# Patient Record
Sex: FEMALE | Race: WHITE | Employment: OTHER | URBAN - METROPOLITAN AREA
[De-identification: names, ages, dates, MRNs, and addresses within clinical notes are randomized per-mention and may not be internally consistent; named-entity substitution may affect disease eponyms.]

---

## 2017-01-26 ENCOUNTER — OFFICE VISIT (OUTPATIENT)
Dept: ENDOCRINOLOGY | Age: 72
End: 2017-01-26

## 2017-01-26 ENCOUNTER — HOSPITAL ENCOUNTER (OUTPATIENT)
Dept: LAB | Age: 72
Discharge: HOME | End: 2017-01-26
Payer: MEDICARE

## 2017-01-26 VITALS
DIASTOLIC BLOOD PRESSURE: 65 MMHG | HEIGHT: 66 IN | BODY MASS INDEX: 39.05 KG/M2 | WEIGHT: 243 LBS | HEART RATE: 82 BPM | TEMPERATURE: 97.4 F | RESPIRATION RATE: 20 BRPM | OXYGEN SATURATION: 97 % | SYSTOLIC BLOOD PRESSURE: 110 MMHG

## 2017-01-26 DIAGNOSIS — L68.0 HIRSUTISM: ICD-10-CM

## 2017-01-26 DIAGNOSIS — L65.9 HAIR LOSS: Primary | ICD-10-CM

## 2017-01-26 DIAGNOSIS — E07.9 THYROID DISORDER: ICD-10-CM

## 2017-01-26 PROCEDURE — 83001 ASSAY OF GONADOTROPIN (FSH): CPT

## 2017-01-26 PROCEDURE — 83002 ASSAY OF GONADOTROPIN (LH): CPT

## 2017-01-26 PROCEDURE — 84403 ASSAY OF TOTAL TESTOSTERONE: CPT

## 2017-01-26 PROCEDURE — 36415 COLL VENOUS BLD VENIPUNCTURE: CPT

## 2017-01-26 PROCEDURE — 84443 ASSAY THYROID STIM HORMONE: CPT

## 2017-01-26 NOTE — PROGRESS NOTES
HISTORY OF PRESENT ILLNESS  Anthony Mix is a 80 y.o. female. HPI  Patient here for  F/u after last  visit of Type 2 diabetes mellitus  And osteoporosis  From Dec  2016       She is losing hair on scalp   She has hot flashes , sweating     She wonders if the metformin er is the cause   She has not changed her diet at all   She experienced constipation instead of diarrhea on metformin er         . Referred : by self    H/o diabetes for 1 year     Current A1C is 7.1 % and symptoms/problems include none     Current diabetic medications include metformin. She never liked this medicaiton    Current monitoring regimen: home blood tests - daily  Home blood sugar records: trend: fluctuating a bit   Any episodes of hypoglycemia? no    Weight trend: stable  Prior visit with dietician: no  Current diet: \"unhealthy\" diet in general  Current exercise: no regular exercise, knee injury     Known diabetic complications: none  Cardiovascular risk factors: dyslipidemia, diabetes mellitus, obesity, hypertension, stress, post-menopausal    Eye exam current (within one year): yes  STANISLAW: no     Past Medical History   Diagnosis Date    HTN (hypertension)     Muscle ache     Muscle pain     Stiff joint      Past Surgical History   Procedure Laterality Date    Hx knee replacement Right 2012    Hx cholecystectomy  1976     Current Outpatient Prescriptions   Medication Sig    GLUCOSAMINE HCL/CHONDR RODRIGUES A NA (OSTEO BI-FLEX PO) Take  by mouth.  loratadine (CLARITIN) 10 mg tablet Take 10 mg by mouth.  b complex vitamins tablet Take 1 Tab by mouth daily.  PSYLLIUM SEED, WITH DEXTROSE, (FIBER PO) Take  by mouth.  glucose blood VI test strips (FREESTYLE LITE STRIPS) strip Test once daily. Dx code E11.65    Lancets (FREESTYLE LANCETS) misc Test once daily. Dx code E11.65    metFORMIN (GLUCOPHAGE) 500 mg tablet Take 1 Tab by mouth two (2) times daily (with meals).  STOP METFORMIN ER    spironolactone (ALDACTONE) 50 mg tablet Take  by mouth daily.  rosuvastatin (CRESTOR) 5 mg tablet Take  by mouth nightly.  candesartan-hydrochlorothiazide (ATACAND HCT) 32-12.5 mg per tablet Take 1 Tab by mouth daily.  aspirin delayed-release 81 mg tablet Take  by mouth daily.  calcium citrate-vitamin D3 (CITRACAL WITH VITAMIN D MAXIMUM) tablet Take  by mouth two (2) times a day.  FOLIC ACID/MULTIVIT-MIN/LUTEIN (CENTRUM SILVER PO) Take  by mouth.  ASCORBATE CALCIUM (COLLINS-C PO) Take  by mouth.  omega-3 fatty acids-vitamin e 1,000 mg cap Take 1 Cap by mouth.  TURMERIC ROOT EXTRACT PO Take  by mouth.  MULTIVITAMIN WITH MINERALS (HAIR,SKIN AND NAILS PO) Take  by mouth.  LACTOBACILLUS ACIDOPHILUS (PROBIOTIC PO) Take  by mouth. No current facility-administered medications for this visit. Review of Systems   Constitutional: Negative. HENT: Negative. Eyes: Negative for pain and redness. Respiratory: Negative. Cardiovascular: Negative for chest pain, palpitations and leg swelling. Gastrointestinal: Negative. Negative for constipation. Genitourinary: Negative. Musculoskeletal: Negative for myalgias. Skin: Negative. Neurological: Negative. Endo/Heme/Allergies: Negative. Psychiatric/Behavioral: Negative for depression and memory loss. The patient does not have insomnia. Physical Exam   Constitutional: She is oriented to person, place, and time. She appears well-developed and well-nourished. HENT:   Head: Normocephalic. Eyes: Conjunctivae and EOM are normal. Pupils are equal, round, and reactive to light. Neck: Normal range of motion. Neck supple. No JVD present. No tracheal deviation present. No thyromegaly present. Cardiovascular: Normal rate, regular rhythm and normal heart sounds. No murmur heard. Pulmonary/Chest: Breath sounds normal.   Abdominal: Soft. Bowel sounds are normal.   Musculoskeletal: Normal range of motion.    Lymphadenopathy:     She has no cervical adenopathy. Neurological: She is alert and oriented to person, place, and time. She has normal reflexes. Skin: Skin is warm. Psychiatric: She has a normal mood and affect. Lab Results   Component Value Date/Time    Hemoglobin A1c 7.4 12/01/2016 08:45 AM    Hemoglobin A1c 7.0 08/31/2016 11:03 AM    Glucose 159 12/01/2016 08:45 AM    Microalb/Creat ratio (ug/mg creat.) 8.3 12/01/2016 08:45 AM    LDL, calculated 66 12/01/2016 08:45 AM    Creatinine 0.57 12/01/2016 08:45 AM      Lab Results   Component Value Date/Time    Cholesterol, total 166 12/01/2016 08:45 AM    HDL Cholesterol 41 12/01/2016 08:45 AM    LDL, calculated 66 12/01/2016 08:45 AM    Triglyceride 297 12/01/2016 08:45 AM       Lab Results   Component Value Date/Time    ALT 22 12/01/2016 08:45 AM    AST 26 12/01/2016 08:45 AM    Alk. phosphatase 53 12/01/2016 08:45 AM    Bilirubin, total 1.1 12/01/2016 08:45 AM       Lab Results   Component Value Date/Time    GFR est  12/01/2016 08:45 AM    GFR est non-AA 94 12/01/2016 08:45 AM    Creatinine 0.57 12/01/2016 08:45 AM    BUN 16 12/01/2016 08:45 AM    Sodium 141 12/01/2016 08:45 AM    Potassium 4.4 12/01/2016 08:45 AM    Chloride 99 12/01/2016 08:45 AM    CO2 28 12/01/2016 08:45 AM            ASSESSMENT and PLAN    1. Hair loss and hirsutism   Explained to her the imbalance in menopause   Ordered labs incl thyrodi checks       2. Menopause : start on effexor xr  At later visits for hotflashes       3. Dizziness - liekly from max effects of BP med   Decrease it to half pill bid dosing         4.  Type 2 DM uncontrolled : a1c is Compared to 7 .4%  From dec 2016  Compared to  7%     From Aug  2016  Compared   To 7.1 %   From march 2016     Discussed patho-physiology of DM 2 again , emphasized on TLC  She generally dislikes being on any kind of  prescibed meds, but ironically she takes several vitamin supplements    ASKED her to be more natural , and reminded her of eating home prepared meals Tolerating the metformin interestingly   Discussed inj incretins but she rejected them     Patient is advised to check blood sugars 1-2 times daily by rotation method. reviewed medications and side effects in detail  lab results and schedule of future lab studies reviewed with patient        2. Hypoglycemia :  Educated on treating the hypoglycemia. 3. HTN : continue aldactone and atacand -hctz. Patient is educated about importance of compliance with anti-hypertensives especially ARB/ACEI    4. Dyslipidemia : continue crestor . Patient is educated about benefits and adverse effects of statins and explained how benefits outweigh risk. 5. use of aspirin to prevent MI and TIA's discussed        6. Low BMD  :  Date : March 2016   Bone DEXA  ap spine T-score 0.3; left femoral Neck T- score  -1.5, right femoral neck T-score-1.3  Date : march 2014   Bone DEXA  ap spine T-score 0.0; left femoral Neck T- score -2.0, right femoral neck T-score -0.5    No prior h/o fractures   Discussed findings, reassured her that there is no suggestion of osteoporosis  She could benefit from Prolia use   Will do weekly fosamax first   She needs to be on calcium and vit d supplementation      7. Obesity : Body mass index is 39.22 kg/(m^2).   She has to improve her diet significantly      F/u in 3 months   > 50 % of time is spent on counseling

## 2017-01-26 NOTE — PROGRESS NOTES
Wt Readings from Last 3 Encounters:   01/26/17 243 lb (110.2 kg)   12/07/16 246 lb (111.6 kg)   09/07/16 244 lb (110.7 kg)     Temp Readings from Last 3 Encounters:   01/26/17 97.4 °F (36.3 °C) (Oral)   12/07/16 97.5 °F (36.4 °C) (Oral)   09/07/16 97.9 °F (36.6 °C) (Oral)     BP Readings from Last 3 Encounters:   01/26/17 110/65   12/07/16 131/60   09/07/16 116/60     Pulse Readings from Last 3 Encounters:   01/26/17 82   12/07/16 70   09/07/16 76     Lab Results   Component Value Date/Time    Hemoglobin A1c 7.4 12/01/2016 08:45 AM     No Podiatry  Last Eye exam June 2016

## 2017-01-26 NOTE — MR AVS SNAPSHOT
Visit Information Date & Time Provider Department Dept. Phone Encounter #  
 1/26/2017 11:45 AM Kip Jain MD [facility] [phone] [id number] Your Appointments 3/7/2017  8:45 AM  
LAB with CDE NURSE [facility] Appt Note: fasting lab  
 [address] [place] [address] [facility] [address]  
[phone]  
  
   
 [address] [facility] [address]  
  
    
 3/14/2017 11:00 AM  
ROUTINE CARE with Kip Jain MD  
[facility] Appt Note: 3 mon fu DM  
 [address] [facility] [address]  
[phone]  
  
   
 [address] [place] [id number] Upcoming Health Maintenance Date Due Hepatitis C Screening 1945 FOOT EXAM Q1 5/20/1956 EYE EXAM RETINAL OR DILATED Q1 5/20/1956 DTaP/Tdap/Td series (1 - Tdap) 5/20/1967 BREAST CANCER SCRN MAMMOGRAM 5/20/1996 FOBT Q 1 YEAR AGE 50-75 5/20/1996 ZOSTER VACCINE AGE 60> 5/20/2006 GLAUCOMA SCREENING Q2Y 5/20/2011 Pneumococcal 65+ Low/Medium Risk (1 of 2 - PCV13) 5/20/2011 MEDICARE YEARLY EXAM 5/20/2011 INFLUENZA AGE 9 TO ADULT 8/1/2016 HEMOGLOBIN A1C Q6M 6/1/2017 MICROALBUMIN Q1 12/1/2017 LIPID PANEL Q1 12/1/2017 Allergies as of 1/26/2017  Review Complete On: 1/26/2017 By: Kip Jain MD  
 No Known Allergies Current Immunizations  Never Reviewed No immunizations on file. Not reviewed this visit Vitals BP Pulse Temp Resp Height(growth percentile) Weight(growth percentile) 110/65 82 97.4 °F (36.3 °C) (Oral) 20 5' 6\" (1.676 m) 243 lb (110.2 kg) SpO2 BMI OB Status Smoking Status 97% 39.22 kg/m2 Postmenopausal Never Smoker Vitals History BMI and BSA Data Body Mass Index Body Surface Area  
 39.22 kg/m 2 2.27 m 2 Preferred Pharmacy Pharmacy Name Phone [facility] [address] [phone] Your Updated Medication List  
  
   
This list is accurate as of: 1/26/17 12:34 PM.  Always use your most recent med list.  
  
  
  
  
 aspirin delayed-release 81 mg tablet Take  by mouth daily. b complex vitamins tablet Take 1 Tab by mouth daily. calcium citrate-vitamin D3 tablet Commonly known as:  CITRACAL WITH VITAMIN D MAXIMUM Take  by mouth two (2) times a day. candesartan-hydroCHLOROthiazide 32-12.5 mg per tablet Commonly known as:  ATACAND HCT Take 1 Tab by mouth daily. CENTRUM SILVER PO Take  by mouth. COLLINS-C PO Take  by mouth. FIBER PO Take  by mouth. glucose blood VI test strips strip Commonly known as:  FREESTYLE LITE STRIPS Test once daily. Dx code E11.65 HAIR,SKIN AND NAILS PO Take  by mouth. Lancets Misc Commonly known as:  FREESTYLE LANCETS Test once daily. Dx code E11.65  
  
 loratadine 10 mg tablet Commonly known as:  Rogelio Ana Take 10 mg by mouth.  
  
 metFORMIN 500 mg tablet Commonly known as:  GLUCOPHAGE Take 1 Tab by mouth two (2) times daily (with meals). STOP METFORMIN ER  
  
 omega-3 fatty acids-vitamin e 1,000 mg Cap Take 1 Cap by mouth. OSTEO BI-FLEX PO Take  by mouth. PROBIOTIC PO Take  by mouth. rosuvastatin 5 mg tablet Commonly known as:  CRESTOR Take  by mouth nightly. spironolactone 50 mg tablet Commonly known as:  ALDACTONE Take  by mouth daily. TURMERIC ROOT EXTRACT PO Take  by mouth. Patient Instructions Cut the spironolactone 50 mg to half pill and take it twice a day Introducing [facility] SERVICES! [employer] introduces [employer] patient portal. Now you can access parts of your medical record, email your doctor's office, and request medication refills online. 1. In your internet browser, go to [email or web address] 2. Click on the First Time User? Click Here link in the Sign In box. You will see the New Member Sign Up page. 3. Enter your [employer] Access Code exactly as it appears below. You will not need to use this code after youve completed the sign-up process. If you do not sign up before the expiration date, you must request a new code. · [employer] Access Code: [email or web address] Expires: 3/7/2017 11:15 AM 
 
4. Enter the last four digits of your Social Security Number (xxxx) and Date of Birth (mm/dd/yyyy) as indicated and click Submit. You will be taken to the next sign-up page. 5. Create a [employer] ID. This will be your [employer] login ID and cannot be changed, so think of one that is secure and easy to remember. 6. Create a [employer] password. You can change your password at any time. 7. Enter your Password Reset Question and Answer. This can be used at a later time if you forget your password. 8. Enter your e-mail address. You will receive e-mail notification when new information is available in [address] 9. Click Sign Up. You can now view and download portions of your medical record. 10. Click the Download Summary menu link to download a portable copy of your medical information. If you have questions, please visit the Frequently Asked Questions section of the [employer] website. Remember, [employer] is NOT to be used for urgent needs. For medical emergencies, dial 911. Now available from your iPhone and Android! Please provide this summary of care documentation to your next provider. Your primary care clinician is listed as Margaret Caruso. If you have any questions after today's visit, please call [phone].

## 2017-02-01 LAB
FSH SERPL-ACNC: 25.4 MIU/ML
LH SERPL-ACNC: 12.3 MIU/ML
TESTOST FREE SERPL-MCNC: 1.7 PG/ML (ref 0–4.2)
TESTOST SERPL-MCNC: 8 NG/DL (ref 7–40)
TSH SERPL DL<=0.005 MIU/L-ACNC: 2.09 UIU/ML (ref 0.45–4.5)

## 2017-03-07 ENCOUNTER — HOSPITAL ENCOUNTER (OUTPATIENT)
Dept: LAB | Age: 72
Discharge: HOME | End: 2017-03-07
Payer: MEDICARE

## 2017-03-07 ENCOUNTER — LAB ONLY (OUTPATIENT)
Dept: ENDOCRINOLOGY | Age: 72
End: 2017-03-07

## 2017-03-07 DIAGNOSIS — N39.0 URINARY TRACT INFECTION, SITE UNSPECIFIED: ICD-10-CM

## 2017-03-07 DIAGNOSIS — E11.65 TYPE 2 DIABETES MELLITUS WITH HYPERGLYCEMIA, WITHOUT LONG-TERM CURRENT USE OF INSULIN (HCC): Primary | ICD-10-CM

## 2017-03-07 PROCEDURE — 36415 COLL VENOUS BLD VENIPUNCTURE: CPT

## 2017-03-07 PROCEDURE — 80053 COMPREHEN METABOLIC PANEL: CPT

## 2017-03-07 PROCEDURE — 83036 HEMOGLOBIN GLYCOSYLATED A1C: CPT

## 2017-03-07 PROCEDURE — 82043 UR ALBUMIN QUANTITATIVE: CPT

## 2017-03-07 PROCEDURE — 80061 LIPID PANEL: CPT

## 2017-03-07 PROCEDURE — 81001 URINALYSIS AUTO W/SCOPE: CPT

## 2017-03-08 LAB
ALBUMIN SERPL-MCNC: 4.6 G/DL (ref 3.5–4.8)
ALBUMIN/CREAT UR: 18.3 MG/G CREAT (ref 0–30)
ALBUMIN/GLOB SERPL: 2.2 {RATIO} (ref 1.1–2.5)
ALP SERPL-CCNC: 58 IU/L (ref 39–117)
ALT SERPL-CCNC: 17 IU/L (ref 0–32)
APPEARANCE UR: ABNORMAL
AST SERPL-CCNC: 17 IU/L (ref 0–40)
BACTERIA #/AREA URNS HPF: ABNORMAL /[HPF]
BILIRUB SERPL-MCNC: 1.1 MG/DL (ref 0–1.2)
BILIRUB UR QL STRIP: NEGATIVE
BUN SERPL-MCNC: 16 MG/DL (ref 8–27)
BUN/CREAT SERPL: 25 (ref 11–26)
CALCIUM SERPL-MCNC: 9.9 MG/DL (ref 8.7–10.3)
CASTS URNS MICRO: ABNORMAL
CASTS URNS QL MICRO: PRESENT /LPF
CHLORIDE SERPL-SCNC: 97 MMOL/L (ref 96–106)
CHOLEST SERPL-MCNC: 162 MG/DL (ref 100–199)
CO2 SERPL-SCNC: 25 MMOL/L (ref 18–29)
COLOR UR: YELLOW
CREAT SERPL-MCNC: 0.65 MG/DL (ref 0.57–1)
CREAT UR-MCNC: 288.6 MG/DL
CRYSTALS URNS MICRO: ABNORMAL
EPI CELLS #/AREA URNS HPF: ABNORMAL /HPF
EST. AVERAGE GLUCOSE BLD GHB EST-MCNC: 171 MG/DL
GLOBULIN SER CALC-MCNC: 2.1 G/DL (ref 1.5–4.5)
GLUCOSE SERPL-MCNC: 165 MG/DL (ref 65–99)
GLUCOSE UR QL: NEGATIVE
HBA1C MFR BLD: 7.6 % (ref 4.8–5.6)
HDLC SERPL-MCNC: 40 MG/DL
HGB UR QL STRIP: ABNORMAL
INTERPRETATION, 910389: NORMAL
KETONES UR QL STRIP: ABNORMAL
LDLC SERPL CALC-MCNC: 68 MG/DL (ref 0–99)
LEUKOCYTE ESTERASE UR QL STRIP: ABNORMAL
Lab: NORMAL
MICRO URNS: ABNORMAL
MICROALBUMIN UR-MCNC: 52.8 UG/ML
MUCOUS THREADS URNS QL MICRO: PRESENT
NITRITE UR QL STRIP: POSITIVE
PH UR STRIP: 5.5 [PH] (ref 5–7.5)
POTASSIUM SERPL-SCNC: 4.7 MMOL/L (ref 3.5–5.2)
PROT SERPL-MCNC: 6.7 G/DL (ref 6–8.5)
PROT UR QL STRIP: ABNORMAL
RBC #/AREA URNS HPF: ABNORMAL /HPF
SODIUM SERPL-SCNC: 138 MMOL/L (ref 134–144)
SP GR UR: 1.03 (ref 1–1.03)
TRIGL SERPL-MCNC: 268 MG/DL (ref 0–149)
UNIDENT CRYS URNS QL MICRO: PRESENT
UROBILINOGEN UR STRIP-MCNC: 0.2 MG/DL (ref 0.2–1)
VLDLC SERPL CALC-MCNC: 54 MG/DL (ref 5–40)
WBC #/AREA URNS HPF: >30 /HPF

## 2017-03-14 ENCOUNTER — HOSPITAL ENCOUNTER (OUTPATIENT)
Dept: LAB | Age: 72
Discharge: HOME | End: 2017-03-14
Payer: MEDICARE

## 2017-03-14 ENCOUNTER — OFFICE VISIT (OUTPATIENT)
Dept: ENDOCRINOLOGY | Age: 72
End: 2017-03-14

## 2017-03-14 VITALS
HEIGHT: 66 IN | TEMPERATURE: 97.9 F | HEART RATE: 78 BPM | BODY MASS INDEX: 39.05 KG/M2 | WEIGHT: 243 LBS | SYSTOLIC BLOOD PRESSURE: 147 MMHG | DIASTOLIC BLOOD PRESSURE: 70 MMHG | RESPIRATION RATE: 16 BRPM

## 2017-03-14 DIAGNOSIS — N30.00 ACUTE CYSTITIS WITHOUT HEMATURIA: ICD-10-CM

## 2017-03-14 DIAGNOSIS — E78.2 MIXED HYPERLIPIDEMIA: ICD-10-CM

## 2017-03-14 DIAGNOSIS — E11.65 TYPE 2 DIABETES MELLITUS WITH HYPERGLYCEMIA, WITHOUT LONG-TERM CURRENT USE OF INSULIN (HCC): Primary | ICD-10-CM

## 2017-03-14 DIAGNOSIS — I10 ESSENTIAL HYPERTENSION: ICD-10-CM

## 2017-03-14 PROCEDURE — 81001 URINALYSIS AUTO W/SCOPE: CPT

## 2017-03-14 PROCEDURE — 87088 URINE BACTERIA CULTURE: CPT

## 2017-03-14 PROCEDURE — 87077 CULTURE AEROBIC IDENTIFY: CPT

## 2017-03-14 PROCEDURE — 87086 URINE CULTURE/COLONY COUNT: CPT

## 2017-03-14 PROCEDURE — 87186 SC STD MICRODIL/AGAR DIL: CPT

## 2017-03-14 RX ORDER — GLIPIZIDE 5 MG/1
TABLET ORAL
Qty: 15 TAB | Refills: 6 | Status: SHIPPED | OUTPATIENT
Start: 2017-03-14 | End: 2018-03-07 | Stop reason: ALTCHOICE

## 2017-03-14 RX ORDER — METFORMIN HYDROCHLORIDE 500 MG/1
1000 TABLET, EXTENDED RELEASE ORAL 2 TIMES DAILY WITH MEALS
Qty: 120 TAB | Refills: 6 | Status: SHIPPED | OUTPATIENT
Start: 2017-03-14 | End: 2018-03-07 | Stop reason: SDUPTHER

## 2017-03-14 NOTE — PROGRESS NOTES
HISTORY OF PRESENT ILLNESS  Alberto Hunt is a 80 y.o. female. HPI  Patient here for  F/u after last  visit of Type 2 diabetes mellitus  And osteoporosis  From jan 2017       She is losing hair on scalp   She has hot flashes , sweating     She wonders if the metformin er is the cause   She has not changed her diet at all   She experienced constipation instead of diarrhea on metformin er         . Referred : by self    H/o diabetes for 1 year     Current A1C is 7.1 % and symptoms/problems include none     Current diabetic medications include metformin. She never liked this medicaiton    Current monitoring regimen: home blood tests - daily  Home blood sugar records: trend: fluctuating a bit   Any episodes of hypoglycemia? no    Weight trend: stable  Prior visit with dietician: no  Current diet: \"unhealthy\" diet in general  Current exercise: no regular exercise, knee injury     Known diabetic complications: none  Cardiovascular risk factors: dyslipidemia, diabetes mellitus, obesity, hypertension, stress, post-menopausal    Eye exam current (within one year): yes  STANISLAW: no     Past Medical History:   Diagnosis Date    HTN (hypertension)     Muscle ache     Muscle pain     Stiff joint      Past Surgical History:   Procedure Laterality Date    HX CHOLECYSTECTOMY  1976    HX KNEE REPLACEMENT Right 2012     Current Outpatient Prescriptions   Medication Sig    GLUCOSAMINE HCL/CHONDR RODRIGUES A NA (OSTEO BI-FLEX PO) Take  by mouth.  loratadine (CLARITIN) 10 mg tablet Take 10 mg by mouth.  b complex vitamins tablet Take 1 Tab by mouth daily.  metFORMIN (GLUCOPHAGE) 500 mg tablet Take 1 Tab by mouth two (2) times daily (with meals). STOP METFORMIN ER    spironolactone (ALDACTONE) 50 mg tablet Take  by mouth daily.  rosuvastatin (CRESTOR) 5 mg tablet Take  by mouth nightly.  candesartan-hydrochlorothiazide (ATACAND HCT) 32-12.5 mg per tablet Take 1 Tab by mouth daily.     aspirin delayed-release 81 mg tablet Take  by mouth daily.  calcium citrate-vitamin D3 (CITRACAL WITH VITAMIN D MAXIMUM) tablet Take  by mouth two (2) times a day.  ASCORBATE CALCIUM (COLLINS-C PO) Take  by mouth.  omega-3 fatty acids-vitamin e 1,000 mg cap Take 1 Cap by mouth.  TURMERIC ROOT EXTRACT PO Take  by mouth.  MULTIVITAMIN WITH MINERALS (HAIR,SKIN AND NAILS PO) Take  by mouth.  PSYLLIUM SEED, WITH DEXTROSE, (FIBER PO) Take  by mouth.  LACTOBACILLUS ACIDOPHILUS (PROBIOTIC PO) Take  by mouth.  glucose blood VI test strips (FREESTYLE LITE STRIPS) strip Test once daily. Dx code E11.65    Lancets (FREESTYLE LANCETS) misc Test once daily. Dx code E11.65     No current facility-administered medications for this visit. Review of Systems   Constitutional: Negative. HENT: Negative. Eyes: Negative for pain and redness. Respiratory: Negative. Cardiovascular: Negative for chest pain, palpitations and leg swelling. Gastrointestinal: Negative. Negative for constipation. Genitourinary: Negative. Musculoskeletal: Negative for myalgias. Skin: Negative. Neurological: Negative. Endo/Heme/Allergies: Negative. Psychiatric/Behavioral: Negative for depression and memory loss. The patient does not have insomnia. Physical Exam   Constitutional: She is oriented to person, place, and time. She appears well-developed and well-nourished. HENT:   Head: Normocephalic. Eyes: Conjunctivae and EOM are normal. Pupils are equal, round, and reactive to light. Neck: Normal range of motion. Neck supple. No JVD present. No tracheal deviation present. No thyromegaly present. Cardiovascular: Normal rate, regular rhythm and normal heart sounds. No murmur heard. Pulmonary/Chest: Breath sounds normal.   Abdominal: Soft. Bowel sounds are normal.   Musculoskeletal: Normal range of motion. Lymphadenopathy:     She has no cervical adenopathy.    Neurological: She is alert and oriented to person, place, and time. She has normal reflexes. Skin: Skin is warm. Psychiatric: She has a normal mood and affect. Lab Results   Component Value Date/Time    Hemoglobin A1c 7.6 03/07/2017 09:43 AM    Hemoglobin A1c 7.4 12/01/2016 08:45 AM    Hemoglobin A1c 7.0 08/31/2016 11:03 AM    Glucose 165 03/07/2017 09:43 AM    Microalb/Creat ratio (ug/mg creat.) 18.3 03/07/2017 09:43 AM    LDL, calculated 68 03/07/2017 09:43 AM    Creatinine 0.65 03/07/2017 09:43 AM      Lab Results   Component Value Date/Time    Cholesterol, total 162 03/07/2017 09:43 AM    HDL Cholesterol 40 03/07/2017 09:43 AM    LDL, calculated 68 03/07/2017 09:43 AM    Triglyceride 268 03/07/2017 09:43 AM       Lab Results   Component Value Date/Time    ALT (SGPT) 17 03/07/2017 09:43 AM    AST (SGOT) 17 03/07/2017 09:43 AM    Alk. phosphatase 58 03/07/2017 09:43 AM    Bilirubin, total 1.1 03/07/2017 09:43 AM       Lab Results   Component Value Date/Time    GFR est  03/07/2017 09:43 AM    GFR est non-AA 90 03/07/2017 09:43 AM    Creatinine 0.65 03/07/2017 09:43 AM    BUN 16 03/07/2017 09:43 AM    Sodium 138 03/07/2017 09:43 AM    Potassium 4.7 03/07/2017 09:43 AM    Chloride 97 03/07/2017 09:43 AM    CO2 25 03/07/2017 09:43 AM            ASSESSMENT and PLAN    1. UTI :  To do urine analysis  and cult         2.  Type 2 DM uncontrolled : a1c is 7.6 %    From  March 2017    Compared to 7 .4%  From dec 2016  Compared to  7%     From Aug  2016  Compared   To 7.1 %   From march 2016     Discussed patho-physiology of DM 2 again , emphasized on TLC  She generally dislikes being on any kind of  prescibed meds, but ironically she takes several vitamin supplements    ASKED her to be more natural , and reminded her of eating home prepared meals     Tolerating the metformin interestingly   Starting  On glipizide at small doses   TLC emphasized   Discussed inj incretins but she rejected them     Patient is advised to check blood sugars 1-2 times daily by rotation method. reviewed medications and side effects in detail  lab results and schedule of future lab studies reviewed with patient        2. Hypoglycemia :  Educated on treating the hypoglycemia. 3. HTN : continue aldactone and atacand -hctz. Patient is educated about importance of compliance with anti-hypertensives especially ARB/ACEI    4. Dyslipidemia : continue crestor . Patient is educated about benefits and adverse effects of statins and explained how benefits outweigh risk. 5. use of aspirin to prevent MI and TIA's discussed        6. Low BMD  :  Date : March 2016   Bone DEXA  ap spine T-score 0.3; left femoral Neck T- score  -1.5, right femoral neck T-score-1.3  Date : march 2014   Bone DEXA  ap spine T-score 0.0; left femoral Neck T- score -2.0, right femoral neck T-score -0.5    No prior h/o fractures   Discussed findings, reassured her that there is no suggestion of osteoporosis  She could benefit from Prolia use   Will do weekly fosamax first   She needs to be on calcium and vit d supplementation      7. Obesity : Body mass index is 39.22 kg/(m^2).   She has to improve her diet significantly      F/u in 3 months     > 50 % of time is spent on counseling

## 2017-03-14 NOTE — MR AVS SNAPSHOT
Visit Information Date & Time Provider Department Dept. Phone Encounter #  
 3/14/2017 11:00 AM Janice Rich MD [facility] [phone] [id number] Follow-up Instructions Return in about 3 months (around 6/14/2017). Upcoming Health Maintenance Date Due Hepatitis C Screening 1945 FOOT EXAM Q1 5/20/1956 EYE EXAM RETINAL OR DILATED Q1 5/20/1956 DTaP/Tdap/Td series (1 - Tdap) 5/20/1967 BREAST CANCER SCRN MAMMOGRAM 5/20/1996 FOBT Q 1 YEAR AGE 50-75 5/20/1996 ZOSTER VACCINE AGE 60> 5/20/2006 GLAUCOMA SCREENING Q2Y 5/20/2011 Pneumococcal 65+ Low/Medium Risk (1 of 2 - PCV13) 5/20/2011 MEDICARE YEARLY EXAM 5/20/2011 INFLUENZA AGE 9 TO ADULT 8/1/2016 HEMOGLOBIN A1C Q6M 9/7/2017 MICROALBUMIN Q1 3/7/2018 LIPID PANEL Q1 3/7/2018 Allergies as of 3/14/2017  Review Complete On: 3/14/2017 By: Janice Rich MD  
 Not on File Current Immunizations  Never Reviewed No immunizations on file. Not reviewed this visit You Were Diagnosed With   
  
 Codes Comments Type 2 diabetes mellitus with hyperglycemia, without long-term current use of insulin (HCC)    -  Primary ICD-10-CM: E11.65 ICD-9-CM: 250.00, 790.29 Essential hypertension     ICD-10-CM: I10 
ICD-9-CM: 401.9 Mixed hyperlipidemia     ICD-10-CM: E78.2 ICD-9-CM: 272.2 Acute cystitis without hematuria     ICD-10-CM: N30.00 ICD-9-CM: 595.0 Vitals BP Pulse Temp Resp Height(growth percentile) Weight(growth percentile) 147/70 (BP 1 Location: Left arm, BP Patient Position: Sitting) 78 97.9 °F (36.6 °C) (Oral) 16 5' 6\" (1.676 m) 243 lb (110.2 kg) BMI OB Status Smoking Status 39.22 kg/m2 Postmenopausal Never Smoker BMI and BSA Data Body Mass Index Body Surface Area  
 39.22 kg/m 2 2.27 m 2 Preferred Pharmacy Pharmacy Name Phone [facility] [address] [phone] Your Updated Medication List  
  
   
This list is accurate as of: 3/14/17 11:50 AM.  Always use your most recent med list.  
  
  
  
  
 aspirin delayed-release 81 mg tablet Take  by mouth daily. b complex vitamins tablet Take 1 Tab by mouth daily. calcium citrate-vitamin D3 tablet Commonly known as:  CITRACAL WITH VITAMIN D MAXIMUM Take  by mouth two (2) times a day. candesartan-hydroCHLOROthiazide 32-12.5 mg per tablet Commonly known as:  ATACAND HCT Take 1 Tab by mouth daily. COLLINS-C PO Take  by mouth. FIBER PO Take  by mouth. glucose blood VI test strips strip Commonly known as:  FREESTYLE LITE STRIPS Test once daily. Dx code E11.65 HAIR,SKIN AND NAILS PO Take  by mouth. Lancets Misc Commonly known as:  FREESTYLE LANCETS Test once daily. Dx code E11.65  
  
 loratadine 10 mg tablet Commonly known as:  Otilia Russ Take 10 mg by mouth.  
  
 metFORMIN 500 mg tablet Commonly known as:  GLUCOPHAGE Take 1 Tab by mouth two (2) times daily (with meals). STOP METFORMIN ER  
  
 omega-3 fatty acids-vitamin e 1,000 mg Cap Take 1 Cap by mouth. OSTEO BI-FLEX PO Take  by mouth. PROBIOTIC PO Take  by mouth. rosuvastatin 5 mg tablet Commonly known as:  CRESTOR Take  by mouth nightly. spironolactone 50 mg tablet Commonly known as:  ALDACTONE Take  by mouth daily. TURMERIC ROOT EXTRACT PO Take  by mouth. Follow-up Instructions Return in about 3 months (around 6/14/2017). Patient Instructions Increase metformin ER to 1000 mg  Twice a day with meals Start on glipizide 2.5 mg ( half pill of 5 mg )  Twenty minutes before b-fast and dinner  
 
 
 
-------------------------------------------------------------------------------------------------------------------------- Cut the spironolactone 50 mg to half pill and take it twice a day ------------------------------------------------------------------------------------------------------------------ Do not skip meals Do not eat in between meals Reduce carbs- pasta, rice, potatoes, bread Do not drink juices or sodas Donot eat peanut butter Do not eat sugar free cookies and cakes Do not eat peaches, grapes, oranges, pineapples  
 
------------------------------------------------------------------------------------------------------------------ Introducing [facility]! Derek Miller introduces [employer] patient portal. Now you can access parts of your medical record, email your doctor's office, and request medication refills online. 1. In your internet browser, go to [email or web address] 2. Click on the First Time User? Click Here link in the Sign In box. You will see the New Member Sign Up page. 3. Enter your [employer] Access Code exactly as it appears below. You will not need to use this code after youve completed the sign-up process. If you do not sign up before the expiration date, you must request a new code. · [employer] Access Code: [email or web address] Expires: 6/12/2017 11:48 AM 
 
4. Enter the last four digits of your Social Security Number (xxxx) and Date of Birth (mm/dd/yyyy) as indicated and click Submit. You will be taken to the next sign-up page. 5. Create a [employer] ID. This will be your [employer] login ID and cannot be changed, so think of one that is secure and easy to remember. 6. Create a [employer] password. You can change your password at any time. 7. Enter your Password Reset Question and Answer. This can be used at a later time if you forget your password. 8. Enter your e-mail address. You will receive e-mail notification when new information is available in [address] 9. Click Sign Up. You can now view and download portions of your medical record.  
10. Click the Download Summary menu link to download a portable copy of your medical information. If you have questions, please visit the Frequently Asked Questions section of the [employer] website. Remember, [employer] is NOT to be used for urgent needs. For medical emergencies, dial 911. Now available from your iPhone and Android! Please provide this summary of care documentation to your next provider. Your primary care clinician is listed as Alesia Pallas. If you have any questions after today's visit, please call [phone].

## 2017-03-14 NOTE — PATIENT INSTRUCTIONS
Increase metformin ER to 1000 mg  Twice a day with meals     Start on glipizide 2.5 mg ( half pill of 5 mg )  Twenty minutes before b-fast and dinner         --------------------------------------------------------------------------------------------------------------------------    Cut the spironolactone 50 mg to half pill and take it twice a day     ------------------------------------------------------------------------------------------------------------------    Do not skip meals  Do not eat in between meals    Reduce carbs- pasta, rice, potatoes, bread   Do not drink juices or sodas  Donot eat peanut butter     Do not eat sugar free cookies and cakes   Do not eat peaches, grapes, oranges, pineapples     ------------------------------------------------------------------------------------------------------------------

## 2017-03-14 NOTE — PROGRESS NOTES
Wt Readings from Last 3 Encounters:   03/14/17 243 lb (110.2 kg)   01/26/17 243 lb (110.2 kg)   12/07/16 246 lb (111.6 kg)     Temp Readings from Last 3 Encounters:   03/14/17 97.9 °F (36.6 °C) (Oral)   01/26/17 97.4 °F (36.3 °C) (Oral)   12/07/16 97.5 °F (36.4 °C) (Oral)     BP Readings from Last 3 Encounters:   03/14/17 147/70   01/26/17 110/65   12/07/16 131/60     Pulse Readings from Last 3 Encounters:   03/14/17 78   01/26/17 82   12/07/16 70     Lab Results   Component Value Date/Time    Hemoglobin A1c 7.6 03/07/2017 09:43 AM

## 2017-03-17 LAB
APPEARANCE UR: CLEAR
BACTERIA #/AREA URNS HPF: NORMAL /[HPF]
BACTERIA UR CULT: ABNORMAL
BILIRUB UR QL STRIP: NEGATIVE
CASTS URNS QL MICRO: NORMAL /LPF
COLOR UR: YELLOW
EPI CELLS #/AREA URNS HPF: NORMAL /HPF
GLUCOSE UR QL: NEGATIVE
HGB UR QL STRIP: NEGATIVE
KETONES UR QL STRIP: NEGATIVE
LEUKOCYTE ESTERASE UR QL STRIP: ABNORMAL
MICRO URNS: ABNORMAL
NITRITE UR QL STRIP: NEGATIVE
PH UR STRIP: 6 [PH] (ref 5–7.5)
PROT UR QL STRIP: NEGATIVE
RBC #/AREA URNS HPF: NORMAL /HPF
SP GR UR: 1.02 (ref 1–1.03)
URINALYSIS REFLEX, 377202: ABNORMAL
UROBILINOGEN UR STRIP-MCNC: 0.2 MG/DL (ref 0.2–1)
WBC #/AREA URNS HPF: NORMAL /HPF

## 2017-03-21 NOTE — PROGRESS NOTES
Her urine sample is so clean this time   The bacteria which grew - are susceptible to any antibiotic gladly   If she has no symptoms , she should nto take any meds now

## 2017-06-07 ENCOUNTER — HOSPITAL ENCOUNTER (OUTPATIENT)
Dept: LAB | Age: 72
Discharge: HOME | End: 2017-06-07
Payer: MEDICARE

## 2017-06-07 PROCEDURE — 83036 HEMOGLOBIN GLYCOSYLATED A1C: CPT

## 2017-06-07 PROCEDURE — 80053 COMPREHEN METABOLIC PANEL: CPT

## 2017-06-07 PROCEDURE — 36415 COLL VENOUS BLD VENIPUNCTURE: CPT

## 2017-06-07 PROCEDURE — 82043 UR ALBUMIN QUANTITATIVE: CPT

## 2017-06-07 PROCEDURE — 80061 LIPID PANEL: CPT

## 2017-06-14 ENCOUNTER — OFFICE VISIT (OUTPATIENT)
Dept: ENDOCRINOLOGY | Age: 72
End: 2017-06-14

## 2017-06-14 VITALS
OXYGEN SATURATION: 98 % | DIASTOLIC BLOOD PRESSURE: 51 MMHG | HEIGHT: 66 IN | TEMPERATURE: 98.1 F | SYSTOLIC BLOOD PRESSURE: 124 MMHG | WEIGHT: 234 LBS | BODY MASS INDEX: 37.61 KG/M2 | HEART RATE: 76 BPM | RESPIRATION RATE: 20 BRPM

## 2017-06-14 DIAGNOSIS — E78.2 MIXED HYPERLIPIDEMIA: ICD-10-CM

## 2017-06-14 DIAGNOSIS — E11.65 TYPE 2 DIABETES MELLITUS WITH HYPERGLYCEMIA, WITHOUT LONG-TERM CURRENT USE OF INSULIN (HCC): Primary | ICD-10-CM

## 2017-06-14 DIAGNOSIS — I10 ESSENTIAL HYPERTENSION: ICD-10-CM

## 2017-06-14 DIAGNOSIS — E66.09 NON MORBID OBESITY DUE TO EXCESS CALORIES: ICD-10-CM

## 2017-06-14 RX ORDER — SPIRONOLACTONE 25 MG/1
25 TABLET ORAL 2 TIMES DAILY
Qty: 60 TAB | Refills: 6 | Status: SHIPPED | OUTPATIENT
Start: 2017-06-14 | End: 2018-03-07 | Stop reason: SDUPTHER

## 2017-06-14 NOTE — PATIENT INSTRUCTIONS
metformin ER to 1000 mg  Twice a day with meals      glipizide 2.5 mg ( half pill of 5 mg )  Twenty minutes before b-fast and dinner         --------------------------------------------------------------------------------------------------------------------------    Cut the spironolactone 50 mg to half pill and take it twice a day     ------------------------------------------------------------------------------------------------------------------    Do not skip meals  Do not eat in between meals    Reduce carbs- pasta, rice, potatoes, bread   Do not drink juices or sodas  Donot eat peanut butter     Do not eat sugar free cookies and cakes   Do not eat peaches, grapes, oranges, pineapples     ------------------------------------------------------------------------------------------------------------------

## 2017-06-14 NOTE — PROGRESS NOTES
Wt Readings from Last 3 Encounters:   06/14/17 234 lb (106.1 kg)   03/14/17 243 lb (110.2 kg)   01/26/17 243 lb (110.2 kg)     Temp Readings from Last 3 Encounters:   06/14/17 98.1 °F (36.7 °C) (Oral)   03/14/17 97.9 °F (36.6 °C) (Oral)   01/26/17 97.4 °F (36.3 °C) (Oral)     BP Readings from Last 3 Encounters:   06/14/17 124/51   03/14/17 147/70   01/26/17 110/65     Pulse Readings from Last 3 Encounters:   06/14/17 76   03/14/17 78   01/26/17 82     Lab Results   Component Value Date/Time    Hemoglobin A1c 6.7 06/07/2017 09:18 AM     No Podiatry  Last Eye exam June 2016

## 2017-06-14 NOTE — MR AVS SNAPSHOT
Visit Information Date & Time Provider Department Dept. Phone Encounter #  
 6/14/2017 10:45 AM Ramon Jo MD [facility] [phone] [id number] Follow-up Instructions Return in about 6 months (around 12/14/2017). Upcoming Health Maintenance Date Due Hepatitis C Screening 1945 FOOT EXAM Q1 5/20/1956 EYE EXAM RETINAL OR DILATED Q1 5/20/1956 DTaP/Tdap/Td series (1 - Tdap) 5/20/1967 BREAST CANCER SCRN MAMMOGRAM 5/20/1996 FOBT Q 1 YEAR AGE 50-75 5/20/1996 ZOSTER VACCINE AGE 60> 5/20/2006 GLAUCOMA SCREENING Q2Y 5/20/2011 Pneumococcal 65+ Low/Medium Risk (1 of 2 - PCV13) 5/20/2011 MEDICARE YEARLY EXAM 5/20/2011 INFLUENZA AGE 9 TO ADULT 8/1/2017 HEMOGLOBIN A1C Q6M 12/7/2017 MICROALBUMIN Q1 6/7/2018 LIPID PANEL Q1 6/7/2018 Allergies as of 6/14/2017  Review Complete On: 6/14/2017 By: Ramon Jo MD  
 No Known Allergies Current Immunizations  Never Reviewed No immunizations on file. Not reviewed this visit You Were Diagnosed With   
  
 Codes Comments Type 2 diabetes mellitus with hyperglycemia, without long-term current use of insulin (HCC)    -  Primary ICD-10-CM: E11.65 ICD-9-CM: 250.00, 790.29 Essential hypertension     ICD-10-CM: I10 
ICD-9-CM: 401.9 Mixed hyperlipidemia     ICD-10-CM: E78.2 ICD-9-CM: 272.2 Non morbid obesity due to excess calories     ICD-10-CM: E66.09 
ICD-9-CM: 278.00 BMI 37.0-37.9, adult     ICD-10-CM: Z68.37 ICD-9-CM: V85.37 Vitals BP Pulse Temp Resp Height(growth percentile) Weight(growth percentile) 124/51 76 98.1 °F (36.7 °C) (Oral) 20 5' 6\" (1.676 m) 234 lb (106.1 kg) SpO2 BMI OB Status Smoking Status 98% 37.77 kg/m2 Postmenopausal Never Smoker BMI and BSA Data Body Mass Index Body Surface Area  
 37.77 kg/m 2 2.22 m 2 Preferred Pharmacy Pharmacy Name Phone [facility] [address] [phone] Your Updated Medication List  
  
   
This list is accurate as of: 6/14/17 11:41 AM.  Always use your most recent med list.  
  
  
  
  
 aspirin delayed-release 81 mg tablet Take  by mouth daily. b complex vitamins tablet Take 1 Tab by mouth daily. calcium citrate-vitamin D3 tablet Commonly known as:  CITRACAL WITH VITAMIN D MAXIMUM Take  by mouth two (2) times a day. candesartan-hydroCHLOROthiazide 32-12.5 mg per tablet Commonly known as:  ATACAND HCT Take 1 Tab by mouth daily. COLLINS-C PO Take  by mouth. FIBER PO Take  by mouth. glipiZIDE 5 mg tablet Commonly known as:  Allmarie Lover Take 1/2 tab 20 minutes before Breakfast and 1/2 tab 20 minutes before Dinner  
  
 glucose blood VI test strips strip Commonly known as:  FREESTYLE LITE STRIPS Test once daily. Dx code E11.65 HAIR,SKIN AND NAILS PO Take  by mouth. Lancets Misc Commonly known as:  FREESTYLE LANCETS Test once daily. Dx code E11.65  
  
 loratadine 10 mg tablet Commonly known as:  Nuno Hummer Take 10 mg by mouth.  
  
 metFORMIN  mg tablet Commonly known as:  GLUCOPHAGE XR Take 2 Tabs by mouth two (2) times daily (with meals). omega-3 fatty acids-vitamin e 1,000 mg Cap Take 1 Cap by mouth. OSTEO BI-FLEX PO Take  by mouth. PROBIOTIC PO Take  by mouth. rosuvastatin 5 mg tablet Commonly known as:  CRESTOR Take  by mouth nightly. spironolactone 50 mg tablet Commonly known as:  ALDACTONE Take  by mouth daily. TURMERIC ROOT EXTRACT PO Take  by mouth. Follow-up Instructions Return in about 6 months (around 12/14/2017). Patient Instructions   
 
 
 
 metformin ER to 1000 mg  Twice a day with meals  
 
 glipizide 2.5 mg ( half pill of 5 mg )  Twenty minutes before b-fast and dinner -------------------------------------------------------------------------------------------------------------------------- Cut the spironolactone 50 mg to half pill and take it twice a day  
 
------------------------------------------------------------------------------------------------------------------ Do not skip meals Do not eat in between meals Reduce carbs- pasta, rice, potatoes, bread Do not drink juices or sodas Donot eat peanut butter Do not eat sugar free cookies and cakes Do not eat peaches, grapes, oranges, pineapples  
 
------------------------------------------------------------------------------------------------------------------ Introducing [facility]! Brisa Rincon introduces [employer] patient portal. Now you can access parts of your medical record, email your doctor's office, and request medication refills online. 1. In your internet browser, go to [email or web address] 2. Click on the First Time User? Click Here link in the Sign In box. You will see the New Member Sign Up page. 3. Enter your [employer] Access Code exactly as it appears below. You will not need to use this code after youve completed the sign-up process. If you do not sign up before the expiration date, you must request a new code. · [employer] Access Code: [email or web address] Expires: 9/12/2017 11:41 AM 
 
4. Enter the last four digits of your Social Security Number (xxxx) and Date of Birth (mm/dd/yyyy) as indicated and click Submit. You will be taken to the next sign-up page. 5. Create a Cryptopayt ID. This will be your [employer] login ID and cannot be changed, so think of one that is secure and easy to remember. 6. Create a Cryptopayt password. You can change your password at any time. 7. Enter your Password Reset Question and Answer. This can be used at a later time if you forget your password. 8. Enter your e-mail address.  You will receive e-mail notification when new information is available in [employer]. 9. Click Sign Up. You can now view and download portions of your medical record. 10. Click the Download Summary menu link to download a portable copy of your medical information. If you have questions, please visit the Frequently Asked Questions section of the [employer] website. Remember, [employer] is NOT to be used for urgent needs. For medical emergencies, dial 911. Now available from your iPhone and Android! Please provide this summary of care documentation to your next provider. Your primary care clinician is listed as Chanda Barrett. If you have any questions after today's visit, please call [phone].

## 2017-06-14 NOTE — PROGRESS NOTES
HISTORY OF PRESENT ILLNESS  Pramod Burgos is a 71 y.o. female. HPI  Patient here for  F/u after last  visit of Type 2 diabetes mellitus  And osteoporosis  From March 14 2017       She lost 11 lbs             . Referred : by self    H/o diabetes for 1 year     Current A1C is 7.1 % and symptoms/problems include none     Current diabetic medications include metformin. She never liked this medicaiton    Current monitoring regimen: home blood tests - daily  Home blood sugar records: trend: fluctuating a bit   Any episodes of hypoglycemia? no    Weight trend: stable  Prior visit with dietician: no  Current diet: \"unhealthy\" diet in general  Current exercise: no regular exercise, knee injury     Known diabetic complications: none  Cardiovascular risk factors: dyslipidemia, diabetes mellitus, obesity, hypertension, stress, post-menopausal    Eye exam current (within one year): yes  STANISLAW: no     Past Medical History:   Diagnosis Date    HTN (hypertension)     Muscle ache     Muscle pain     Stiff joint      Past Surgical History:   Procedure Laterality Date    HX CHOLECYSTECTOMY  1976    HX KNEE REPLACEMENT Right 2012     Current Outpatient Prescriptions   Medication Sig    metFORMIN ER (GLUCOPHAGE XR) 500 mg tablet Take 2 Tabs by mouth two (2) times daily (with meals).  GLUCOSAMINE HCL/CHONDR RODRIGUES A NA (OSTEO BI-FLEX PO) Take  by mouth.  loratadine (CLARITIN) 10 mg tablet Take 10 mg by mouth.  b complex vitamins tablet Take 1 Tab by mouth daily.  PSYLLIUM SEED, WITH DEXTROSE, (FIBER PO) Take  by mouth.  LACTOBACILLUS ACIDOPHILUS (PROBIOTIC PO) Take  by mouth.  glucose blood VI test strips (FREESTYLE LITE STRIPS) strip Test once daily. Dx code E11.65    Lancets (FREESTYLE LANCETS) misc Test once daily. Dx code E11.65    spironolactone (ALDACTONE) 50 mg tablet Take  by mouth daily.  rosuvastatin (CRESTOR) 5 mg tablet Take  by mouth nightly.     candesartan-hydrochlorothiazide (ATACAND HCT) 32-12.5 mg per tablet Take 1 Tab by mouth daily.  aspirin delayed-release 81 mg tablet Take  by mouth daily.  calcium citrate-vitamin D3 (CITRACAL WITH VITAMIN D MAXIMUM) tablet Take  by mouth two (2) times a day.  ASCORBATE CALCIUM (COLLINS-C PO) Take  by mouth.  omega-3 fatty acids-vitamin e 1,000 mg cap Take 1 Cap by mouth.  TURMERIC ROOT EXTRACT PO Take  by mouth.  MULTIVITAMIN WITH MINERALS (HAIR,SKIN AND NAILS PO) Take  by mouth.  glipiZIDE (GLUCOTROL) 5 mg tablet Take 1/2 tab 20 minutes before Breakfast and 1/2 tab 20 minutes before Dinner     No current facility-administered medications for this visit. Review of Systems   Constitutional: Negative. HENT: Negative. Eyes: Negative for pain and redness. Respiratory: Negative. Cardiovascular: Negative for chest pain, palpitations and leg swelling. Gastrointestinal: Negative. Negative for constipation. Genitourinary: Negative. Musculoskeletal: Negative for myalgias. Skin: Negative. Neurological: Negative. Endo/Heme/Allergies: Negative. Psychiatric/Behavioral: Negative for depression and memory loss. The patient does not have insomnia. Physical Exam   Constitutional: She is oriented to person, place, and time. She appears well-developed and well-nourished. HENT:   Head: Normocephalic. Eyes: Conjunctivae and EOM are normal. Pupils are equal, round, and reactive to light. Neck: Normal range of motion. Neck supple. No JVD present. No tracheal deviation present. No thyromegaly present. Cardiovascular: Normal rate, regular rhythm and normal heart sounds. No murmur heard. Pulmonary/Chest: Breath sounds normal.   Abdominal: Soft. Bowel sounds are normal.   Musculoskeletal: Normal range of motion. Lymphadenopathy:     She has no cervical adenopathy. Neurological: She is alert and oriented to person, place, and time. She has normal reflexes. Skin: Skin is warm.    Psychiatric: She has a normal mood and affect. Lab Results   Component Value Date/Time    Hemoglobin A1c 6.7 06/07/2017 09:18 AM    Hemoglobin A1c 7.6 03/07/2017 09:43 AM    Hemoglobin A1c 7.4 12/01/2016 08:45 AM    Glucose 150 06/07/2017 09:18 AM    Microalb/Creat ratio (ug/mg creat.) 8.9 06/07/2017 09:18 AM    LDL, calculated 73 06/07/2017 09:18 AM    Creatinine 0.67 06/07/2017 09:18 AM      Lab Results   Component Value Date/Time    Cholesterol, total 177 06/07/2017 09:18 AM    HDL Cholesterol 40 06/07/2017 09:18 AM    LDL, calculated 73 06/07/2017 09:18 AM    Triglyceride 319 06/07/2017 09:18 AM       Lab Results   Component Value Date/Time    ALT (SGPT) 15 06/07/2017 09:18 AM    AST (SGOT) 16 06/07/2017 09:18 AM    Alk. phosphatase 55 06/07/2017 09:18 AM    Bilirubin, total 1.3 06/07/2017 09:18 AM       Lab Results   Component Value Date/Time    GFR est  06/07/2017 09:18 AM    GFR est non-AA 89 06/07/2017 09:18 AM    Creatinine 0.67 06/07/2017 09:18 AM    BUN 20 06/07/2017 09:18 AM    Sodium 142 06/07/2017 09:18 AM    Potassium 4.2 06/07/2017 09:18 AM    Chloride 98 06/07/2017 09:18 AM    CO2 24 06/07/2017 09:18 AM            ASSESSMENT and PLAN    1. Type 2 DM uncontrolled : a1c is  6.7 %      From   June 2017     Compared to   7.6 %    From  March 2017    Compared to 7 .4%  From dec 2016  Compared to  7%     From Aug  2016  Compared   To 7.1 %   From march 2016     Discussed patho-physiology of DM 2 again , emphasized on TLC  She generally dislikes being on any kind of  prescibed meds, but ironically she takes several vitamin supplements    ASKED her to be more natural , and reminded her of eating home prepared meals     Tolerating the metformin interestingly and  glipizide at small doses   TLC emphasized   Discussed inj incretins but she rejected them     Patient is advised to check blood sugars 1-2 times daily by rotation method.   reviewed medications and side effects in detail  lab results and schedule of future lab studies reviewed with patient        2. Hypoglycemia :  Educated on treating the hypoglycemia. 3. HTN : continue aldactone and atacand -hctz. Patient is educated about importance of compliance with anti-hypertensives especially ARB/ACEI    4. Dyslipidemia : continue crestor . Patient is educated about benefits and adverse effects of statins and explained how benefits outweigh risk. 5. use of aspirin to prevent MI and TIA's discussed        6. Low BMD  :  Date : March  10  th   2016   Bone DEXA  ap spine T-score 0.3; left femoral Neck T- score  -1.5, right femoral neck T-score-1.3  Date : march 2014   Bone DEXA  ap spine T-score 0.0; left femoral Neck T- score -2.0, right femoral neck T-score -0.5    No prior h/o fractures   Discussed findings, reassured her that there is no suggestion of osteoporosis  She could benefit from Prolia use   Will do weekly fosamax first   She needs to be on calcium and vit d supplementation      7. Obesity : Body mass index is 37.77 kg/(m^2).   She has  improved her diet significantly and she had good weight loss   Commended her       F/u in 6 months     > 50 % of time is spent on counseling

## 2017-12-05 DIAGNOSIS — E11.65 TYPE 2 DIABETES MELLITUS WITH HYPERGLYCEMIA, WITHOUT LONG-TERM CURRENT USE OF INSULIN (HCC): Primary | ICD-10-CM

## 2017-12-05 RX ORDER — LANCETS
EACH MISCELLANEOUS
Qty: 50 EACH | Refills: 6 | Status: SHIPPED | OUTPATIENT
Start: 2017-12-05 | End: 2020-04-20 | Stop reason: SDUPTHER

## 2017-12-05 NOTE — TELEPHONE ENCOUNTER
Patient came into office please send Teststrips and Lancets to [address] [place] [address]. Need them ASAP.  Thank you

## 2018-02-23 ENCOUNTER — TELEPHONE (OUTPATIENT)
Dept: ENDOCRINOLOGY | Age: 73
End: 2018-02-23

## 2018-02-23 DIAGNOSIS — E11.65 TYPE 2 DIABETES MELLITUS WITH HYPERGLYCEMIA, WITHOUT LONG-TERM CURRENT USE OF INSULIN (HCC): Primary | ICD-10-CM

## 2018-02-28 ENCOUNTER — HOSPITAL ENCOUNTER (OUTPATIENT)
Dept: LAB | Age: 73
Discharge: HOME | End: 2018-02-28
Payer: MEDICARE

## 2018-02-28 PROCEDURE — 80053 COMPREHEN METABOLIC PANEL: CPT

## 2018-02-28 PROCEDURE — 82043 UR ALBUMIN QUANTITATIVE: CPT

## 2018-02-28 PROCEDURE — 83036 HEMOGLOBIN GLYCOSYLATED A1C: CPT

## 2018-02-28 PROCEDURE — 36415 COLL VENOUS BLD VENIPUNCTURE: CPT

## 2018-02-28 PROCEDURE — 80061 LIPID PANEL: CPT

## 2018-03-07 ENCOUNTER — OFFICE VISIT (OUTPATIENT)
Dept: ENDOCRINOLOGY | Age: 73
End: 2018-03-07

## 2018-03-07 VITALS
DIASTOLIC BLOOD PRESSURE: 57 MMHG | HEIGHT: 66 IN | HEART RATE: 77 BPM | WEIGHT: 233.3 LBS | RESPIRATION RATE: 18 BRPM | SYSTOLIC BLOOD PRESSURE: 124 MMHG | TEMPERATURE: 96.9 F | BODY MASS INDEX: 37.49 KG/M2 | OXYGEN SATURATION: 98 %

## 2018-03-07 DIAGNOSIS — N17.0 ACUTE RENAL FAILURE WITH TUBULAR NECROSIS (HCC): ICD-10-CM

## 2018-03-07 DIAGNOSIS — E11.65 TYPE 2 DIABETES MELLITUS WITH HYPERGLYCEMIA, WITHOUT LONG-TERM CURRENT USE OF INSULIN (HCC): Primary | ICD-10-CM

## 2018-03-07 DIAGNOSIS — I10 ESSENTIAL HYPERTENSION: ICD-10-CM

## 2018-03-07 DIAGNOSIS — E78.2 MIXED HYPERLIPIDEMIA: ICD-10-CM

## 2018-03-07 RX ORDER — DIAPER,BRIEF,INFANT-TODD,DISP
EACH MISCELLANEOUS
COMMUNITY
End: 2019-10-16 | Stop reason: ALTCHOICE

## 2018-03-07 RX ORDER — CANDESARTAN 16 MG/1
16 TABLET ORAL DAILY
Qty: 30 TAB | Refills: 6 | Status: SHIPPED | OUTPATIENT
Start: 2018-03-07 | End: 2020-10-14

## 2018-03-07 RX ORDER — SPIRONOLACTONE 25 MG/1
25 TABLET ORAL 2 TIMES DAILY
Qty: 60 TAB | Refills: 6 | Status: SHIPPED | OUTPATIENT
Start: 2018-03-07

## 2018-03-07 RX ORDER — METFORMIN HYDROCHLORIDE 500 MG/1
1000 TABLET, EXTENDED RELEASE ORAL 2 TIMES DAILY WITH MEALS
Qty: 120 TAB | Refills: 6 | Status: SHIPPED | OUTPATIENT
Start: 2018-03-07 | End: 2018-10-16 | Stop reason: SDUPTHER

## 2018-03-07 NOTE — PATIENT INSTRUCTIONS
metformin ER to 1000 mg  Twice a day with meals       Stop atacand-hctz  Start on atacand 16 mg a day           --------------------------------------------------------------------------------------------------------------------------    Cut the spironolactone 50 mg to half pill and take it twice a day     ------------------------------------------------------------------------------------------------------------------    Do not skip meals  Do not eat in between meals    Reduce carbs- pasta, rice, potatoes, bread   Do not drink juices or sodas  Donot eat peanut butter     Do not eat sugar free cookies and cakes   Do not eat peaches, grapes, oranges, pineapples     ------------------------------------------------------------------------------------------------------------------

## 2018-03-07 NOTE — MR AVS SNAPSHOT
49 [facility] [address] 
[phone] Patient: Julianna Trimble MRN: [id number] XXV:2/46/[id number] Visit Information Date & Time Provider Department Dept. Phone Encounter #  
 3/7/2018 10:00 AM Maria G Scales MD [facility] [phone] [id number] Follow-up Instructions Return in about 5 weeks (around 4/11/2018). Upcoming Health Maintenance Date Due Hepatitis C Screening 1945 FOOT EXAM Q1 5/20/1956 EYE EXAM RETINAL OR DILATED Q1 5/20/1956 DTaP/Tdap/Td series (1 - Tdap) 5/20/1967 BREAST CANCER SCRN MAMMOGRAM 5/20/1996 FOBT Q 1 YEAR AGE 50-75 5/20/1996 ZOSTER VACCINE AGE 60> 3/20/2006 GLAUCOMA SCREENING Q2Y 5/20/2011 Pneumococcal 65+ Low/Medium Risk (1 of 2 - PCV13) 5/20/2011 MEDICARE YEARLY EXAM 5/20/2011 Influenza Age 5 to Adult 8/1/2017 HEMOGLOBIN A1C Q6M 8/28/2018 MICROALBUMIN Q1 2/28/2019 LIPID PANEL Q1 2/28/2019 Allergies as of 3/7/2018  Review Complete On: 3/7/2018 By: Maria G Scales MD  
 No Known Allergies Current Immunizations  Never Reviewed No immunizations on file. Not reviewed this visit Vitals BP Pulse Temp Resp Height(growth percentile) Weight(growth percentile) 124/57 (BP 1 Location: Right arm, BP Patient Position: Sitting) 77 96.9 °F (36.1 °C) (Oral) 18 5' 6\" (1.676 m) 233 lb 4.8 oz (105.8 kg) SpO2 BMI OB Status Smoking Status 98% 37.66 kg/m2 Postmenopausal Never Smoker BMI and BSA Data Body Mass Index Body Surface Area  
 37.66 kg/m 2 2.22 m 2 Preferred Pharmacy Pharmacy Name Phone [facility] [address] [phone] Your Updated Medication List  
  
   
This list is accurate as of 3/7/18 10:43 AM.  Always use your most recent med list. ALLEGRA PO Take  by mouth as needed. aspirin delayed-release 81 mg tablet Take  by mouth daily. b complex vitamins tablet Take 1 Tab by mouth daily. biotin 10,000 mcg Cap Take  by mouth.  
  
 calcium citrate-vitamin D3 tablet Commonly known as:  CITRACAL WITH VITAMIN D MAXIMUM Take  by mouth two (2) times a day. candesartan-hydroCHLOROthiazide 32-12.5 mg per tablet Commonly known as:  ATACAND HCT Take 1 Tab by mouth daily. COLLINS-C PO Take  by mouth. FIBER PO Take  by mouth. glucose blood VI test strips strip Commonly known as:  FREESTYLE LITE STRIPS Test once daily. Dx code E11.65 HAIR,SKIN AND NAILS PO Take  by mouth. Lancets Misc Test once daily. Dx code E11.65  
  
 loratadine 10 mg tablet Commonly known as:  Kenia Hum Take 10 mg by mouth.  
  
 metFORMIN  mg tablet Commonly known as:  GLUCOPHAGE XR Take 2 Tabs by mouth two (2) times daily (with meals). omega-3 fatty acids-vitamin e 1,000 mg Cap Take 1 Cap by mouth. OSTEO BI-FLEX PO Take  by mouth. PROBIOTIC PO Take  by mouth. rosuvastatin 5 mg tablet Commonly known as:  CRESTOR Take  by mouth nightly. spironolactone 25 mg tablet Commonly known as:  ALDACTONE Take 1 Tab by mouth two (2) times a day. Stop 50 mg dose TURMERIC ROOT EXTRACT PO Take  by mouth. Follow-up Instructions Return in about 5 weeks (around 4/11/2018). Patient Instructions   
 
 
 
 metformin ER to 1000 mg  Twice a day with meals Stop atacand-hctz Start on atacand 16 mg a day  
 
 
 
 
-------------------------------------------------------------------------------------------------------------------------- Cut the spironolactone 50 mg to half pill and take it twice a day  
 
------------------------------------------------------------------------------------------------------------------ Do not skip meals Do not eat in between meals Reduce carbs- pasta, rice, potatoes, bread Do not drink juices or sodas Donot eat peanut butter Do not eat sugar free cookies and cakes Do not eat peaches, grapes, oranges, pineapples  
 
------------------------------------------------------------------------------------------------------------------ Introducing hospitals & HEALTH SERVICES! Susu Pendleton introduces Poppin patient portal. Now you can access parts of your medical record, email your doctor's office, and request medication refills online. 1. In your internet browser, go to [email or web address] 2. Click on the First Time User? Click Here link in the Sign In box. You will see the New Member Sign Up page. 3. Enter your Poppin Access Code exactly as it appears below. You will not need to use this code after youve completed the sign-up process. If you do not sign up before the expiration date, you must request a new code. · Poppin Access Code: [email or web address] Expires: 6/5/2018 10:41 AM 
 
4. Enter the last four digits of your Social Security Number (xxxx) and Date of Birth (mm/dd/yyyy) as indicated and click Submit. You will be taken to the next sign-up page. 5. Create a Telirist ID. This will be your Poppin login ID and cannot be changed, so think of one that is secure and easy to remember. 6. Create a Poppin password. You can change your password at any time. 7. Enter your Password Reset Question and Answer. This can be used at a later time if you forget your password. 8. Enter your e-mail address. You will receive e-mail notification when new information is available in [address] 9. Click Sign Up. You can now view and download portions of your medical record. 10. Click the Download Summary menu link to download a portable copy of your medical information. If you have questions, please visit the Frequently Asked Questions section of the Poppin website. Remember, Poppin is NOT to be used for urgent needs. For medical emergencies, dial 911. Now available from your iPhone and Android! Please provide this summary of care documentation to your next provider. Your primary care clinician is listed as Concepción Meade. If you have any questions after today's visit, please call [phone].

## 2018-03-07 NOTE — PROGRESS NOTES
Wt Readings from Last 3 Encounters:   03/07/18 233 lb 4.8 oz (105.8 kg)   06/14/17 234 lb (106.1 kg)   03/14/17 243 lb (110.2 kg)     Temp Readings from Last 3 Encounters:   03/07/18 96.9 °F (36.1 °C) (Oral)   06/14/17 98.1 °F (36.7 °C) (Oral)   03/14/17 97.9 °F (36.6 °C) (Oral)     BP Readings from Last 3 Encounters:   03/07/18 124/57   06/14/17 124/51   03/14/17 147/70     Pulse Readings from Last 3 Encounters:   03/07/18 77   06/14/17 76   03/14/17 78     Lab Results   Component Value Date/Time    Hemoglobin A1c 6.7 (H) 02/28/2018 08:48 AM     No meter or logbook today

## 2018-03-07 NOTE — PROGRESS NOTES
HISTORY OF PRESENT ILLNESS  Candice Patel is a 71 y.o. female. HPI  Patient here for  F/u after last  visit of Type 2 diabetes mellitus  And osteoporosis  From june 2017     She lost 1 lbs     She is tolerant of metformin ER  Dr. Ignacio Gibson  Increased crestor to 10 mg         . Referred : by self    H/o diabetes for 1 year     Current A1C is 7.1 % and symptoms/problems include none     Current diabetic medications include metformin. She never liked this medicaiton    Current monitoring regimen: home blood tests - daily  Home blood sugar records: trend: fluctuating a bit   Any episodes of hypoglycemia? no    Weight trend: stable  Prior visit with dietician: no  Current diet: \"unhealthy\" diet in general  Current exercise: no regular exercise, knee injury     Known diabetic complications: none  Cardiovascular risk factors: dyslipidemia, diabetes mellitus, obesity, hypertension, stress, post-menopausal    Eye exam current (within one year): yes  STANISLAW: no     Past Medical History:   Diagnosis Date    HTN (hypertension)     Muscle ache     Muscle pain     Stiff joint      Past Surgical History:   Procedure Laterality Date    HX CHOLECYSTECTOMY  1976    HX KNEE REPLACEMENT Right 2012     Current Outpatient Prescriptions   Medication Sig    FEXOFENADINE HCL (ALLEGRA PO) Take  by mouth as needed.  biotin 10,000 mcg cap Take  by mouth.  Lancets misc Test once daily. Dx code E11.65    glucose blood VI test strips (FREESTYLE LITE STRIPS) strip Test once daily. Dx code E11.65    spironolactone (ALDACTONE) 25 mg tablet Take 1 Tab by mouth two (2) times a day. Stop 50 mg dose    metFORMIN ER (GLUCOPHAGE XR) 500 mg tablet Take 2 Tabs by mouth two (2) times daily (with meals).  GLUCOSAMINE HCL/CHONDR RODRIGUES A NA (OSTEO BI-FLEX PO) Take  by mouth.  loratadine (CLARITIN) 10 mg tablet Take 10 mg by mouth.  b complex vitamins tablet Take 1 Tab by mouth daily.     PSYLLIUM SEED, WITH DEXTROSE, (FIBER PO) Take  by mouth.    LACTOBACILLUS ACIDOPHILUS (PROBIOTIC PO) Take  by mouth.  rosuvastatin (CRESTOR) 5 mg tablet Take  by mouth nightly.  candesartan-hydrochlorothiazide (ATACAND HCT) 32-12.5 mg per tablet Take 1 Tab by mouth daily.  aspirin delayed-release 81 mg tablet Take  by mouth daily.  calcium citrate-vitamin D3 (CITRACAL WITH VITAMIN D MAXIMUM) tablet Take  by mouth two (2) times a day.  ASCORBATE CALCIUM (COLLINS-C PO) Take  by mouth.  omega-3 fatty acids-vitamin e 1,000 mg cap Take 1 Cap by mouth.  TURMERIC ROOT EXTRACT PO Take  by mouth.  MULTIVITAMIN WITH MINERALS (HAIR,SKIN AND NAILS PO) Take  by mouth.  glipiZIDE (GLUCOTROL) 5 mg tablet Take 1/2 tab 20 minutes before Breakfast and 1/2 tab 20 minutes before Dinner     No current facility-administered medications for this visit. Review of Systems   Constitutional: Negative. HENT: Negative. Eyes: Negative for pain and redness. Respiratory: Negative. Cardiovascular: Negative for chest pain, palpitations and leg swelling. Gastrointestinal: Negative. Negative for constipation. Genitourinary: Negative. Musculoskeletal: Negative for myalgias. Skin: Negative. Neurological: Negative. Endo/Heme/Allergies: Negative. Psychiatric/Behavioral: Negative for depression and memory loss. The patient does not have insomnia. Physical Exam   Constitutional: She is oriented to person, place, and time. She appears well-developed and well-nourished. HENT:   Head: Normocephalic. Eyes: Conjunctivae and EOM are normal. Pupils are equal, round, and reactive to light. Neck: Normal range of motion. Neck supple. No JVD present. No tracheal deviation present. No thyromegaly present. Cardiovascular: Normal rate, regular rhythm and normal heart sounds. No murmur heard. Pulmonary/Chest: Breath sounds normal.   Abdominal: Soft. Bowel sounds are normal.   Musculoskeletal: Normal range of motion. Lymphadenopathy:     She has no cervical adenopathy. Neurological: She is alert and oriented to person, place, and time. She has normal reflexes. Skin: Skin is warm. Psychiatric: She has a normal mood and affect. Diabetic foot exam: March 2018    Left: Reflexes nd     Vibratory sensation normal    Proprioception nd   Sharp/dull discrimination nd    Filament test normal sensation with micro filament   Pulse DP: 2+ (normal)   Pulse PT: nd   Deformities: None  Right: Reflexes nd   Vibratory sensation normal   Proprioception nd   Sharp/dull discrimination nd   Filament test normal sensation with micro filament   Pulse DP: 2+ (normal)   Pulse PT: nd   Deformities: None      Lab Results   Component Value Date/Time    Hemoglobin A1c 6.7 (H) 02/28/2018 08:48 AM    Hemoglobin A1c 6.7 (H) 06/07/2017 09:18 AM    Hemoglobin A1c 7.6 (H) 03/07/2017 09:43 AM    Glucose 149 (H) 02/28/2018 08:48 AM    Microalb/Creat ratio (ug/mg creat.) 79.6 (H) 02/28/2018 08:48 AM    LDL, calculated Comment 02/28/2018 08:48 AM    Creatinine 1.38 (H) 02/28/2018 08:48 AM      Lab Results   Component Value Date/Time    Cholesterol, total 210 (H) 02/28/2018 08:48 AM    HDL Cholesterol 39 (L) 02/28/2018 08:48 AM    LDL, calculated Comment 02/28/2018 08:48 AM    Triglyceride 431 (H) 02/28/2018 08:48 AM       Lab Results   Component Value Date/Time    ALT (SGPT) 17 02/28/2018 08:48 AM    AST (SGOT) 24 02/28/2018 08:48 AM    Alk. phosphatase 56 02/28/2018 08:48 AM    Bilirubin, total 1.2 02/28/2018 08:48 AM       Lab Results   Component Value Date/Time    GFR est AA 44 (L) 02/28/2018 08:48 AM    GFR est non-AA 38 (L) 02/28/2018 08:48 AM    Creatinine 1.38 (H) 02/28/2018 08:48 AM    BUN 52 (H) 02/28/2018 08:48 AM    Sodium 142 02/28/2018 08:48 AM    Potassium 4.6 02/28/2018 08:48 AM    Chloride 102 02/28/2018 08:48 AM    CO2 21 02/28/2018 08:48 AM      ASSESSMENT and PLAN    1.   Type 2 DM uncontrolled : a1c is  6.7 %    From  Feb 2018 Compared to   6.7 %      From   June 2017     Compared to   7.6 %    From  March 2017    Compared to 7 .4%  From dec 2016  Compared to  7%     From Aug  2016  Compared   To 7.1 %   From march 2016     Discussed patho-physiology of DM 2 again , emphasized on TLC  She generally dislikes being on any kind of  prescibed meds, but ironically she takes several vitamin supplements    ASKED her to be more natural , and reminded her of eating home prepared meals     Tolerating the metformin interestingly and  glipizide at small doses   TLC emphasized   Discussed inj incretins but she rejected them     Patient is advised to check blood sugars 1-2 times daily by rotation method. reviewed medications and side effects in detail  lab results and schedule of future lab studies reviewed with patient        2. Hypoglycemia :  Educated on treating the hypoglycemia. 3. HTN : continue aldactone and atacand -hctz. Patient is educated about importance of compliance with anti-hypertensives especially ARB/ACEI    4. Dyslipidemia :  High TG , unable to  her LDL   Dr. Cristian Perdue increased crestor to 10    continue crestor . Patient is educated about benefits and adverse effects of statins and explained how benefits outweigh risk. 5. use of aspirin to prevent MI and TIA's discussed        6. Low BMD  :  Date : March  10  th   2016   Bone DEXA  ap spine T-score 0.3; left femoral Neck T- score  -1.5, right femoral neck T-score-1.3  Date : march 2014   Bone DEXA  ap spine T-score 0.0; left femoral Neck T- score -2.0, right femoral neck T-score -0.5    No prior h/o fractures   Discussed findings, reassured her that there is no suggestion of osteoporosis  She could benefit from Prolia use   Will do weekly fosamax first   She needs to be on calcium and vit d supplementation      7. Obesity : Body mass index is 37.66 kg/(m^2). She has  improved her diet significantly and she had good weight loss   Commended her       8.  Mild CKD - significant decline noted  Will stop hctz          F/u in 6 months     > 50 % of time is spent on counseling   Patient voiced understanding her plan of care

## 2018-04-11 ENCOUNTER — HOSPITAL ENCOUNTER (OUTPATIENT)
Dept: LAB | Age: 73
Discharge: HOME | End: 2018-04-11
Payer: MEDICARE

## 2018-04-11 PROCEDURE — 80053 COMPREHEN METABOLIC PANEL: CPT

## 2018-04-11 PROCEDURE — 36415 COLL VENOUS BLD VENIPUNCTURE: CPT

## 2018-04-18 ENCOUNTER — OFFICE VISIT (OUTPATIENT)
Dept: ENDOCRINOLOGY | Age: 73
End: 2018-04-18

## 2018-04-18 VITALS
SYSTOLIC BLOOD PRESSURE: 137 MMHG | BODY MASS INDEX: 37.54 KG/M2 | RESPIRATION RATE: 18 BRPM | DIASTOLIC BLOOD PRESSURE: 66 MMHG | HEIGHT: 66 IN | WEIGHT: 233.6 LBS | HEART RATE: 68 BPM | TEMPERATURE: 97.1 F | OXYGEN SATURATION: 98 %

## 2018-04-18 DIAGNOSIS — E66.01 CLASS 2 SEVERE OBESITY DUE TO EXCESS CALORIES WITH SERIOUS COMORBIDITY AND BODY MASS INDEX (BMI) OF 37.0 TO 37.9 IN ADULT (HCC): ICD-10-CM

## 2018-04-18 DIAGNOSIS — I10 ESSENTIAL HYPERTENSION: ICD-10-CM

## 2018-04-18 DIAGNOSIS — E78.2 MIXED HYPERLIPIDEMIA: ICD-10-CM

## 2018-04-18 DIAGNOSIS — E11.65 TYPE 2 DIABETES MELLITUS WITH HYPERGLYCEMIA, WITHOUT LONG-TERM CURRENT USE OF INSULIN (HCC): Primary | ICD-10-CM

## 2018-04-18 NOTE — MR AVS SNAPSHOT
49 [facility] [address] 
[phone] Patient: Dion Warner MRN: [id number]:5/85/6191 Visit Information Date & Time Provider Department Dept. Phone Encounter #  
 4/18/2018 10:15 AM Leah Briones MD [facility] [phone] [id number] Follow-up Instructions Return in about 4 months (around 8/18/2018). Upcoming Health Maintenance Date Due Hepatitis C Screening 1945 FOOT EXAM Q1 5/20/1956 DTaP/Tdap/Td series (1 - Tdap) 5/20/1967 BREAST CANCER SCRN MAMMOGRAM 5/20/1996 FOBT Q 1 YEAR AGE 50-75 5/20/1996 ZOSTER VACCINE AGE 60> 3/20/2006 Pneumococcal 65+ High/Highest Risk (1 of 2 - PCV13) 5/20/2011 Influenza Age 5 to Adult 8/1/2017 MEDICARE YEARLY EXAM 3/14/2018 HEMOGLOBIN A1C Q6M 8/28/2018 EYE EXAM RETINAL OR DILATED Q1 2/16/2019 MICROALBUMIN Q1 2/28/2019 LIPID PANEL Q1 2/28/2019 GLAUCOMA SCREENING Q2Y 2/16/2020 Allergies as of 4/18/2018  Review Complete On: 4/18/2018 By: Leah Briones MD  
 No Known Allergies Current Immunizations  Never Reviewed No immunizations on file. Not reviewed this visit You Were Diagnosed With   
  
 Codes Comments Type 2 diabetes mellitus with hyperglycemia, without long-term current use of insulin (HCC)    -  Primary ICD-10-CM: E11.65 ICD-9-CM: 250.00, 790.29 Essential hypertension     ICD-10-CM: I10 
ICD-9-CM: 401.9 Mixed hyperlipidemia     ICD-10-CM: E78.2 ICD-9-CM: 272.2 Class 2 severe obesity due to excess calories with serious comorbidity and body mass index (BMI) of 37.0 to 37.9 in adult [facility])     ICD-10-CM: E66.01, O12.86 ICD-9-CM: 278.01, V85.37 Vitals BP Pulse Temp Resp Height(growth percentile) Weight(growth percentile)  
 137/66 (BP 1 Location: Right arm, BP Patient Position: Sitting) 68 97.1 °F (36.2 °C) (Oral) 18 5' 6\" (1.676 m) 233 lb 9.6 oz (106 kg) SpO2 BMI OB Status Smoking Status 98% 37.7 kg/m2 Postmenopausal Never Smoker Vitals History BMI and BSA Data Body Mass Index Body Surface Area 37.7 kg/m 2 2.22 m 2 Preferred Pharmacy Pharmacy Name Phone [facility] [address] [phone] Your Updated Medication List  
  
   
This list is accurate as of 4/18/18 10:55 AM.  Always use your most recent med list. ALLEGRA PO Take  by mouth as needed. aspirin delayed-release 81 mg tablet Take  by mouth daily. b complex vitamins tablet Take 1 Tab by mouth daily. biotin 10,000 mcg Cap Take  by mouth.  
  
 calcium citrate-vitamin D3 tablet Commonly known as:  CITRACAL WITH VITAMIN D MAXIMUM Take  by mouth two (2) times a day. candesartan 16 mg tablet Commonly known as:  ATACAND Take 1 Tab by mouth daily. Stop atacand hctz COLLINS-C PO Take  by mouth. FIBER PO Take  by mouth. glucose blood VI test strips strip Commonly known as:  FREESTYLE LITE STRIPS Test once daily. Dx code E11.65 HAIR,SKIN AND NAILS PO Take  by mouth. Lancets Misc Test once daily. Dx code E11.65  
  
 loratadine 10 mg tablet Commonly known as:  [place] Pain Take 10 mg by mouth.  
  
 metFORMIN  mg tablet Commonly known as:  GLUCOPHAGE XR Take 2 Tabs by mouth two (2) times daily (with meals). omega-3 fatty acids-vitamin e 1,000 mg Cap Take 1 Cap by mouth. OSTEO BI-FLEX PO Take  by mouth. PROBIOTIC PO Take  by mouth. rosuvastatin 5 mg tablet Commonly known as:  CRESTOR Take 10 mg by mouth nightly. spironolactone 25 mg tablet Commonly known as:  ALDACTONE Take 1 Tab by mouth two (2) times a day. Stop 50 mg dose TURMERIC ROOT EXTRACT PO Take  by mouth. Follow-up Instructions Return in about 4 months (around 8/18/2018). Patient Instructions metformin ER to 1000 mg  Twice a day with meals Stay on  atacand 16 mg a day  
 
 
 
 
-------------------------------------------------------------------------------------------------------------------------- Cut the spironolactone 50 mg to half pill and take it twice a day  
 
------------------------------------------------------------------------------------------------------------------ Do not skip meals Do not eat in between meals Reduce carbs- pasta, rice, potatoes, bread Do not drink juices or sodas Donot eat peanut butter Do not eat sugar free cookies and cakes Do not eat peaches, grapes, oranges, pineapples  
 
------------------------------------------------------------------------------------------------------------------ Introducing [facility] & HEALTH SERVICES! Jazmin Gorman introduces Guardly patient portal. Now you can access parts of your medical record, email your doctor's office, and request medication refills online. 1. In your internet browser, go to [email or web address] 2. Click on the First Time User? Click Here link in the Sign In box. You will see the New Member Sign Up page. 3. Enter your Guardly Access Code exactly as it appears below. You will not need to use this code after youve completed the sign-up process. If you do not sign up before the expiration date, you must request a new code. · Guardly Access Code: [email or web address] Expires: 6/5/2018 11:41 AM 
 
4. Enter the last four digits of your Social Security Number (xxxx) and Date of Birth (mm/dd/yyyy) as indicated and click Submit. You will be taken to the next sign-up page. 5. Create a MyChart ID. This will be your Curiosidyt login ID and cannot be changed, so think of one that is secure and easy to remember. 6. Create a Curiosidyt password. You can change your password at any time. 7. Enter your Password Reset Question and Answer.  This can be used at a later time if you forget your password. 8. Enter your e-mail address. You will receive e-mail notification when new information is available in [address] 9. Click Sign Up. You can now view and download portions of your medical record. 10. Click the Download Summary menu link to download a portable copy of your medical information. If you have questions, please visit the Frequently Asked Questions section of the [employer] website. Remember, [employer] is NOT to be used for urgent needs. For medical emergencies, dial 911. Now available from your iPhone and Android! Please provide this summary of care documentation to your next provider. Your primary care clinician is listed as Anh Suarez. If you have any questions after today's visit, please call [phone].

## 2018-04-18 NOTE — PATIENT INSTRUCTIONS
metformin ER to 1000 mg  Twice a day with meals       Stay on  atacand 16 mg a day           --------------------------------------------------------------------------------------------------------------------------    Cut the spironolactone 50 mg to half pill and take it twice a day     ------------------------------------------------------------------------------------------------------------------    Do not skip meals  Do not eat in between meals    Reduce carbs- pasta, rice, potatoes, bread   Do not drink juices or sodas  Donot eat peanut butter     Do not eat sugar free cookies and cakes   Do not eat peaches, grapes, oranges, pineapples     ------------------------------------------------------------------------------------------------------------------

## 2018-04-18 NOTE — PROGRESS NOTES
HISTORY OF PRESENT ILLNESS  Brent Evangelista is a 71 y.o. female. HPI  Patient here for  F/u after last  visit of Type 2 diabetes mellitus  And osteoporosis  From March 2018     She lost 1 lbs     She is f/u for ARF  She had high creat level in feb 2018   She had changed to new pcp     She was taking motrin high dose 600 mg, instead of metformin out of confusion - ( they look alike per her )        . Referred : by self    H/o diabetes for 1 year     Current A1C is 7.1 % and symptoms/problems include none     Current diabetic medications include metformin. She never liked this medicaiton    Current monitoring regimen: home blood tests - daily  Home blood sugar records: trend: fluctuating a bit   Any episodes of hypoglycemia? no    Weight trend: stable  Prior visit with dietician: no  Current diet: \"unhealthy\" diet in general  Current exercise: no regular exercise, knee injury     Known diabetic complications: none  Cardiovascular risk factors: dyslipidemia, diabetes mellitus, obesity, hypertension, stress, post-menopausal    Eye exam current (within one year): yes  STANISLAW: no     Past Medical History:   Diagnosis Date    HTN (hypertension)     Muscle ache     Muscle pain     Stiff joint      Past Surgical History:   Procedure Laterality Date    HX CHOLECYSTECTOMY  1976    HX KNEE REPLACEMENT Right 2012     Current Outpatient Prescriptions   Medication Sig    FEXOFENADINE HCL (ALLEGRA PO) Take  by mouth as needed.  metFORMIN ER (GLUCOPHAGE XR) 500 mg tablet Take 2 Tabs by mouth two (2) times daily (with meals).  spironolactone (ALDACTONE) 25 mg tablet Take 1 Tab by mouth two (2) times a day. Stop 50 mg dose    candesartan (ATACAND) 16 mg tablet Take 1 Tab by mouth daily. Stop atacand hctz    Lancets misc Test once daily. Dx code E11.65    glucose blood VI test strips (FREESTYLE LITE STRIPS) strip Test once daily. Dx code E11.65    GLUCOSAMINE HCL/CHONDR RODRIGUES A NA (OSTEO BI-FLEX PO) Take  by mouth.     PSYLLIUM SEED, WITH DEXTROSE, (FIBER PO) Take  by mouth.  rosuvastatin (CRESTOR) 5 mg tablet Take 10 mg by mouth nightly.  aspirin delayed-release 81 mg tablet Take  by mouth daily.  calcium citrate-vitamin D3 (CITRACAL WITH VITAMIN D MAXIMUM) tablet Take  by mouth two (2) times a day.  ASCORBATE CALCIUM (COLLINS-C PO) Take  by mouth.  omega-3 fatty acids-vitamin e 1,000 mg cap Take 1 Cap by mouth.  TURMERIC ROOT EXTRACT PO Take  by mouth.  biotin 10,000 mcg cap Take  by mouth.  loratadine (CLARITIN) 10 mg tablet Take 10 mg by mouth.  b complex vitamins tablet Take 1 Tab by mouth daily.  LACTOBACILLUS ACIDOPHILUS (PROBIOTIC PO) Take  by mouth.  MULTIVITAMIN WITH MINERALS (HAIR,SKIN AND NAILS PO) Take  by mouth. No current facility-administered medications for this visit. Review of Systems   Constitutional: Negative. HENT: Negative. Eyes: Negative for pain and redness. Respiratory: Negative. Cardiovascular: Negative for chest pain, palpitations and leg swelling. Gastrointestinal: Negative. Negative for constipation. Genitourinary: Negative. Musculoskeletal: Negative for myalgias. Skin: Negative. Neurological: Negative. Endo/Heme/Allergies: Negative. Psychiatric/Behavioral: Negative for depression and memory loss. The patient does not have insomnia. Physical Exam   Constitutional: She is oriented to person, place, and time. She appears well-developed and well-nourished. HENT:   Head: Normocephalic. Eyes: Conjunctivae and EOM are normal. Pupils are equal, round, and reactive to light. Neck: Normal range of motion. Neck supple. No JVD present. No tracheal deviation present. No thyromegaly present. Cardiovascular: Normal rate, regular rhythm and normal heart sounds. No murmur heard. Pulmonary/Chest: Breath sounds normal.   Abdominal: Soft. Bowel sounds are normal.   Musculoskeletal: Normal range of motion.    Lymphadenopathy: She has no cervical adenopathy. Neurological: She is alert and oriented to person, place, and time. She has normal reflexes. Skin: Skin is warm. Psychiatric: She has a normal mood and affect. Diabetic foot exam: March 2018    Left: Reflexes nd     Vibratory sensation normal    Proprioception nd   Sharp/dull discrimination nd    Filament test normal sensation with micro filament   Pulse DP: 2+ (normal)   Pulse PT: nd   Deformities: None  Right: Reflexes nd   Vibratory sensation normal   Proprioception nd   Sharp/dull discrimination nd   Filament test normal sensation with micro filament   Pulse DP: 2+ (normal)   Pulse PT: nd   Deformities: None      Lab Results   Component Value Date/Time    Hemoglobin A1c 6.7 (H) 02/28/2018 08:48 AM    Hemoglobin A1c 6.7 (H) 06/07/2017 09:18 AM    Hemoglobin A1c 7.6 (H) 03/07/2017 09:43 AM    Glucose 131 (H) 04/11/2018 09:00 AM    Microalb/Creat ratio (ug/mg creat.) 79.6 (H) 02/28/2018 08:48 AM    LDL, calculated Comment 02/28/2018 08:48 AM    Creatinine 0.71 04/11/2018 09:00 AM      Lab Results   Component Value Date/Time    Cholesterol, total 210 (H) 02/28/2018 08:48 AM    HDL Cholesterol 39 (L) 02/28/2018 08:48 AM    LDL, calculated Comment 02/28/2018 08:48 AM    Triglyceride 431 (H) 02/28/2018 08:48 AM       Lab Results   Component Value Date/Time    ALT (SGPT) 11 04/11/2018 09:00 AM    AST (SGOT) 12 04/11/2018 09:00 AM    Alk. phosphatase 47 04/11/2018 09:00 AM    Bilirubin, total 1.1 04/11/2018 09:00 AM       Lab Results   Component Value Date/Time    GFR est AA 99 04/11/2018 09:00 AM    GFR est non-AA 86 04/11/2018 09:00 AM    Creatinine 0.71 04/11/2018 09:00 AM    BUN 17 04/11/2018 09:00 AM    Sodium 142 04/11/2018 09:00 AM    Potassium 4.3 04/11/2018 09:00 AM    Chloride 99 04/11/2018 09:00 AM    CO2 25 04/11/2018 09:00 AM      ASSESSMENT and PLAN    1.   Type 2 DM uncontrolled : a1c is  6.7 %    From  Feb 2018     Compared to   6.7 %      From   June 2017 Compared to   7.6 %    From  March 2017    Compared to 7 .4%  From dec 2016  Compared to  7%     From Aug  2016  Compared   To 7.1 %   From march 2016     Discussed patho-physiology of DM 2 again , emphasized on TLC  She generally dislikes being on any kind of  prescibed meds, but ironically she takes several vitamin supplements    ASKED her to be more natural , and reminded her of eating home prepared meals     Tolerating the metformin interestingly and  Is no longer on glipizide    TLC emphasized   Discussed inj incretins but she rejected them     Patient is advised to check blood sugars 1-2 times daily by rotation method. reviewed medications and side effects in detail  lab results and schedule of future lab studies reviewed with patient        2. Hypoglycemia :  Educated on treating the hypoglycemia. 3. HTN : continue aldactone  25 mg bid  and atacand . She was stopped from HCTZ for mild renal failure, despite which she is doing good   Patient is educated about importance of compliance with anti-hypertensives especially ARB/ACEI    4. Dyslipidemia :  High TG , unable to  her LDL   Dr. Reji Tejeda increased crestor to 10 ,   Labs in march 2018 did nto have chl    continue crestor . Patient is educated about benefits and adverse effects of statins and explained how benefits outweigh risk. 5. use of aspirin to prevent MI and TIA's discussed        6. Low BMD  :  Date : March  10  th   2016   Bone DEXA  ap spine T-score 0.3; left femoral Neck T- score  -1.5, right femoral neck T-score-1.3  Date : march 2014   Bone DEXA  ap spine T-score 0.0; left femoral Neck T- score -2.0, right femoral neck T-score -0.5    No prior h/o fractures   Discussed findings, reassured her that there is no suggestion of osteoporosis  She could benefit from Prolia use   Will do weekly fosamax first   She needs to be on calcium and vit d supplementation      7. Obesity : Body mass index is 37.7 kg/(m^2).   She has  improved her diet significantly and she had good weight loss   Commended her           8. Mild CKD - resolved   significant decline noted in short period of time   I stopped hctz, but pt figured out that she mistakenly took motrin high doses       Noticed anemia on pcp labs. She is c/o fibromyalgia pains and is asking me if she could take aleve.  Asked her to f/u with pcp for neurontin for fibromyalgia        F/u in 6 months     > 50 % of time is spent on counseling   Patient voiced understanding her plan of care

## 2018-04-18 NOTE — PROGRESS NOTES
Chief Complaint   Patient presents with    Diabetes     1. Have you been to the ER, urgent care clinic since your last visit? Hospitalized since your last visit? No    2. Have you seen or consulted any other health care providers outside of the [facility] since your last visit? Include any pap smears or colon screening.  No

## 2018-05-10 ENCOUNTER — DOCUMENTATION ONLY (OUTPATIENT)
Dept: ENDOCRINOLOGY | Age: 73
End: 2018-05-10

## 2018-08-28 ENCOUNTER — TELEPHONE (OUTPATIENT)
Dept: ENDOCRINOLOGY | Age: 73
End: 2018-08-28

## 2018-08-28 NOTE — TELEPHONE ENCOUNTER
Informed pt that a  thorough foot exam must be done in order to fill out paperwork for the foot center. Next follow up is scheduled for October. Will be done then.  She stated an understanding

## 2018-10-09 ENCOUNTER — HOSPITAL ENCOUNTER (OUTPATIENT)
Dept: LAB | Age: 73
Discharge: HOME | End: 2018-10-09
Payer: MEDICARE

## 2018-10-09 PROCEDURE — 80061 LIPID PANEL: CPT

## 2018-10-09 PROCEDURE — 82043 UR ALBUMIN QUANTITATIVE: CPT

## 2018-10-09 PROCEDURE — 80053 COMPREHEN METABOLIC PANEL: CPT

## 2018-10-09 PROCEDURE — 83036 HEMOGLOBIN GLYCOSYLATED A1C: CPT

## 2018-10-09 PROCEDURE — 36415 COLL VENOUS BLD VENIPUNCTURE: CPT

## 2018-10-16 ENCOUNTER — OFFICE VISIT (OUTPATIENT)
Dept: ENDOCRINOLOGY | Age: 73
End: 2018-10-16

## 2018-10-16 VITALS
TEMPERATURE: 97 F | OXYGEN SATURATION: 97 % | HEART RATE: 62 BPM | HEIGHT: 66 IN | WEIGHT: 227.7 LBS | DIASTOLIC BLOOD PRESSURE: 61 MMHG | SYSTOLIC BLOOD PRESSURE: 130 MMHG | RESPIRATION RATE: 18 BRPM | BODY MASS INDEX: 36.59 KG/M2

## 2018-10-16 DIAGNOSIS — E78.2 MIXED HYPERLIPIDEMIA: ICD-10-CM

## 2018-10-16 DIAGNOSIS — M81.0 SENILE OSTEOPOROSIS: ICD-10-CM

## 2018-10-16 DIAGNOSIS — E11.65 TYPE 2 DIABETES MELLITUS WITH HYPERGLYCEMIA, WITHOUT LONG-TERM CURRENT USE OF INSULIN (HCC): Primary | ICD-10-CM

## 2018-10-16 DIAGNOSIS — E11.65 TYPE 2 DIABETES MELLITUS WITH HYPERGLYCEMIA, WITHOUT LONG-TERM CURRENT USE OF INSULIN (HCC): ICD-10-CM

## 2018-10-16 DIAGNOSIS — I10 ESSENTIAL HYPERTENSION: ICD-10-CM

## 2018-10-16 RX ORDER — METFORMIN HYDROCHLORIDE 500 MG/1
1000 TABLET, EXTENDED RELEASE ORAL 2 TIMES DAILY WITH MEALS
Qty: 120 TAB | Refills: 6 | Status: SHIPPED | OUTPATIENT
Start: 2018-10-16 | End: 2019-07-10 | Stop reason: SDUPTHER

## 2018-10-16 RX ORDER — CEPHALEXIN 500 MG/1
CAPSULE ORAL
COMMUNITY
Start: 2018-10-09 | End: 2019-10-16 | Stop reason: ALTCHOICE

## 2018-10-16 NOTE — PROGRESS NOTES
HISTORY OF PRESENT ILLNESS  Kaylah Pacheco is a 68 y.o. female. 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 Referred : by self    H/o diabetes for 1 year     Current A1C is 7.1 % and symptoms/problems include none     Current diabetic medications include metformin. She never liked this medicaiton    Current monitoring regimen: home blood tests - daily  Home blood sugar records: trend: fluctuating a bit   Any episodes of hypoglycemia? no    Weight trend: stable  Prior visit with dietician: no  Current diet: \"unhealthy\" diet in general  Current exercise: no regular exercise, knee injury     Known diabetic complications: none  Cardiovascular risk factors: dyslipidemia, diabetes mellitus, obesity, hypertension, stress, post-menopausal    Eye exam current (within one year): yes  STANISLAW: no     Past Medical History:   Diagnosis Date    HTN (hypertension)     Muscle ache     Muscle pain     Stiff joint      Past Surgical History:   Procedure Laterality Date    HX CHOLECYSTECTOMY  1976    HX KNEE REPLACEMENT Right 2012     Current Outpatient Prescriptions   Medication Sig    cephALEXin (KEFLEX) 500 mg capsule     FEXOFENADINE HCL (ALLEGRA PO) Take  by mouth as needed.  metFORMIN ER (GLUCOPHAGE XR) 500 mg tablet Take 2 Tabs by mouth two (2) times daily (with meals).  spironolactone (ALDACTONE) 25 mg tablet Take 1 Tab by mouth two (2) times a day. Stop 50 mg dose    candesartan (ATACAND) 16 mg tablet Take 1 Tab by mouth daily. Stop atacand hctz    Lancets misc Test once daily.  Dx code E11.65  glucose blood VI test strips (FREESTYLE LITE STRIPS) strip Test once daily. Dx code E11.65    GLUCOSAMINE HCL/CHONDR RODRIGUES A NA (OSTEO BI-FLEX PO) Take  by mouth.  b complex vitamins tablet Take 1 Tab by mouth daily.  rosuvastatin (CRESTOR) 5 mg tablet Take 10 mg by mouth nightly.  aspirin delayed-release 81 mg tablet Take  by mouth daily.  calcium citrate-vitamin D3 (CITRACAL WITH VITAMIN D MAXIMUM) tablet Take  by mouth two (2) times a day.  ASCORBATE CALCIUM (COLLINS-C PO) Take  by mouth.  omega-3 fatty acids-vitamin e 1,000 mg cap Take 1 Cap by mouth.  TURMERIC ROOT EXTRACT PO Take  by mouth.  biotin 10,000 mcg cap Take  by mouth.  loratadine (CLARITIN) 10 mg tablet Take 10 mg by mouth.  PSYLLIUM SEED, WITH DEXTROSE, (FIBER PO) Take  by mouth.  LACTOBACILLUS ACIDOPHILUS (PROBIOTIC PO) Take  by mouth.  MULTIVITAMIN WITH MINERALS (HAIR,SKIN AND NAILS PO) Take  by mouth. No current facility-administered medications for this visit. Review of Systems   Constitutional: Negative. HENT: Negative. Eyes: Negative for pain and redness. Respiratory: Negative. Cardiovascular: Negative for chest pain, palpitations and leg swelling. Gastrointestinal: Negative. Negative for constipation. Genitourinary: Negative. Musculoskeletal: Negative for myalgias. Skin: Negative. Neurological: Negative. Endo/Heme/Allergies: Negative. Psychiatric/Behavioral: Negative for depression and memory loss. The patient does not have insomnia. Physical Exam   Constitutional: She is oriented to person, place, and time. She appears well-developed and well-nourished. HENT:   Head: Normocephalic. Eyes: Conjunctivae and EOM are normal. Pupils are equal, round, and reactive to light. Neck: Normal range of motion. Neck supple. No JVD present. No tracheal deviation present. No thyromegaly present.    Cardiovascular: Normal rate, regular rhythm and normal heart sounds. No murmur heard. Pulmonary/Chest: Breath sounds normal.   Abdominal: Soft. Bowel sounds are normal.   Musculoskeletal: Normal range of motion. Lymphadenopathy:     She has no cervical adenopathy. Neurological: She is alert and oriented to person, place, and time. She has normal reflexes. Skin: Skin is warm. Psychiatric: She has a normal mood and affect. Diabetic foot exam: March 2018    Left: Reflexes nd     Vibratory sensation normal    Proprioception nd   Sharp/dull discrimination nd    Filament test normal sensation with micro filament   Pulse DP: 2+ (normal)   Pulse PT: nd   Deformities: None  Right: Reflexes nd   Vibratory sensation normal   Proprioception nd   Sharp/dull discrimination nd   Filament test normal sensation with micro filament   Pulse DP: 2+ (normal)   Pulse PT: nd   Deformities: None        Diabetic feet exam  Oct 2018            H/o partial or complete amputation of foot : n  H/o previous foot ulceration : n  H/o pre - ulcerative callus : yes  H/o peripheral neuropathy and callus : yes  H/o poor circulation     STANISLAW   : n  Foot deformity : left foot has second toe as hammer toe             Lab Results   Component Value Date/Time    Hemoglobin A1c 6.2 (H) 10/09/2018 09:32 AM    Hemoglobin A1c 6.7 (H) 02/28/2018 08:48 AM    Hemoglobin A1c 6.7 (H) 06/07/2017 09:18 AM    Glucose 116 (H) 10/09/2018 09:32 AM    Microalb/Creat ratio (ug/mg creat.) 10.9 10/09/2018 09:32 AM    LDL, calculated 63 10/09/2018 09:32 AM    Creatinine 0.74 10/09/2018 09:32 AM      Lab Results   Component Value Date/Time    Cholesterol, total 142 10/09/2018 09:32 AM    HDL Cholesterol 43 10/09/2018 09:32 AM    LDL, calculated 63 10/09/2018 09:32 AM    Triglyceride 179 (H) 10/09/2018 09:32 AM       Lab Results   Component Value Date/Time    ALT (SGPT) 11 10/09/2018 09:32 AM    AST (SGOT) 19 10/09/2018 09:32 AM    Alk.  phosphatase 46 10/09/2018 09:32 AM    Bilirubin, total 1.0 10/09/2018 09:32 AM       Lab Results   Component Value Date/Time    GFR est AA 94 10/09/2018 09:32 AM    GFR est non-AA 81 10/09/2018 09:32 AM    Creatinine 0.74 10/09/2018 09:32 AM    BUN 14 10/09/2018 09:32 AM    Sodium 142 10/09/2018 09:32 AM    Potassium 4.5 10/09/2018 09:32 AM    Chloride 101 10/09/2018 09:32 AM    CO2 23 10/09/2018 09:32 AM      ASSESSMENT and PLAN    1. Type 2 DM uncontrolled : a1c is  6.7 %    From  Feb 2018     Compared to   6.7 %      From   June 2017     Compared to   7.6 %    From  March 2017    Compared to 7 .4%  From dec 2016  Compared to  7%     From Aug  2016  Compared   To 7.1 %   From march 2016     Discussed patho-physiology of DM 2 again , emphasized on TLC  She generally dislikes being on any kind of  prescibed meds, but ironically she takes several vitamin supplements    ASKED her to be more natural , and reminded her of eating home prepared meals     Tolerating the metformin interestingly and  Is no longer on glipizide    TLC emphasized   Discussed inj incretins but she rejected them     Patient is advised to check blood sugars 1-2 times daily by rotation method. reviewed medications and side effects in detail  lab results and schedule of future lab studies reviewed with patient        2. Hypoglycemia :  Educated on treating the hypoglycemia. 3. HTN : continue aldactone  25 mg bid  and atacand . She was stopped from HCTZ for mild renal failure, despite which she is doing good   Patient is educated about importance of compliance with anti-hypertensives especially ARB/ACEI    4. Dyslipidemia :  High TG , unable to  her LDL   Dr. rGegor Mazariegos increased crestor to 10 ,   Labs in march 2018 did nto have chl    continue crestor . Patient is educated about benefits and adverse effects of statins and explained how benefits outweigh risk. 5. use of aspirin to prevent MI and TIA's discussed        6.   Low BMD  :  Date : March  10  th   2016   Bone DEXA  ap spine T-score 0.3; left femoral Neck T- score -1.5, right femoral neck T-score-1.3  Date : march 2014   Bone DEXA  ap spine T-score 0.0; left femoral Neck T- score -2.0, right femoral neck T-score -0.5      March 2018 due , ORDERED     No prior h/o fractures   Discussed findings, reassured her that there is no suggestion of osteoporosis  She could benefit from Prolia use   Will do weekly fosamax first   She needs to be on calcium and vit d supplementation      7. Obesity : Body mass index is 36.75 kg/(m^2). She has  improved her diet significantly and she had good weight loss   Commended her           8. Mild CKD - resolved   significant decline noted in short period of time   I stopped hctz, but pt figured out that she mistakenly took motrin high doses           She is c/o fibromyalgia pains and is asking me if she could take aleve.  Asked her to f/u with pcp for neurontin for fibromyalgia  She  got  duloxetine 20 mg a day       Dr. Sahu Kidney has gotten her the shoes already - BY MY oct visit 2018         Did the comprehensive foot exam today   I am treating the patient [facility] comprehensive plan of care for diabetes   The patient would benefit from diabetic foot wear to protect their feet       F/u in 12 months     > 50 % of time is spent on counseling   Patient voiced understanding her plan of care

## 2018-10-16 NOTE — PROGRESS NOTES
1. Have you been to the ER, urgent care clinic since your last visit? Hospitalized since your last visit? No    2. Have you seen or consulted any other health care providers outside of the [facility] since your last visit? Include any pap smears or colon screening. No     Wt Readings from Last 3 Encounters:   10/16/18 227 lb 11.2 oz (103.3 kg)   04/18/18 233 lb 9.6 oz (106 kg)   03/07/18 233 lb 4.8 oz (105.8 kg)     Temp Readings from Last 3 Encounters:   10/16/18 97 °F (36.1 °C) (Oral)   04/18/18 97.1 °F (36.2 °C) (Oral)   03/07/18 96.9 °F (36.1 °C) (Oral)     BP Readings from Last 3 Encounters:   10/16/18 130/61   04/18/18 137/66   03/07/18 124/57     Pulse Readings from Last 3 Encounters:   10/16/18 62   04/18/18 68   03/07/18 77     Lab Results   Component Value Date/Time    Hemoglobin A1c 6.2 (H) 10/09/2018 09:32 AM     No meter or logbook today.

## 2018-10-16 NOTE — MR AVS SNAPSHOT
[address] [address] [address] [facility] [address] 
[phone] Patient: Sonny Weston MRN: [id number] OYQ:[id number] Visit Information Date & Time Provider Department Dept. Phone Encounter #  
 10/16/2018  9:45 AM Aggie Barrett MD [facility] [phone] [id number] Follow-up Instructions Return in about 1 year (around 10/16/2019). Upcoming Health Maintenance Date Due Hepatitis C Screening 1945 DTaP/Tdap/Td series (1 - Tdap) 5/20/1967 Shingrix Vaccine Age 50> (1 of 2) 5/20/1996 BREAST CANCER SCRN MAMMOGRAM 5/20/1996 FOBT Q 1 YEAR AGE 50-75 5/20/1996 Pneumococcal 65+ High/Highest Risk (1 of 2 - PCV13) 5/20/2011 MEDICARE YEARLY EXAM 3/14/2018 Influenza Age 5 to Adult 8/1/2018 EYE EXAM RETINAL OR DILATED Q1 2/16/2019 HEMOGLOBIN A1C Q6M 4/9/2019 FOOT EXAM Q1 4/23/2019 MICROALBUMIN Q1 10/9/2019 LIPID PANEL Q1 10/9/2019 GLAUCOMA SCREENING Q2Y 2/16/2020 Allergies as of 10/16/2018  Review Complete On: 10/16/2018 By: Aggie Barrett MD  
 No Known Allergies Current Immunizations  Never Reviewed No immunizations on file. Not reviewed this visit You Were Diagnosed With   
  
 Codes Comments Type 2 diabetes mellitus with hyperglycemia, without long-term current use of insulin (HCC)    -  Primary ICD-10-CM: E11.65 ICD-9-CM: 250.00, 790.29 Senile osteoporosis     ICD-10-CM: M81.0 ICD-9-CM: 733.01 Essential hypertension     ICD-10-CM: I10 
ICD-9-CM: 401.9 Mixed hyperlipidemia     ICD-10-CM: E78.2 ICD-9-CM: 272.2 BMI 36.0-36.9,adult     ICD-10-CM: E40.55 
ICD-9-CM: V85.36 Vitals BP Pulse Temp Resp Height(growth percentile) Weight(growth percentile) 130/61 (BP 1 Location: Right arm, BP Patient Position: Sitting) 62 97 °F (36.1 °C) (Oral) 18 5' 6\" (1.676 m) 227 lb 11.2 oz (103.3 kg) SpO2 BMI OB Status Smoking Status 97% 36.75 kg/m2 Postmenopausal Never Smoker BMI and BSA Data Body Mass Index Body Surface Area 36.75 kg/m 2 2.19 m 2 Preferred Pharmacy Pharmacy Name Phone [facility] [address] [phone] Your Updated Medication List  
  
   
This list is accurate as of 10/16/18 10:32 AM.  Always use your most recent med list. ALLEGRA PO Take  by mouth as needed. aspirin delayed-release 81 mg tablet Take  by mouth daily. b complex vitamins tablet Take 1 Tab by mouth daily. biotin 10,000 mcg Cap Take  by mouth.  
  
 calcium citrate-vitamin D3 tablet Commonly known as:  CITRACAL WITH VITAMIN D MAXIMUM Take  by mouth two (2) times a day. candesartan 16 mg tablet Commonly known as:  ATACAND Take 1 Tab by mouth daily. Stop atacand hctz  
  
 cephALEXin 500 mg capsule Commonly known as:  KEFLEX  
  
 COLLINS-C PO Take  by mouth. FIBER PO Take  by mouth. glucose blood VI test strips strip Commonly known as:  FREESTYLE LITE STRIPS Test once daily. Dx code E11.65 HAIR,SKIN AND NAILS PO Take  by mouth. Lancets Misc Test once daily. Dx code E11.65  
  
 loratadine 10 mg tablet Commonly known as:  [place] Take 10 mg by mouth.  
  
 metFORMIN  mg tablet Commonly known as:  GLUCOPHAGE XR Take 2 Tabs by mouth two (2) times daily (with meals). omega-3 fatty acids-vitamin e 1,000 mg Cap Take 1 Cap by mouth. OSTEO BI-FLEX PO Take  by mouth. PROBIOTIC PO Take  by mouth. rosuvastatin 5 mg tablet Commonly known as:  CRESTOR Take 10 mg by mouth nightly. spironolactone 25 mg tablet Commonly known as:  ALDACTONE Take 1 Tab by mouth two (2) times a day. Stop 50 mg dose TURMERIC ROOT EXTRACT PO Take  by mouth. Follow-up Instructions Return in about 1 year (around 10/16/2019). To-Do List   
 10/16/2018 Imaging:  DEXA BONE DENSITY STUDY AXIAL Patient Instructions   
-------------------------------------------------------------------------------------------- Refills    -    please call your pharmacy and have them send us a refill request 
 
Results  -  allow up to a week for lab results to be processed and reviewed. Phone calls  -  Allow upto 24 hrs. for non-urgent calls to be retained Prior authorization - It may take up to 4 weeks to process, depending on your insurance Forms  -  FMLA, DMV, patient assistance, etc. will take up to 2 weeks to process Cancellations - please notify the office in advance if you cannot keep your appointment Samples  - will only be dispensed at visits as supply is limited If you are having a medical emergency call 911 
 
-------------------------------------------------------------------------------------------- Introducing [facility]! Sunni Cosme introduces [employer] patient portal. Now you can access parts of your medical record, email your doctor's office, and request medication refills online. 1. In your internet browser, go to [email or web address] 2. Click on the First Time User? Click Here link in the Sign In box. You will see the New Member Sign Up page. 3. Enter your On[employer] Access Code exactly as it appears below. You will not need to use this code after youve completed the sign-up process. If you do not sign up before the expiration date, you must request a new code. · On[employer] Access Code: [email or web address] Expires: 1/14/2019 10:30 AM 
 
4. Enter the last four digits of your Social Security Number (xxxx) and Date of Birth (mm/dd/yyyy) as indicated and click Submit. You will be taken to the next sign-up page. 5. Create a MyChart ID. This will be your [employer] login ID and cannot be changed, so think of one that is secure and easy to remember. 6. Create a [employer] password. You can change your password at any time. 7. Enter your Password Reset Question and Answer. This can be used at a later time if you forget your password. 8. Enter your e-mail address. You will receive e-mail notification when new information is available in [address] 9. Click Sign Up. You can now view and download portions of your medical record. 10. Click the Download Summary menu link to download a portable copy of your medical information. If you have questions, please visit the Frequently Asked Questions section of the [employer] website. Remember, [employer] is NOT to be used for urgent needs. For medical emergencies, dial 911. Now available from your iPhone and Android! Please provide this summary of care documentation to your next provider. Your primary care clinician is listed as Desirae Moss. If you have any questions after today's visit, please call [phone].

## 2018-10-16 NOTE — PATIENT INSTRUCTIONS
--------------------------------------------------------------------------------------------    Refills    -    please call your pharmacy and have them send us a refill request    Results  -  allow up to a week for lab results to be processed and reviewed. Phone calls  -  Allow upto 24 hrs.  for non-urgent calls to be retained    Prior authorization - It may take up to 4 weeks to process, depending on your insurance    Forms  -  FMLA, DMV, patient assistance, etc. will take up to 2 weeks to process    Cancellations - please notify the office in advance if you cannot keep your appointment    Samples  - will only be dispensed at visits as supply is limited      If you are having a medical emergency call 911    --------------------------------------------------------------------------------------------         metformin ER to 1000 mg  Twice a day with meals       Stay on  atacand 16 mg a day           --------------------------------------------------------------------------------------------------------------------------     spironolactone 25 mg   twice a day     ------------------------------------------------------------------------------------------------------------------    Do not skip meals  Do not eat in between meals    Reduce carbs- pasta, rice, potatoes, bread   Do not drink juices or sodas  Donot eat peanut butter     Do not eat sugar free cookies and cakes   Do not eat peaches, grapes, oranges, pineapples     ------------------------------------------------------------------------------------------------------------------

## 2018-10-17 ENCOUNTER — TELEPHONE (OUTPATIENT)
Dept: ENDOCRINOLOGY | Age: 73
End: 2018-10-17

## 2018-10-17 NOTE — TELEPHONE ENCOUNTER
Patient is requesting a call back with the name of the GYN/ Neurologist that she discussed with Dr. Erendira Madison at her previous office visit.  Please advise [phone]

## 2018-10-19 ENCOUNTER — HOSPITAL ENCOUNTER (OUTPATIENT)
Dept: MAMMOGRAPHY | Age: 73
Discharge: HOME | End: 2018-10-19
Attending: INTERNAL MEDICINE
Payer: MEDICARE

## 2018-10-19 DIAGNOSIS — I10 ESSENTIAL HYPERTENSION: ICD-10-CM

## 2018-10-19 DIAGNOSIS — E11.65 TYPE 2 DIABETES MELLITUS WITH HYPERGLYCEMIA, WITHOUT LONG-TERM CURRENT USE OF INSULIN (HCC): ICD-10-CM

## 2018-10-19 DIAGNOSIS — M81.0 SENILE OSTEOPOROSIS: ICD-10-CM

## 2018-10-19 DIAGNOSIS — E78.2 MIXED HYPERLIPIDEMIA: ICD-10-CM

## 2018-10-19 PROCEDURE — 77080 DXA BONE DENSITY AXIAL: CPT

## 2019-07-10 RX ORDER — METFORMIN HYDROCHLORIDE 500 MG/1
1000 TABLET, EXTENDED RELEASE ORAL 2 TIMES DAILY WITH MEALS
Qty: 120 TAB | Refills: 6 | Status: SHIPPED | OUTPATIENT
Start: 2019-07-10 | End: 2019-10-16 | Stop reason: SDUPTHER

## 2019-09-24 DIAGNOSIS — E11.65 TYPE 2 DIABETES MELLITUS WITH HYPERGLYCEMIA, WITHOUT LONG-TERM CURRENT USE OF INSULIN (HCC): ICD-10-CM

## 2019-10-09 ENCOUNTER — HOSPITAL ENCOUNTER (OUTPATIENT)
Dept: LAB | Age: 74
Discharge: HOME | End: 2019-10-09
Payer: MEDICARE

## 2019-10-09 DIAGNOSIS — M81.0 SENILE OSTEOPOROSIS: ICD-10-CM

## 2019-10-09 DIAGNOSIS — E78.2 MIXED HYPERLIPIDEMIA: ICD-10-CM

## 2019-10-09 DIAGNOSIS — I10 ESSENTIAL HYPERTENSION: ICD-10-CM

## 2019-10-09 DIAGNOSIS — E11.65 TYPE 2 DIABETES MELLITUS WITH HYPERGLYCEMIA, WITHOUT LONG-TERM CURRENT USE OF INSULIN (HCC): ICD-10-CM

## 2019-10-09 PROCEDURE — 83036 HEMOGLOBIN GLYCOSYLATED A1C: CPT

## 2019-10-09 PROCEDURE — 36415 COLL VENOUS BLD VENIPUNCTURE: CPT

## 2019-10-09 PROCEDURE — 80061 LIPID PANEL: CPT

## 2019-10-09 PROCEDURE — 80053 COMPREHEN METABOLIC PANEL: CPT

## 2019-10-09 PROCEDURE — 82043 UR ALBUMIN QUANTITATIVE: CPT

## 2019-10-10 LAB
ALBUMIN SERPL-MCNC: 4.4 G/DL (ref 3.5–4.8)
ALBUMIN/CREAT UR: 4.1 MG/G CREAT (ref 0–30)
ALBUMIN/GLOB SERPL: 1.9 {RATIO} (ref 1.2–2.2)
ALP SERPL-CCNC: 52 IU/L (ref 39–117)
ALT SERPL-CCNC: 21 IU/L (ref 0–32)
AST SERPL-CCNC: 29 IU/L (ref 0–40)
BILIRUB SERPL-MCNC: 1.4 MG/DL (ref 0–1.2)
BUN SERPL-MCNC: 14 MG/DL (ref 8–27)
BUN/CREAT SERPL: 18 (ref 12–28)
CALCIUM SERPL-MCNC: 10 MG/DL (ref 8.7–10.3)
CHLORIDE SERPL-SCNC: 99 MMOL/L (ref 96–106)
CHOLEST SERPL-MCNC: 158 MG/DL (ref 100–199)
CO2 SERPL-SCNC: 23 MMOL/L (ref 20–29)
CREAT SERPL-MCNC: 0.79 MG/DL (ref 0.57–1)
CREAT UR-MCNC: 159.4 MG/DL
EST. AVERAGE GLUCOSE BLD GHB EST-MCNC: 148 MG/DL
GLOBULIN SER CALC-MCNC: 2.3 G/DL (ref 1.5–4.5)
GLUCOSE SERPL-MCNC: 130 MG/DL (ref 65–99)
HBA1C MFR BLD: 6.8 % (ref 4.8–5.6)
HDLC SERPL-MCNC: 44 MG/DL
INTERPRETATION, 910389: NORMAL
LDLC SERPL CALC-MCNC: 65 MG/DL (ref 0–99)
Lab: NORMAL
MICROALBUMIN UR-MCNC: 6.6 UG/ML
POTASSIUM SERPL-SCNC: 4.5 MMOL/L (ref 3.5–5.2)
PROT SERPL-MCNC: 6.7 G/DL (ref 6–8.5)
SODIUM SERPL-SCNC: 141 MMOL/L (ref 134–144)
TRIGL SERPL-MCNC: 243 MG/DL (ref 0–149)
VLDLC SERPL CALC-MCNC: 49 MG/DL (ref 5–40)

## 2019-10-16 ENCOUNTER — OFFICE VISIT (OUTPATIENT)
Dept: ENDOCRINOLOGY | Age: 74
End: 2019-10-16

## 2019-10-16 VITALS
WEIGHT: 229.3 LBS | RESPIRATION RATE: 18 BRPM | TEMPERATURE: 97.3 F | DIASTOLIC BLOOD PRESSURE: 61 MMHG | HEIGHT: 66 IN | HEART RATE: 84 BPM | SYSTOLIC BLOOD PRESSURE: 121 MMHG | BODY MASS INDEX: 36.85 KG/M2 | OXYGEN SATURATION: 96 %

## 2019-10-16 DIAGNOSIS — E11.65 TYPE 2 DIABETES MELLITUS WITH HYPERGLYCEMIA, WITHOUT LONG-TERM CURRENT USE OF INSULIN (HCC): ICD-10-CM

## 2019-10-16 DIAGNOSIS — E66.01 CLASS 2 SEVERE OBESITY DUE TO EXCESS CALORIES WITH SERIOUS COMORBIDITY AND BODY MASS INDEX (BMI) OF 37.0 TO 37.9 IN ADULT (HCC): ICD-10-CM

## 2019-10-16 DIAGNOSIS — E66.01 SEVERE OBESITY (HCC): ICD-10-CM

## 2019-10-16 DIAGNOSIS — I10 ESSENTIAL HYPERTENSION: ICD-10-CM

## 2019-10-16 DIAGNOSIS — E11.65 TYPE 2 DIABETES MELLITUS WITH HYPERGLYCEMIA, WITHOUT LONG-TERM CURRENT USE OF INSULIN (HCC): Primary | ICD-10-CM

## 2019-10-16 DIAGNOSIS — M81.0 SENILE OSTEOPOROSIS: ICD-10-CM

## 2019-10-16 RX ORDER — METFORMIN HYDROCHLORIDE 500 MG/1
1000 TABLET, EXTENDED RELEASE ORAL 2 TIMES DAILY WITH MEALS
Qty: 360 TAB | Refills: 4 | Status: SHIPPED | OUTPATIENT
Start: 2019-10-16 | End: 2020-10-14 | Stop reason: SDUPTHER

## 2019-10-16 NOTE — LETTER
10/16/19 Patient: John Falcon YOB: 1945 Date of Visit: 10/16/2019 Giorgio Kam MD 
[address] [facility] [address] VIA Facsimile: [phone] Dear Giorgio Kam MD, Thank you for referring Ms. John Falcon to [address] [place] [address] for evaluation. My notes for this consultation are attached. If you have questions, please do not hesitate to call me. I look forward to following your patient along with you. Sincerely, Liz Mcpherson MD

## 2019-10-16 NOTE — PATIENT INSTRUCTIONS
--------------------------------------------------------------------------------------------    Refills    -    please call your pharmacy and have them send us a refill request    Results  -  allow up to a week for lab results to be processed and reviewed. Phone calls  -  Allow upto 24 hrs.  for non-urgent calls to be retained    Prior authorization - It may take up to 4 weeks to process, depending on your insurance    Forms  -  FMLA, DMV, patient assistance, etc. will take up to 2 weeks to process    Cancellations - please notify the office in advance if you cannot keep your appointment    Samples  - will only be dispensed at visits as supply is limited      If you are having a medical emergency call 911    --------------------------------------------------------------------------------------------         metformin ER to 1000 mg  Twice a day with meals       ---------------------------------------------------------------------------------------------------------------------      Do not skip meals  Do not eat in between meals    Reduce carbs- pasta, rice, potatoes, bread   Do not drink juices or sodas  Donot eat peanut butter     Do not eat sugar free cookies and cakes   Do not eat peaches, grapes, oranges, pineapples     ------------------------------------------------------------------------------------------------------------------

## 2019-10-16 NOTE — PROGRESS NOTES
HISTORY OF PRESENT ILLNESS  Chris Cárdenas is a 69 y.o. female. 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 She never liked this medicaiton    Current monitoring regimen: home blood tests - daily  Home blood sugar records: trend: fluctuating a bit   Any episodes of hypoglycemia? no    Weight trend: stable  Prior visit with dietician: no  Current diet: \"unhealthy\" diet in general  Current exercise: no regular exercise, knee injury     Known diabetic complications: none  Cardiovascular risk factors: dyslipidemia, diabetes mellitus, obesity, hypertension, stress, post-menopausal    Eye exam current (within one year): yes  STANISLAW: no     Past Medical History:   Diagnosis Date    HTN (hypertension)     Muscle ache     Muscle pain     Stiff joint      Past Surgical History:   Procedure Laterality Date    HX CHOLECYSTECTOMY  1976    HX KNEE REPLACEMENT Right 2012     Current Outpatient Medications   Medication Sig    glucose blood VI test strips (FREESTYLE LITE STRIPS) strip Test once daily. Dx code E11.65    metFORMIN ER (GLUCOPHAGE XR) 500 mg tablet Take 2 Tabs by mouth two (2) times daily (with meals).  FEXOFENADINE HCL (ALLEGRA PO) Take  by mouth as needed.  spironolactone (ALDACTONE) 25 mg tablet Take 1 Tab by mouth two (2) times a day.  Stop 50 mg dose  candesartan (ATACAND) 16 mg tablet Take 1 Tab by mouth daily. Stop atacand hctz    Lancets misc Test once daily. Dx code E11.65    GLUCOSAMINE HCL/CHONDR RODRIGUES A NA (OSTEO BI-FLEX PO) Take  by mouth.  b complex vitamins tablet Take 1 Tab by mouth daily.  rosuvastatin (CRESTOR) 5 mg tablet Take 10 mg by mouth nightly.  aspirin delayed-release 81 mg tablet Take  by mouth daily.  calcium citrate-vitamin D3 (CITRACAL WITH VITAMIN D MAXIMUM) tablet Take  by mouth two (2) times a day.  ASCORBATE CALCIUM (COLLINS-C PO) Take  by mouth.  omega-3 fatty acids-vitamin e 1,000 mg cap Take 1 Cap by mouth.  TURMERIC ROOT EXTRACT PO Take  by mouth.  cephALEXin (KEFLEX) 500 mg capsule     biotin 10,000 mcg cap Take  by mouth.  loratadine (CLARITIN) 10 mg tablet Take 10 mg by mouth.  PSYLLIUM SEED, WITH DEXTROSE, (FIBER PO) Take  by mouth.  LACTOBACILLUS ACIDOPHILUS (PROBIOTIC PO) Take  by mouth.  MULTIVITAMIN WITH MINERALS (HAIR,SKIN AND NAILS PO) Take  by mouth. No current facility-administered medications for this visit. Review of Systems   Constitutional: Negative. HENT: Negative. Eyes: Negative for pain and redness. Respiratory: Negative. Cardiovascular: Negative for chest pain, palpitations and leg swelling. Gastrointestinal: Negative. Negative for constipation. Genitourinary: Negative. Musculoskeletal: Negative for myalgias. Skin: Negative. Neurological: Negative. Endo/Heme/Allergies: Negative. Psychiatric/Behavioral: Negative for depression and memory loss. The patient does not have insomnia. Physical Exam   Constitutional: She is oriented to person, place, and time. She appears well-developed and well-nourished. HENT:   Head: Normocephalic. Eyes: Conjunctivae and EOM are normal. Pupils are equal, round, and reactive to light. Neck: Normal range of motion. Neck supple. No JVD present. No tracheal deviation present.  No thyromegaly present. Cardiovascular: Normal rate, regular rhythm and normal heart sounds. No murmur heard. Pulmonary/Chest: Breath sounds normal.   Abdominal: Soft. Bowel sounds are normal.   Musculoskeletal: Normal range of motion. Lymphadenopathy:     She has no cervical adenopathy. Neurological: She is alert and oriented to person, place, and time. She has normal reflexes. Skin: Skin is warm. Psychiatric: She has a normal mood and affect.      Diabetic foot exam: March 2018    Left: Reflexes nd     Vibratory sensation normal    Proprioception nd   Sharp/dull discrimination nd    Filament test normal sensation with micro filament   Pulse DP: 2+ (normal)   Pulse PT: nd   Deformities: hammer toes, 2 and 3   Right: Reflexes nd   Vibratory sensation normal   Proprioception nd   Sharp/dull discrimination nd   Filament test normal sensation with micro filament   Pulse DP: 2+ (normal)   Pulse PT: nd   Deformities: hammer toe SECOND TOE         Diabetic feet exam  Oct 2019      H/o partial or complete amputation of foot : n  H/o previous foot ulceration : n  H/o pre - ulcerative callus : yes  H/o peripheral neuropathy and callus : yes  H/o poor circulation     STANISLAW   : n  Foot deformity : left foot has second toe , THIRD TOES,  as hammer toe RIGHT FOOT - ONLY SECOND TOE IS HAMMER TOE               Lab Results   Component Value Date/Time    Hemoglobin A1c 6.8 (H) 10/09/2019 09:06 AM    Hemoglobin A1c 6.2 (H) 10/09/2018 09:32 AM    Hemoglobin A1c 6.7 (H) 02/28/2018 08:48 AM    Glucose 130 (H) 10/09/2019 09:06 AM    Microalb/Creat ratio (ug/mg creat.) 4.1 10/09/2019 09:06 AM    LDL, calculated 65 10/09/2019 09:06 AM    Creatinine 0.79 10/09/2019 09:06 AM      Lab Results   Component Value Date/Time    Cholesterol, total 158 10/09/2019 09:06 AM    HDL Cholesterol 44 10/09/2019 09:06 AM    LDL, calculated 65 10/09/2019 09:06 AM    Triglyceride 243 (H) 10/09/2019 09:06 AM       Lab Results   Component Value Date/Time    ALT (SGPT) 21 10/09/2019 09:06 AM    AST (SGOT) 29 10/09/2019 09:06 AM    Alk. phosphatase 52 10/09/2019 09:06 AM    Bilirubin, total 1.4 (H) 10/09/2019 09:06 AM       Lab Results   Component Value Date/Time    GFR est AA 86 10/09/2019 09:06 AM    GFR est non-AA 74 10/09/2019 09:06 AM    Creatinine 0.79 10/09/2019 09:06 AM    BUN 14 10/09/2019 09:06 AM    Sodium 141 10/09/2019 09:06 AM    Potassium 4.5 10/09/2019 09:06 AM    Chloride 99 10/09/2019 09:06 AM    CO2 23 10/09/2019 09:06 AM      ASSESSMENT and PLAN    1. Type 2 DM uncontrolled : a1c is   6.8 %     FROM OCT 2019   COMPARED TO    6.7 %    From  Feb 2018     Compared to   6.7 %      From   June 2017     Compared to   7.6 %    From  March 2017    Compared to 7 .4%  From dec 2016  Compared to  7%     From Aug  2016  Compared   To 7.1 %   From march 2016      emphasized on TLC  She generally dislikes being on any kind of  prescibed meds, but ironically she takes several vitamin supplements    ASKED her to be more natural , and reminded her of eating home prepared meals     Tolerating the metformin interestingly   TLC emphasized   Discussed inj incretins but she rejected them     Patient is advised to check blood sugars 1-2 times daily by rotation method. reviewed medications and side effects in detail  lab results and schedule of future lab studies reviewed with patient        2. Hypoglycemia :  Educated on treating the hypoglycemia. 3. HTN : continue aldactone  25 mg bid  and atacand . She was stopped from HCTZ for mild renal failure, despite which she is doing good   Patient is educated about importance of compliance with anti-hypertensives especially ARB/ACEI  o  her LDL   4. Dyslipidemia :  High TG ,   Dr. Zaina Rose increased crestor to 10 ,      continue crestor . Patient is educated about benefits and adverse effects of statins and explained how benefits outweigh risk. 5. use of aspirin to prevent MI and TIA's discussed        6. Low BMD  :  Date : March  10  th   2016   Bone DEXA  ap spine T-score 0.3; left femoral Neck T- score  -1.5, right femoral neck T-score-1.3  Date : march 2014   Bone DEXA  ap spine T-score 0.0; left femoral Neck T- score -2.0, right femoral neck T-score -0.5      March 2018 due , ORDERED     No prior h/o fractures   Discussed findings, reassured her that there is no suggestion of osteoporosis  She could benefit from Prolia use   Will do weekly fosamax first   She needs to be on calcium and vit d supplementation      7. Obesity : Body mass index is 37.01 kg/m². She has   TO  diet significantly  Commended her            8. Mild CKD - resolved   significant decline noted in short period of time   I stopped hctz, but pt figured out that she mistakenly took motrin high doses       9. FIBROMYALGIA- ON CYMBALTA FROM PCP       10.   NEW DIAGNOSIS OF gILBERT SYNDROME         Did the comprehensive foot exam today   I am treating the patient [facility] comprehensive plan of care for diabetes   The patient would benefit from diabetic foot wear to protect their feet       F/u in 12 months     > 50 % of time is spent on counseling   Patient voiced understanding her plan of care

## 2019-10-16 NOTE — PROGRESS NOTES
1. Have you been to the ER, urgent care clinic since your last visit? No  Hospitalized since your last visit? No    2. Have you seen or consulted any other health care providers outside of the [address] [place] [address] since your last visit? Include any pap smears or colon screening. No    Wt Readings from Last 3 Encounters:   10/16/19 229 lb 4.8 oz (104 kg)   10/16/18 227 lb 11.2 oz (103.3 kg)   04/18/18 233 lb 9.6 oz (106 kg)     Temp Readings from Last 3 Encounters:   10/16/19 97.3 °F (36.3 °C) (Oral)   10/16/18 97 °F (36.1 °C) (Oral)   04/18/18 97.1 °F (36.2 °C) (Oral)     BP Readings from Last 3 Encounters:   10/16/19 121/61   10/16/18 130/61   04/18/18 137/66     Pulse Readings from Last 3 Encounters:   10/16/19 84   10/16/18 62   04/18/18 68     Lab Results   Component Value Date/Time    Hemoglobin A1c 6.8 (H) 10/09/2019 09:06 AM     Patient does not have meter today.

## 2020-04-20 ENCOUNTER — TELEPHONE (OUTPATIENT)
Dept: ENDOCRINOLOGY | Age: 75
End: 2020-04-20

## 2020-04-20 DIAGNOSIS — E11.65 TYPE 2 DIABETES MELLITUS WITH HYPERGLYCEMIA, WITHOUT LONG-TERM CURRENT USE OF INSULIN (HCC): ICD-10-CM

## 2020-04-20 RX ORDER — LANCETS
EACH MISCELLANEOUS
Qty: 100 EACH | Refills: 4 | Status: SHIPPED | OUTPATIENT
Start: 2020-04-20 | End: 2020-10-22 | Stop reason: SDUPTHER

## 2020-04-20 NOTE — TELEPHONE ENCOUNTER
Patient states she needs refill on  Freestyle Lite test strips and lancets sent to [facility] 90 day supply.

## 2020-10-14 ENCOUNTER — OFFICE VISIT (OUTPATIENT)
Dept: ENDOCRINOLOGY | Age: 75
End: 2020-10-14
Payer: MEDICARE

## 2020-10-14 VITALS
HEART RATE: 88 BPM | SYSTOLIC BLOOD PRESSURE: 145 MMHG | RESPIRATION RATE: 18 BRPM | OXYGEN SATURATION: 92 % | TEMPERATURE: 97.7 F | HEIGHT: 66 IN | WEIGHT: 237.6 LBS | BODY MASS INDEX: 38.18 KG/M2 | DIASTOLIC BLOOD PRESSURE: 76 MMHG

## 2020-10-14 DIAGNOSIS — M81.0 SENILE OSTEOPOROSIS: ICD-10-CM

## 2020-10-14 DIAGNOSIS — E11.65 TYPE 2 DIABETES MELLITUS WITH HYPERGLYCEMIA, WITHOUT LONG-TERM CURRENT USE OF INSULIN (HCC): Primary | ICD-10-CM

## 2020-10-14 DIAGNOSIS — E66.01 SEVERE OBESITY (HCC): ICD-10-CM

## 2020-10-14 DIAGNOSIS — I10 ESSENTIAL HYPERTENSION: ICD-10-CM

## 2020-10-14 DIAGNOSIS — E78.2 MIXED HYPERLIPIDEMIA: ICD-10-CM

## 2020-10-14 PROCEDURE — 99214 OFFICE O/P EST MOD 30 MIN: CPT | Performed by: INTERNAL MEDICINE

## 2020-10-14 PROCEDURE — G8510 SCR DEP NEG, NO PLAN REQD: HCPCS | Performed by: INTERNAL MEDICINE

## 2020-10-14 PROCEDURE — G8754 DIAS BP LESS 90: HCPCS | Performed by: INTERNAL MEDICINE

## 2020-10-14 PROCEDURE — G8536 NO DOC ELDER MAL SCRN: HCPCS | Performed by: INTERNAL MEDICINE

## 2020-10-14 PROCEDURE — G8427 DOCREV CUR MEDS BY ELIG CLIN: HCPCS | Performed by: INTERNAL MEDICINE

## 2020-10-14 PROCEDURE — 2022F DILAT RTA XM EVC RTNOPTHY: CPT | Performed by: INTERNAL MEDICINE

## 2020-10-14 PROCEDURE — 3017F COLORECTAL CA SCREEN DOC REV: CPT | Performed by: INTERNAL MEDICINE

## 2020-10-14 PROCEDURE — G8417 CALC BMI ABV UP PARAM F/U: HCPCS | Performed by: INTERNAL MEDICINE

## 2020-10-14 PROCEDURE — 3051F HG A1C>EQUAL 7.0%<8.0%: CPT | Performed by: INTERNAL MEDICINE

## 2020-10-14 PROCEDURE — 1090F PRES/ABSN URINE INCON ASSESS: CPT | Performed by: INTERNAL MEDICINE

## 2020-10-14 PROCEDURE — 1101F PT FALLS ASSESS-DOCD LE1/YR: CPT | Performed by: INTERNAL MEDICINE

## 2020-10-14 PROCEDURE — G8753 SYS BP > OR = 140: HCPCS | Performed by: INTERNAL MEDICINE

## 2020-10-14 RX ORDER — METFORMIN HYDROCHLORIDE 500 MG/1
1000 TABLET, EXTENDED RELEASE ORAL 2 TIMES DAILY WITH MEALS
Qty: 360 TAB | Refills: 4 | Status: SHIPPED | OUTPATIENT
Start: 2020-10-14 | End: 2021-10-18 | Stop reason: SDUPTHER

## 2020-10-14 RX ORDER — CANDESARTAN CILEXETIL AND HYDROCHLOROTHIAZIDE 32; 12.5 MG/1; MG/1
1 TABLET ORAL DAILY
COMMUNITY
End: 2021-10-18 | Stop reason: ALTCHOICE

## 2020-10-14 RX ORDER — CEFDINIR 300 MG/1
300 CAPSULE ORAL 2 TIMES DAILY
COMMUNITY

## 2020-10-14 NOTE — PROGRESS NOTES
1. Have you been to the ER, urgent care clinic since your last visit? No  Hospitalized since your last visit? No    2. Have you seen or consulted any other health care providers outside of the [address] [place] [address] since your last visit? Include any pap smears or colon screening. No    Wt Readings from Last 3 Encounters:   10/14/20 237 lb 9.6 oz (107.8 kg)   10/16/19 229 lb 4.8 oz (104 kg)   10/16/18 227 lb 11.2 oz (103.3 kg)     Temp Readings from Last 3 Encounters:   10/14/20 97.7 °F (36.5 °C) (Oral)   10/16/19 97.3 °F (36.3 °C) (Oral)   10/16/18 97 °F (36.1 °C) (Oral)     BP Readings from Last 3 Encounters:   10/14/20 (!) 145/76   10/16/19 121/61   10/16/18 130/61     Pulse Readings from Last 3 Encounters:   10/14/20 88   10/16/19 84   10/16/18 62     Lab Results   Component Value Date/Time    Hemoglobin A1c 7.4 (H) 10/07/2020 09:18 AM     Patient does not have meter today.

## 2020-10-14 NOTE — PATIENT INSTRUCTIONS
--------------------------------------------------------------------------------------------    Refills    -    please call your pharmacy and have them send us a refill request    Results  -  allow up to a week for lab results to be processed and reviewed. Phone calls  -  Allow upto 24 hrs.  for non-urgent calls to be retained    Prior authorization - It may take up to 4 weeks to process, depending on your insurance    Forms  -  FMLA, DMV, patient assistance, etc. will take up to 2 weeks to process    Cancellations - please notify the office in advance if you cannot keep your appointment    Samples  - will only be dispensed at visits as supply is limited      If you are having a medical emergency call 911    --------------------------------------------------------------------------------------------         metformin ER to 1000 mg  Twice a day with meals       ---------------------------------------------------------------------------------------------------------------------      Do not skip meals  Do not eat in between meals    Reduce carbs- pasta, rice, potatoes, bread   Do not drink juices or sodas  Donot eat peanut butter     Do not eat sugar free cookies and cakes   Do not eat peaches, grapes, oranges, pineapples     ------------------------------------------------------------------------------------------------------------------ [Negative] : Heme/Lymph [de-identified] : as per HPI [de-identified] : as per HPI

## 2020-10-14 NOTE — PROGRESS NOTES
HISTORY OF PRESENT ILLNESS  Eliel Mayer is a 77 y.o. female. 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 She never liked this medicaiton  Current monitoring regimen: home blood tests - daily  Home blood sugar records: trend: fluctuating a bit   Any episodes of hypoglycemia? no    Weight trend: stable  Prior visit with dietician: no  Current diet: \"unhealthy\" diet in general  Current exercise: no regular exercise, knee injury     Known diabetic complications: none  Cardiovascular risk factors: dyslipidemia, diabetes mellitus, obesity, hypertension, stress, post-menopausal    Eye exam current (within one year): yes  STANISLAW: no     Past Medical History:   Diagnosis Date    HTN (hypertension)     Muscle ache     Muscle pain     Stiff joint      Past Surgical History:   Procedure Laterality Date    HX CHOLECYSTECTOMY  1976    HX KNEE REPLACEMENT Right 2012     Current Outpatient Medications   Medication Sig    candesartan-hydroCHLOROthiazide (ATACAND HCT) 32-12.5 mg per tablet Take 1 Tab by mouth daily.  cefdinir (OMNICEF) 300 mg capsule Take 300 mg by mouth two (2) times a day.  glucose blood VI test strips (FreeStyle Lite Strips) strip Test once daily.  Dx code E11.65    lancets misc Test once daily. Dx code E11.65    metFORMIN ER (GLUCOPHAGE XR) 500 mg tablet Take 2 Tabs by mouth two (2) times daily (with meals).  FEXOFENADINE HCL (ALLEGRA PO) Take  by mouth as needed.  spironolactone (ALDACTONE) 25 mg tablet Take 1 Tab by mouth two (2) times a day. Stop 50 mg dose    GLUCOSAMINE HCL/CHONDR RODRIGUES A NA (OSTEO BI-FLEX PO) Take  by mouth.  b complex vitamins tablet Take 1 Tab by mouth daily.  rosuvastatin (CRESTOR) 5 mg tablet Take 10 mg by mouth nightly.  aspirin delayed-release 81 mg tablet Take  by mouth daily.  calcium citrate-vitamin D3 (CITRACAL WITH VITAMIN D MAXIMUM) tablet Take  by mouth two (2) times a day.  ASCORBATE CALCIUM (COLLINS-C PO) Take  by mouth.  omega-3 fatty acids-vitamin e 1,000 mg cap Take 1 Cap by mouth.  TURMERIC ROOT EXTRACT PO Take  by mouth.  candesartan (ATACAND) 16 mg tablet Take 1 Tab by mouth daily. Stop atacand hctz     No current facility-administered medications for this visit. Review of Systems   Constitutional: Negative. HENT: Negative. Eyes: Negative for pain and redness. Respiratory: Negative. Cardiovascular: Negative for chest pain, palpitations and leg swelling. Gastrointestinal: Negative. Negative for constipation. Genitourinary: Negative. Musculoskeletal: Negative for myalgias. Skin: Negative. Neurological: Negative. Endo/Heme/Allergies: Negative. Psychiatric/Behavioral: Negative for depression and memory loss. The patient does not have insomnia. Physical Exam   Constitutional: She is oriented to person, place, and time. She appears well-developed and well-nourished. HENT:   Head: Normocephalic. Eyes: Conjunctivae and EOM are normal. Pupils are equal, round, and reactive to light. Neck: Normal range of motion. Neck supple. No JVD present. No tracheal deviation present. No thyromegaly present.    Cardiovascular: Normal rate, regular rhythm and normal heart sounds. No murmur heard. Pulmonary/Chest: Breath sounds normal.   Abdominal: Soft. Bowel sounds are normal.   Musculoskeletal: Normal range of motion. Lymphadenopathy:     She has no cervical adenopathy. Neurological: She is alert and oriented to person, place, and time. She has normal reflexes. Skin: Skin is warm. Psychiatric: She has a normal mood and affect. Lab Results   Component Value Date/Time    Hemoglobin A1c 7.4 (H) 10/07/2020 09:18 AM    Hemoglobin A1c 6.8 (H) 10/09/2019 09:06 AM    Hemoglobin A1c 6.2 (H) 10/09/2018 09:32 AM    Glucose 198 (H) 10/07/2020 09:18 AM    Microalbumin/Creat ratio (mg/g creat) 12 10/07/2020 09:18 AM    Microalbumin,urine random 1.56 10/07/2020 09:18 AM    LDL, calculated 59.4 10/07/2020 09:18 AM    Creatinine 0.64 10/07/2020 09:18 AM      Lab Results   Component Value Date/Time    Cholesterol, total 147 10/07/2020 09:18 AM    HDL Cholesterol 43 10/07/2020 09:18 AM    LDL, calculated 59.4 10/07/2020 09:18 AM    Triglyceride 223 (H) 10/07/2020 09:18 AM    CHOL/HDL Ratio 3.4 10/07/2020 09:18 AM       Lab Results   Component Value Date/Time    ALT (SGPT) 33 10/07/2020 09:18 AM    Alk. phosphatase 58 10/07/2020 09:18 AM    Bilirubin, total 1.3 (H) 10/07/2020 09:18 AM       Lab Results   Component Value Date/Time    GFR est AA >60 10/07/2020 09:18 AM    GFR est non-AA >60 10/07/2020 09:18 AM    Creatinine 0.64 10/07/2020 09:18 AM    BUN 12 10/07/2020 09:18 AM    Sodium 139 10/07/2020 09:18 AM    Potassium 3.9 10/07/2020 09:18 AM    Chloride 104 10/07/2020 09:18 AM    CO2 26 10/07/2020 09:18 AM      ASSESSMENT and PLAN    1.   Type 2 DM uncontrolled : a1c is 7.4 %     From     Oct  2020   Compared  To   6.8 %     FROM OCT 2019   COMPARED TO    6.7 %    From  Feb 2018     Compared to   6.7 %      From   June 2017     Compared to   7.6 %    From  March 2017    Compared to 7 .4%  From dec 2016  Compared to  7%     From Aug  2016  Compared   To 7.1 %   From march 2016 Oct 2020  Stay on  Metformin   Reassured her that metformin medication is safe to take but for whatever batches is recalled   Emphasized on  [facility]    Oct 2019       emphasized on TLC  She generally dislikes being on any kind of  prescibed meds, but ironically she takes several vitamin supplements    ASKED her to be more natural , and reminded her of eating home prepared meals     Tolerating the metformin interestingly   TLC emphasized   Discussed inj incretins but she rejected them     Patient is advised to check blood sugars 1-2 times daily by rotation method. reviewed medications and side effects in detail  lab results and schedule of future lab studies reviewed with patient        2. Hypoglycemia :  Educated on treating the hypoglycemia. 3. HTN : continue aldactone  25 mg bid  and atacand  32-12.5 , she says she takes only half pill a day  . She was stopped from HCTZ for mild renal failure, despite which she is doing good   Patient is educated about importance of compliance with anti-hypertensives especially ARB/ACEI      4. Dyslipidemia :  On  crestor at 10 mg hs     continue crestor . Patient is educated about benefits and adverse effects of statins and explained how benefits outweigh risk. 5. use of aspirin to prevent MI and TIA's discussed        6. Low BMD  :      Date : March  10  th   2016   Bone DEXA  ap spine T-score 0.3; left femoral Neck T- score  -1.5, right femoral neck T-score-1.3  Date : march 2014   Bone DEXA  ap spine T-score 0.0; left femoral Neck T- score -2.0, right femoral neck T-score -0.5  Date :  March 2018  Bone DEXA    ap spine T-score 0.4; left femoral Neck T- score  -1.5, right femoral neck T-score -1.4      dexa ordered   No prior h/o fractures     Tried  fosamax but  pt could not tolerate it   She needs to be on calcium and vit d supplementation      7. Obesity : Body mass index is 38.35 kg/m².   She has   TO  diet significantly  Commended her            8. Mild CKD - resolved   significant decline noted in short period of time   I stopped hctz, but pt figured out that she mistakenly took motrin high doses       9. FIBROMYALGIA- ON CYMBALTA FROM PCP       10.   She has  GILBERT SYNDROME           F/u in 12 months     > 50 % of time is spent on counseling   Patient voiced understanding her plan of care

## 2020-10-14 NOTE — LETTER
10/14/20 Patient: Lavelle Au YOB: 1945 Date of Visit: 10/14/2020 Edmundo Guerrier NP 
[address] [address] [address] [facility] [address] VIA Facsimile: [phone] Dear Edmundo Guerrier NP, Thank you for referring Ms. Lavelle Au to [address] [place] [address] for evaluation. My notes for this consultation are attached. If you have questions, please do not hesitate to call me. I look forward to following your patient along with you. Sincerely, Je Tiwari MD

## 2020-10-21 DIAGNOSIS — E11.65 TYPE 2 DIABETES MELLITUS WITH HYPERGLYCEMIA, WITHOUT LONG-TERM CURRENT USE OF INSULIN (HCC): ICD-10-CM

## 2020-10-21 NOTE — TELEPHONE ENCOUNTER
New machine and strips are needed. Patient at [facility] now. Was seen last week. Please call when done [phone]. Patient is waiting at [facility].  Thank you

## 2020-10-22 RX ORDER — INSULIN PUMP SYRINGE, 3 ML
EACH MISCELLANEOUS
Qty: 1 KIT | Refills: 0 | Status: SHIPPED | OUTPATIENT
Start: 2020-10-22

## 2020-10-22 RX ORDER — LANCETS
EACH MISCELLANEOUS
Qty: 100 EACH | Refills: 4 | Status: SHIPPED | OUTPATIENT
Start: 2020-10-22

## 2020-10-22 RX ORDER — BLOOD-GLUCOSE METER
KIT MISCELLANEOUS
Qty: 100 STRIP | Refills: 4 | Status: SHIPPED | OUTPATIENT
Start: 2020-10-22

## 2020-10-22 NOTE — TELEPHONE ENCOUNTER
PCP: Abbi Burgos NP    Last appt: 10/14/2020  Future Appointments   Date Time Provider Nina Salguero   10/28/2020 10:30 AM [facility] DEXA [facility] [facility]   10/18/2021  9:00 AM Hilda Edwards MD CDE BS AMB       Requested Prescriptions     Pending Prescriptions Disp Refills    glucose blood VI test strips (FreeStyle Lite Strips) strip 100 Strip 4     Sig: Test once daily. Dx code E11.65    lancets misc 100 Each 4     Sig: Test once daily. Dx code E11.65    Blood-Glucose Meter (FreeStyle Lite Meter) monitoring kit 1 Kit 0     Sig: Use to check blood glucose once daily. E11.65     Appointment on 10/07/2020   Component Date Value Ref Range Status    Hemoglobin A1c 10/07/2020 7.4* 4.0 - 5.6 % Final    Comment: NEW METHOD PLEASE NOTE NEW REFERENCE RANGE  (NOTE)  HbA1C Interpretive Ranges  <5.7              Normal  5.7 - 6.4         Consider Prediabetes  >6.5              Consider Diabetes      Est. average glucose 10/07/2020 166  mg/dL Final    LIPID PROFILE 10/07/2020        Final    Cholesterol, total 10/07/2020 147  <200 MG/DL Final    Triglyceride 10/07/2020 223* <150 MG/DL Final    Comment: Based on NCEP-ATP III:  Triglycerides <150 mg/dL  is considered normal, 150-199  mg/dL  borderline high,  200-499 mg/dL high and  greater than or equal to 500  mg/dL very high.  HDL Cholesterol 10/07/2020 43  MG/DL Final    Comment: Based on NCEP ATP III, HDL Cholesterol <40 mg/dL is considered low and >60  mg/dL is elevated.       LDL, calculated 10/07/2020 59.4  0 - 100 MG/DL Final    Comment: Based on the NCEP-ATP: LDL-C concentrations are considered  optimal <100 mg/dL,  near optimal/above Normal 100-129 mg/dL Borderline High: 130-159, High: 160-189  mg/dL Very High: Greater than or equal to 190 mg/dL      VLDL, calculated 10/07/2020 44.6  MG/DL Final    CHOL/HDL Ratio 10/07/2020 3.4  0.0 - 5.0   Final    Sodium 10/07/2020 139  136 - 145 mmol/L Final    Potassium 10/07/2020 3.9  3.5 - 5.1 mmol/L Final    Chloride 10/07/2020 104  97 - 108 mmol/L Final    CO2 10/07/2020 26  21 - 32 mmol/L Final    Anion gap 10/07/2020 9  5 - 15 mmol/L Final    Glucose 10/07/2020 198* 65 - 100 mg/dL Final    BUN 10/07/2020 12  6 - 20 MG/DL Final    Creatinine 10/07/2020 0.64  0.55 - 1.02 MG/DL Final    BUN/Creatinine ratio 10/07/2020 19  12 - 20   Final    GFR est AA 10/07/2020 >60  >60 ml/min/1.73m2 Final    GFR est non-AA 10/07/2020 >60  >60 ml/min/1.73m2 Final    Comment: Estimated GFR is calculated using the IDMS-traceable Modification of Diet in  Renal Disease (MDRD) Study equation, reported for both  Americans  (GFRAA) and non- Americans (GFRNA), and normalized to 1.73m2 body  surface area. The physician must decide which value applies to the patient.  Calcium 10/07/2020 9.2  8.5 - 10.1 MG/DL Final    Bilirubin, total 10/07/2020 1.3* 0.2 - 1.0 MG/DL Final    ALT (SGPT) 10/07/2020 33  12 - 78 U/L Final    AST (SGOT) 10/07/2020 36  15 - 37 U/L Final    Alk. phosphatase 10/07/2020 58  45 - 117 U/L Final    Protein, total 10/07/2020 6.5  6.4 - 8.2 g/dL Final    Albumin 10/07/2020 3.7  3.5 - 5.0 g/dL Final    Globulin 10/07/2020 2.8  2.0 - 4.0 g/dL Final    A-G Ratio 10/07/2020 1.3  1.1 - 2.2   Final    Microalbumin,urine random 10/07/2020 1.56  MG/DL Final    No reference range has been established.  Creatinine, urine 10/07/2020 135.00  mg/dL Final    No reference range has been established.     Microalbumin/Creat ratio (mg/g cre* 10/07/2020 12  0 - 30 mg/g Final

## 2020-10-22 NOTE — TELEPHONE ENCOUNTER
----- Message from Tres Thakkar sent at 10/22/2020  8:27 AM EDT -----  Regarding: /Refill  Medication Refill    Caller (if not patient):      Relationship of caller (if not patient):      Best contact number(s):[phone]      Name of medication and dosage if known: free style freedom monitor and free style lite testing strips      Is patient out of this medication (yes/no):yes      Pharmacy name:[facility] listed in chart? (yes/no):yes  Pharmacy phone [phone]      Details to clarify the request:Pt returning a oli from the office informing the names of the monitor and strips .       Tres Thakkar

## 2020-10-28 ENCOUNTER — HOSPITAL ENCOUNTER (OUTPATIENT)
Dept: MAMMOGRAPHY | Age: 75
Discharge: HOME | End: 2020-10-28
Attending: INTERNAL MEDICINE
Payer: MEDICARE

## 2020-10-28 DIAGNOSIS — M81.0 SENILE OSTEOPOROSIS: ICD-10-CM

## 2020-10-28 DIAGNOSIS — E78.2 MIXED HYPERLIPIDEMIA: ICD-10-CM

## 2020-10-28 DIAGNOSIS — E11.65 TYPE 2 DIABETES MELLITUS WITH HYPERGLYCEMIA, WITHOUT LONG-TERM CURRENT USE OF INSULIN (HCC): ICD-10-CM

## 2020-10-28 DIAGNOSIS — E66.01 SEVERE OBESITY (HCC): ICD-10-CM

## 2020-10-28 DIAGNOSIS — I10 ESSENTIAL HYPERTENSION: ICD-10-CM

## 2020-10-28 PROCEDURE — 77080 DXA BONE DENSITY AXIAL: CPT

## 2021-09-08 ENCOUNTER — TRANSCRIBE ORDER (OUTPATIENT)
Dept: SCHEDULING | Age: 76
End: 2021-09-08

## 2021-09-08 DIAGNOSIS — M54.17 LUMBOSACRAL NEURITIS: Primary | ICD-10-CM

## 2021-09-08 DIAGNOSIS — M51.37 DEGENERATION OF LUMBAR OR LUMBOSACRAL INTERVERTEBRAL DISC: ICD-10-CM

## 2021-10-18 ENCOUNTER — OFFICE VISIT (OUTPATIENT)
Dept: ENDOCRINOLOGY | Age: 76
End: 2021-10-18
Payer: MEDICARE

## 2021-10-18 VITALS
HEART RATE: 80 BPM | SYSTOLIC BLOOD PRESSURE: 151 MMHG | TEMPERATURE: 97.2 F | BODY MASS INDEX: 37.23 KG/M2 | WEIGHT: 231.63 LBS | OXYGEN SATURATION: 98 % | HEIGHT: 66 IN | DIASTOLIC BLOOD PRESSURE: 54 MMHG | RESPIRATION RATE: 20 BRPM

## 2021-10-18 DIAGNOSIS — I10 ESSENTIAL HYPERTENSION: ICD-10-CM

## 2021-10-18 DIAGNOSIS — M81.0 SENILE OSTEOPOROSIS: ICD-10-CM

## 2021-10-18 DIAGNOSIS — E66.01 SEVERE OBESITY (HCC): ICD-10-CM

## 2021-10-18 DIAGNOSIS — E11.65 TYPE 2 DIABETES MELLITUS WITH HYPERGLYCEMIA, WITHOUT LONG-TERM CURRENT USE OF INSULIN (HCC): Primary | ICD-10-CM

## 2021-10-18 DIAGNOSIS — E78.2 MIXED HYPERLIPIDEMIA: ICD-10-CM

## 2021-10-18 LAB
ALBUMIN SERPL-MCNC: 3.9 G/DL (ref 3.5–5)
ALBUMIN/GLOB SERPL: 1.3 {RATIO} (ref 1.1–2.2)
ALP SERPL-CCNC: 58 U/L (ref 45–117)
ALT SERPL-CCNC: 24 U/L (ref 12–78)
ANION GAP SERPL CALC-SCNC: 7 MMOL/L (ref 5–15)
AST SERPL-CCNC: 25 U/L (ref 15–37)
BILIRUB SERPL-MCNC: 1.4 MG/DL (ref 0.2–1)
BUN SERPL-MCNC: 16 MG/DL (ref 6–20)
BUN/CREAT SERPL: 19 (ref 12–20)
CALCIUM SERPL-MCNC: 9.1 MG/DL (ref 8.5–10.1)
CHLORIDE SERPL-SCNC: 105 MMOL/L (ref 97–108)
CHOLEST SERPL-MCNC: 152 MG/DL
CO2 SERPL-SCNC: 27 MMOL/L (ref 21–32)
CREAT SERPL-MCNC: 0.83 MG/DL (ref 0.55–1.02)
CREAT UR-MCNC: 135 MG/DL
GLOBULIN SER CALC-MCNC: 3 G/DL (ref 2–4)
GLUCOSE SERPL-MCNC: 257 MG/DL (ref 65–100)
HBA1C MFR BLD HPLC: 8.1 %
HDLC SERPL-MCNC: 46 MG/DL
HDLC SERPL: 3.3 {RATIO} (ref 0–5)
LDLC SERPL CALC-MCNC: 54 MG/DL (ref 0–100)
MICROALBUMIN UR-MCNC: 1.49 MG/DL
MICROALBUMIN/CREAT UR-RTO: 11 MG/G (ref 0–30)
POTASSIUM SERPL-SCNC: 4.3 MMOL/L (ref 3.5–5.1)
PROT SERPL-MCNC: 6.9 G/DL (ref 6.4–8.2)
SODIUM SERPL-SCNC: 139 MMOL/L (ref 136–145)
TRIGL SERPL-MCNC: 260 MG/DL (ref ?–150)
VLDLC SERPL CALC-MCNC: 52 MG/DL

## 2021-10-18 PROCEDURE — G8510 SCR DEP NEG, NO PLAN REQD: HCPCS | Performed by: INTERNAL MEDICINE

## 2021-10-18 PROCEDURE — 99214 OFFICE O/P EST MOD 30 MIN: CPT | Performed by: INTERNAL MEDICINE

## 2021-10-18 PROCEDURE — G8417 CALC BMI ABV UP PARAM F/U: HCPCS | Performed by: INTERNAL MEDICINE

## 2021-10-18 PROCEDURE — 1090F PRES/ABSN URINE INCON ASSESS: CPT | Performed by: INTERNAL MEDICINE

## 2021-10-18 PROCEDURE — 3017F COLORECTAL CA SCREEN DOC REV: CPT | Performed by: INTERNAL MEDICINE

## 2021-10-18 PROCEDURE — 2022F DILAT RTA XM EVC RTNOPTHY: CPT | Performed by: INTERNAL MEDICINE

## 2021-10-18 PROCEDURE — G8427 DOCREV CUR MEDS BY ELIG CLIN: HCPCS | Performed by: INTERNAL MEDICINE

## 2021-10-18 PROCEDURE — 83036 HEMOGLOBIN GLYCOSYLATED A1C: CPT | Performed by: INTERNAL MEDICINE

## 2021-10-18 PROCEDURE — 3052F HG A1C>EQUAL 8.0%<EQUAL 9.0%: CPT | Performed by: INTERNAL MEDICINE

## 2021-10-18 PROCEDURE — G8754 DIAS BP LESS 90: HCPCS | Performed by: INTERNAL MEDICINE

## 2021-10-18 PROCEDURE — G8536 NO DOC ELDER MAL SCRN: HCPCS | Performed by: INTERNAL MEDICINE

## 2021-10-18 PROCEDURE — 1101F PT FALLS ASSESS-DOCD LE1/YR: CPT | Performed by: INTERNAL MEDICINE

## 2021-10-18 PROCEDURE — G8753 SYS BP > OR = 140: HCPCS | Performed by: INTERNAL MEDICINE

## 2021-10-18 RX ORDER — METFORMIN HYDROCHLORIDE 500 MG/1
1000 TABLET, EXTENDED RELEASE ORAL 2 TIMES DAILY WITH MEALS
Qty: 360 TABLET | Refills: 3 | Status: SHIPPED | OUTPATIENT
Start: 2021-10-18 | End: 2022-10-26 | Stop reason: SDUPTHER

## 2021-10-18 RX ORDER — FEXOFENADINE HCL AND PSEUDOEPHEDRINE HCI 60; 120 MG/1; MG/1
1 TABLET, EXTENDED RELEASE ORAL
COMMUNITY
Start: 2021-04-21

## 2021-10-18 RX ORDER — CANDESARTAN 32 MG/1
16 TABLET ORAL
COMMUNITY
Start: 2021-07-26

## 2021-10-18 RX ORDER — DULAGLUTIDE 0.75 MG/.5ML
0.75 INJECTION, SOLUTION SUBCUTANEOUS
Qty: 6 ML | Refills: 3 | Status: SHIPPED | OUTPATIENT
Start: 2021-10-18 | End: 2022-03-28 | Stop reason: SDUPTHER

## 2021-10-18 NOTE — PATIENT INSTRUCTIONS
SPECIFIC INSTRUCTIONS BELOW     metformin ER  500 mg  2  Pills    Twice a day with meals     Start on trulicity 9.46 mg a week             -------------PAY ATTENTION TO THESE GENERAL INSTRUCTIONS -----------------      - The medications prescribed at this visit will not be available at pharmacy until 6 pm       - YOUR MED LIST IS NOT UP TO DATE AS SOME CHANGES ARE BEING MADE AFTER THE VISIT - FOLLOW SPECIFIC INSTRUCTIONS  ABOVE     -ANY tests other than blood work, which you opt to do  outside the  [facility] facilities, you are responsible for prior authorizations if  required    - [phone] [facility]- PLEASE IGNORE     Results     *Normal results will not be notified by a phone call starting January 1 2021   *If you have an upcoming visit, the results will be discussed at the visit   *Please sign up for MY CHART if you want access to your lab and test results  *Abnormal results which require immediate attention will be notified by phone call   *Abnormal results which do not require immediate assistance will be notified in 1-2 weeks       Refills    -    have your pharmacy send us a refill request . Refills are done max for one year and a visit is a must before refills are extended    Follow up appointments -  highly encourage you to make it when you are checking out. We can accommodate you into the schedule based on your clinical situation, but not for extending refills beyond a year. Labs are important to give refills and is important to get labs before the visit     Phone calls  -  Allow  24 hrs.  for non-urgent calls to be returned  Prior authorization - It may take 2-4 weeks to process  Forms  -  FMLA, DMV etc., will take up to 2 weeks to process  Cancellations - please notify the office 2 days in advance   Samples  - will only be dispensed at visits       If not showing for the appointments and cancelling appointments within 24 hours are kept track of and three  of such situations in  two consecutive years will likely be considered for termination from the practice    -------------------------------------------------------------------------------------------------------------------        --

## 2021-10-18 NOTE — PROGRESS NOTES
HISTORY OF PRESENT ILLNESS  Edgar Raymundo is a 77 y.o. female. HPI  Patient here for  Yearly  F/u after last  visit of Type 2 diabetes mellitus  And osteoporosis  From oct 2020      Lost 6 lb   Makes sweets for       Oct 2020   Gained 8 lbs   She is doing well   She \"doubts\" metformin since there is recall    Old hsitory     She is f/u for ARF  She had high creat level in feb 2018   She had changed to new pcp   She was taking motrin high dose 600 mg, instead of metformin out of confusion - ( they look alike per her )      Old history   Referred : by self  H/o diabetes for 1 year   Current A1C is 7.1 % and symptoms/problems include none  Current diabetic medications include metformin. She never liked this medicaiton  Current monitoring regimen: home blood tests - daily         Review of Systems   Constitutional: Negative. Psychiatric/Behavioral: Negative for depression and memory loss. The patient does not have insomnia. Physical Exam   Constitutional: She is oriented to person, place, and time. She appears well-developed and well-nourished. Psychiatric: She has a normal mood and affect. Labs not done          ASSESSMENT and PLAN    1.   Type 2 DM uncontrolled : a1c is  8.1 %    From  Today   October 2021   Compared to   7.4 %     From     Oct  2020   Compared  To   6.8 %     FROM OCT 2019   COMPARED TO    6.7 %    From  Feb 2018     Compared to   6.7 %      From   June 2017     Compared to   7.6 %    From  March 2017    Compared to 7 .4%  From dec 2016  Compared to  7%     From Aug  2016  Compared   To 7.1 %   From march 2016       Oct 2021     Lost control , she is not diet compliant   Stay on metformin  Er 500 mg 2 pills bid   Start on trulicity 1.92  Mg a week     Oct 2020  Stay on  Metformin   Reassured her that metformin medication is safe to take but for whatever batches is recalled   Emphasized on  [facility]    Oct 2019       emphasized on TLC  She generally dislikes being on any kind of  prescibed meds, but ironically she takes several vitamin supplements    ASKED her to be more natural , and reminded her of eating home prepared meals     Tolerating the metformin interestingly   TLC emphasized   Discussed inj incretins but she rejected them       2. Hypoglycemia :  Educated on treating the hypoglycemia. 3. HTN : continue aldactone  25 mg bid  and atacand  32 , she takes only half pill a day  . She was stopped from HCTZ for mild renal failure      4. Dyslipidemia :  On  crestor at 10 mg hs         5. Low BMD  :      Date : March  10  th   2016   Bone DEXA  ap spine T-score 0.3; left femoral Neck T- score  -1.5, right femoral neck T-score-1.3  Date : march 2014   Bone DEXA  ap spine T-score 0.0; left femoral Neck T- score -2.0, right femoral neck T-score -0.5  Date :  March 2018  Bone DEXA    ap spine T-score 0.4; left femoral Neck T- score  -1.5, right femoral neck T-score -1.4      dexa ordered   No prior h/o fractures     Tried  fosamax but  pt could not tolerate it   She needs to be on calcium and vit d supplementation      7. Obesity : Body mass index is 37.39 kg/m². She has   TO  diet significantly        8. Mild CKD - resolved   significant decline noted in short period of time   I stopped hctz, but pt figured out that she mistakenly took motrin high doses       9. FIBROMYALGIA- ON CYMBALTA FROM PCP       10.   She has  GILBERT SYNDROME           F/u in 6 months       Reviewed results with patient and discussed the labs being ordered today/bnv  Patient voiced understanding of plan of care     Patient voiced understanding her plan of care

## 2021-10-18 NOTE — LETTER
10/18/2021    Patient: Akila Yeboah   YOB: 1945   Date of Visit: 10/18/2021     Yasmeen Wills NP  [address] [place] [address] [address] [address]  Via Fax: [phone]    Dear Yasmeen Wills NP,      Thank you for referring Ms. Akila Yeboah to [address] [place] [address] for evaluation. My notes for this consultation are attached. If you have questions, please do not hesitate to call me. I look forward to following your patient along with you.       Sincerely,    Randall Tabares MD

## 2022-03-19 PROBLEM — E66.01 SEVERE OBESITY (HCC): Status: ACTIVE | Noted: 2019-10-16

## 2022-03-28 DIAGNOSIS — E11.65 TYPE 2 DIABETES MELLITUS WITH HYPERGLYCEMIA, WITHOUT LONG-TERM CURRENT USE OF INSULIN (HCC): Primary | ICD-10-CM

## 2022-03-28 DIAGNOSIS — E66.01 SEVERE OBESITY (HCC): ICD-10-CM

## 2022-03-28 RX ORDER — DULAGLUTIDE 0.75 MG/.5ML
0.75 INJECTION, SOLUTION SUBCUTANEOUS
Qty: 6 ML | Refills: 3 | Status: SHIPPED | OUTPATIENT
Start: 2022-03-28 | End: 2022-11-02 | Stop reason: SDUPTHER

## 2022-04-12 LAB
ALBUMIN SERPL-MCNC: 4.6 G/DL (ref 3.7–4.7)
ALBUMIN/CREAT UR: 7 MG/G CREAT (ref 0–29)
ALBUMIN/GLOB SERPL: 2 {RATIO} (ref 1.2–2.2)
ALP SERPL-CCNC: 56 IU/L (ref 44–121)
ALT SERPL-CCNC: 7 IU/L (ref 0–32)
AST SERPL-CCNC: 13 IU/L (ref 0–40)
BILIRUB SERPL-MCNC: 1.4 MG/DL (ref 0–1.2)
BUN SERPL-MCNC: 12 MG/DL (ref 8–27)
BUN/CREAT SERPL: 19 (ref 12–28)
CALCIUM SERPL-MCNC: 9.9 MG/DL (ref 8.7–10.3)
CHLORIDE SERPL-SCNC: 101 MMOL/L (ref 96–106)
CHOLEST SERPL-MCNC: 155 MG/DL (ref 100–199)
CO2 SERPL-SCNC: 22 MMOL/L (ref 20–29)
CREAT SERPL-MCNC: 0.64 MG/DL (ref 0.57–1)
CREAT UR-MCNC: 121.2 MG/DL
EGFR: 92 ML/MIN/1.73
EST. AVERAGE GLUCOSE BLD GHB EST-MCNC: 131 MG/DL
GLOBULIN SER CALC-MCNC: 2.3 G/DL (ref 1.5–4.5)
GLUCOSE SERPL-MCNC: 109 MG/DL (ref 65–99)
HBA1C MFR BLD: 6.2 % (ref 4.8–5.6)
HDLC SERPL-MCNC: 44 MG/DL
IMP & REVIEW OF LAB RESULTS: NORMAL
LDLC SERPL CALC-MCNC: 79 MG/DL (ref 0–99)
MICROALBUMIN UR-MCNC: 8.3 UG/ML
POTASSIUM SERPL-SCNC: 4.6 MMOL/L (ref 3.5–5.2)
PROT SERPL-MCNC: 6.9 G/DL (ref 6–8.5)
SODIUM SERPL-SCNC: 141 MMOL/L (ref 134–144)
TRIGL SERPL-MCNC: 188 MG/DL (ref 0–149)
VLDLC SERPL CALC-MCNC: 32 MG/DL (ref 5–40)

## 2022-05-02 ENCOUNTER — OFFICE VISIT (OUTPATIENT)
Dept: ENDOCRINOLOGY | Age: 77
End: 2022-05-02
Payer: MEDICARE

## 2022-05-02 VITALS
HEIGHT: 66 IN | BODY MASS INDEX: 33.78 KG/M2 | OXYGEN SATURATION: 97 % | HEART RATE: 78 BPM | SYSTOLIC BLOOD PRESSURE: 120 MMHG | WEIGHT: 210.2 LBS | TEMPERATURE: 97.7 F | DIASTOLIC BLOOD PRESSURE: 48 MMHG

## 2022-05-02 DIAGNOSIS — E66.01 SEVERE OBESITY (HCC): ICD-10-CM

## 2022-05-02 DIAGNOSIS — E11.65 TYPE 2 DIABETES MELLITUS WITH HYPERGLYCEMIA, WITHOUT LONG-TERM CURRENT USE OF INSULIN (HCC): Primary | ICD-10-CM

## 2022-05-02 DIAGNOSIS — I10 ESSENTIAL HYPERTENSION: ICD-10-CM

## 2022-05-02 DIAGNOSIS — M81.0 SENILE OSTEOPOROSIS: ICD-10-CM

## 2022-05-02 DIAGNOSIS — E78.2 MIXED HYPERLIPIDEMIA: ICD-10-CM

## 2022-05-02 PROCEDURE — 2022F DILAT RTA XM EVC RTNOPTHY: CPT | Performed by: INTERNAL MEDICINE

## 2022-05-02 PROCEDURE — G8427 DOCREV CUR MEDS BY ELIG CLIN: HCPCS | Performed by: INTERNAL MEDICINE

## 2022-05-02 PROCEDURE — G8754 DIAS BP LESS 90: HCPCS | Performed by: INTERNAL MEDICINE

## 2022-05-02 PROCEDURE — 3017F COLORECTAL CA SCREEN DOC REV: CPT | Performed by: INTERNAL MEDICINE

## 2022-05-02 PROCEDURE — G8432 DEP SCR NOT DOC, RNG: HCPCS | Performed by: INTERNAL MEDICINE

## 2022-05-02 PROCEDURE — G8417 CALC BMI ABV UP PARAM F/U: HCPCS | Performed by: INTERNAL MEDICINE

## 2022-05-02 PROCEDURE — 1090F PRES/ABSN URINE INCON ASSESS: CPT | Performed by: INTERNAL MEDICINE

## 2022-05-02 PROCEDURE — G8536 NO DOC ELDER MAL SCRN: HCPCS | Performed by: INTERNAL MEDICINE

## 2022-05-02 PROCEDURE — 99214 OFFICE O/P EST MOD 30 MIN: CPT | Performed by: INTERNAL MEDICINE

## 2022-05-02 PROCEDURE — 1101F PT FALLS ASSESS-DOCD LE1/YR: CPT | Performed by: INTERNAL MEDICINE

## 2022-05-02 PROCEDURE — G8752 SYS BP LESS 140: HCPCS | Performed by: INTERNAL MEDICINE

## 2022-05-02 PROCEDURE — 3044F HG A1C LEVEL LT 7.0%: CPT | Performed by: INTERNAL MEDICINE

## 2022-05-02 RX ORDER — DEXTROMETHORPHAN HYDROBROMIDE, GUAIFENESIN 5; 100 MG/5ML; MG/5ML
650 LIQUID ORAL EVERY 8 HOURS
COMMUNITY

## 2022-05-02 NOTE — PROGRESS NOTES
HISTORY OF PRESENT ILLNESS  Hal Handy is a 77 y.o. female. 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 She never liked this medicaiton  Current monitoring regimen: home blood tests - daily         Review of Systems   Constitutional: Negative. Psychiatric/Behavioral: Negative for depression and memory loss. The patient does not have insomnia. Physical Exam   Constitutional: She is oriented to person, place, and time. She appears well-developed and well-nourished. Psychiatric: She has a normal mood and affect.          Lab Results   Component Value Date/Time    Hemoglobin A1c 6.2 (H) 04/11/2022 12:00 AM    Hemoglobin A1c 7.4 (H) 10/07/2020 09:18 AM    Hemoglobin A1c 6.8 (H) 10/09/2019 09:06 AM    Glucose 109 (H) 04/11/2022 12:00 AM    Microalbumin/Creat ratio (mg/g creat) 11 10/18/2021 09:42 AM    Microalb/Creat ratio (ug/mg creat.) 7 04/11/2022 12:00 AM Microalbumin,urine random 1.49 10/18/2021 09:42 AM    LDL, calculated 79 04/11/2022 12:00 AM    LDL, calculated 54 10/18/2021 09:42 AM    Creatinine 0.64 04/11/2022 12:00 AM      Lab Results   Component Value Date/Time    Cholesterol, total 155 04/11/2022 12:00 AM    HDL Cholesterol 44 04/11/2022 12:00 AM    LDL, calculated 79 04/11/2022 12:00 AM    LDL, calculated 54 10/18/2021 09:42 AM    Triglyceride 188 (H) 04/11/2022 12:00 AM    CHOL/HDL Ratio 3.3 10/18/2021 09:42 AM     Lab Results   Component Value Date/Time    ALT (SGPT) 7 04/11/2022 12:00 AM    Alk. phosphatase 56 04/11/2022 12:00 AM    Bilirubin, total 1.4 (H) 04/11/2022 12:00 AM    Albumin 4.6 04/11/2022 12:00 AM    Protein, total 6.9 04/11/2022 12:00 AM     Lab Results   Component Value Date/Time    GFR est non-AA >60 10/18/2021 09:42 AM    GFR est AA >60 10/18/2021 09:42 AM    Creatinine 0.64 04/11/2022 12:00 AM    BUN 12 04/11/2022 12:00 AM    Sodium 141 04/11/2022 12:00 AM    Potassium 4.6 04/11/2022 12:00 AM    Chloride 101 04/11/2022 12:00 AM    CO2 22 04/11/2022 12:00 AM              ASSESSMENT and PLAN    1.   Type 2 DM uncontrolled : a1c is  6.2 %    From  April 2022   Compared to    8.1 %    From  Today   October 2021   Compared to   7.4 %     From     Oct  2020   Compared  To   6.8 %     FROM OCT 2019   COMPARED TO    6.7 %    From  Feb 2018     Compared to   6.7 %      From   June 2017     Compared to   7.6 %    From  March 2017    Compared to 7 .4%  From dec 2016  Compared to  7%     From Aug  2016  Compared   To 7.1 %   From march 2016 April 2022      Improved glycemic control  With start of trulicity   Stay on trulicity  And metformin er  2 pills bid     She is suffering from constipation       Oct 2021     Lost control , she is not diet compliant   Stay on metformin  Er 500 mg 2 pills bid   Start on trulicity 0.82  Mg a week     Oct 2020  Stay on  Metformin   Reassured her that metformin medication is safe to take but for whatever batches is recalled   Emphasized on  TLC        2. Hypoglycemia :  Educated on treating the hypoglycemia. 3. HTN : continue aldactone  25 mg bid  and atacand  32 , she takes only half pill a day  . She was stopped from HCTZ for mild renal failure      4. Dyslipidemia :  On  crestor at 10 mg hs         5. Low BMD  :      Date : March  10  th   2016   Bone DEXA  ap spine T-score 0.3; left femoral Neck T- score  -1.5, right femoral neck T-score-1.3  Date : march 2014   Bone DEXA  ap spine T-score 0.0; left femoral Neck T- score -2.0, right femoral neck T-score -0.5  Date :  March 2018  Bone DEXA    ap spine T-score 0.4; left femoral Neck T- score  -1.5, right femoral neck T-score -1.4      dexa ordered   No prior h/o fractures     Tried  fosamax but  pt could not tolerate it   She needs to be on calcium and vit d supplementation      7. Obesity : There is no height or weight on file to calculate BMI. She has   TO  diet significantly        8. Mild CKD - resolved   significant decline noted in short period of time   I stopped hctz, but pt figured out that she mistakenly took motrin high doses       9. FIBROMYALGIA- ON CYMBALTA FROM PCP       10. She has  GILBERT SYNDROME       11. Hypomagnesemia  :  Chronic  ( she had surgeries on her kidneys  ) - left kidney  Saved from aneurysm per pt   Reviewed labs   To try  prescription magnesium to lessen the side effects        12.  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

## 2022-05-02 NOTE — PATIENT INSTRUCTIONS
SPECIFIC INSTRUCTIONS BELOW     metformin ER  500 mg  2  Pills    Twice a day with meals     Stay  on trulicity 7.69 mg a week           -------------PAY ATTENTION TO THESE GENERAL INSTRUCTIONS -----------------      - The medications prescribed at this visit will not be available at pharmacy until 6 pm       - YOUR MED LIST IS NOT UP TO DATE AS SOME CHANGES ARE BEING MADE AFTER THE VISIT - FOLLOW SPECIFIC INSTRUCTIONS  ABOVE     -ANY tests other than blood work, which you opt to do  outside the  [facility] facilities, you are responsible for prior authorizations if  required    - [phone] [facility]- PLEASE IGNORE     Results     *Normal results will not be notified by a phone call starting January 1 2021   *If you have an upcoming visit, the results will be discussed at the visit   *Please sign up for MY CHART if you want access to your lab and test results  *Abnormal results which require immediate attention will be notified by phone call   *Abnormal results which do not require immediate assistance will be notified in 1-2 weeks       Refills    -    have your pharmacy send us a refill request . Refills are done max for one year and a visit is a must before refills are extended    Follow up appointments -  highly encourage you to make it when you are checking out. We can accommodate you into the schedule based on your clinical situation, but not for extending refills beyond a year. Labs are important to give refills and is important to get labs before the visit     Phone calls  -  Allow  24 hrs.  for non-urgent calls to be returned  Prior authorization - It may take 2-4 weeks to process  Forms  -  FMLA, DMV etc., will take up to 2 weeks to process  Cancellations - please notify the office 2 days in advance   Samples  - will only be dispensed at visits       If not showing for the appointments and cancelling appointments within 24 hours are kept track of and three of such situations in  two consecutive years will likely be considered for termination from the practice    -------------------------------------------------------------------------------------------------------------------

## 2022-05-02 NOTE — LETTER
5/2/2022    Patient: Norman Segovia   YOB: 1945   Date of Visit: 5/2/2022     Meek Reyes NP  [address] [place] [address] [address] [id number]  Via Fax: [phone]    Dear Meek Reyes NP,      Thank you for referring Ms. Norman Segovia to [address] [place] [address] for evaluation. My notes for this consultation are attached. If you have questions, please do not hesitate to call me. I look forward to following your patient along with you.       Sincerely,    Melecio Sharp MD

## 2022-05-02 NOTE — PROGRESS NOTES
Fannie Ford is a 77 y.o. female here for   Chief Complaint   Patient presents with    Diabetes    Osteoporosis       1. Have you been to the ER, urgent care clinic since your last visit? Hospitalized since your last visit? - no    2. Have you seen or consulted any other health care providers outside of the [address] [place] [address] since your last visit?   Include any pap smears or colon screening.- Sherry Thomas PCP

## 2022-06-20 DIAGNOSIS — M25.562 LEFT KNEE PAIN, UNSPECIFIED CHRONICITY: Primary | ICD-10-CM

## 2022-06-22 ENCOUNTER — OFFICE VISIT (OUTPATIENT)
Dept: ORTHOPEDIC SURGERY | Age: 77
End: 2022-06-22
Payer: MEDICARE

## 2022-06-22 VITALS — BODY MASS INDEX: 33.75 KG/M2 | HEIGHT: 66 IN | WEIGHT: 210 LBS

## 2022-06-22 DIAGNOSIS — M17.12 PRIMARY OSTEOARTHRITIS OF LEFT KNEE: ICD-10-CM

## 2022-06-22 DIAGNOSIS — M25.562 LEFT KNEE PAIN, UNSPECIFIED CHRONICITY: ICD-10-CM

## 2022-06-22 DIAGNOSIS — M25.562 LEFT KNEE PAIN, UNSPECIFIED CHRONICITY: Primary | ICD-10-CM

## 2022-06-22 PROCEDURE — 99204 OFFICE O/P NEW MOD 45 MIN: CPT | Performed by: ORTHOPAEDIC SURGERY

## 2022-06-22 RX ORDER — DULAGLUTIDE 0.75 MG/.5ML
INJECTION, SOLUTION SUBCUTANEOUS
COMMUNITY
Start: 2022-03-28 | End: 2022-11-02 | Stop reason: SDUPTHER

## 2022-06-22 RX ORDER — ROSUVASTATIN CALCIUM 10 MG/1
10 TABLET, COATED ORAL
COMMUNITY
Start: 2022-04-01

## 2022-06-22 NOTE — PROGRESS NOTES
Name: Yao Harley    : 1945     Service Dept: [address] [place] [address] and Sports Medicine    Chief Complaint   Patient presents with    Knee Pain        Visit Vitals  Ht 5' 6\" (1.676 m)   Wt 210 lb (95.3 kg)   BMI 33.89 kg/m²        No Known Allergies     Current Outpatient Medications   Medication Sig Dispense Refill    Trulicity 1.04 RS/6.9 mL sub-q pen       rosuvastatin (CRESTOR) 10 mg tablet         There is no problem list on file for this patient. Family History   Problem Relation Age of Onset    Cancer Mother     Heart Disease Father     Cancer Sister       Social History     Socioeconomic History    Marital status: UNKNOWN   Tobacco Use    Smoking status: Never Smoker    Smokeless tobacco: Never Used   Substance and Sexual Activity    Alcohol use: Not Currently      History reviewed. No pertinent surgical history. Past Medical History:   Diagnosis Date    Diabetes ([address]     H/O blood clots     High cholesterol     Hypertension         I have reviewed and agree with [address] [place] [address] Nw and ROS and intake form in chart and the record furthermore I have reviewed prior medical record(s) regarding this patients care during this appointment. Review of Systems:   Patient is a pleasant appearing individual, appropriately dressed, well hydrated, well nourished, who is alert, appropriately oriented for age, and in no acute distress with a normal gait and normal affect who does not appear to be in any significant pain.      Physical Exam:  Left Knee -Decrease range of motion with flexion, Knee arc of greater than 50 degrees, Some crepitation, Grossly neurovascularly intact, Good cap refill, No skin lesion, Moderate swelling, No gross instability, Some quadriceps weakness, Kellgren and Adiel at least grade 3    Right Knee - Full Range of Motion, No crepitation, Grossly neurovascularly intact, Good cap refill, No skin lesion, No swelling, No gross instability, No quadriceps weakness Encounter Diagnoses     ICD-10-CM ICD-9-CM   1. Left knee pain, unspecified chronicity  M25.562 719.46   2. Primary osteoarthritis of left knee  M17.12 715.16       HPI:  The patient is here with a chief complaint of left knee pain, throbbing, burning pain, progressively getting worse. Pain is 8/10. Failed conservative treatment. X-rays are positive for severe OA. Assessment/Plan:  Plan will be for left total knee replacement. She has got a history of blood clots. We will have her see Vascular Surgery. General medical and cardiac clearance, inpatient stay, and we will get it set up and go from there. As part of continued conservative pain management options the patient was advised to utilize Tylenol or OTC NSAIDS as long as it is not medically contraindicated. Return to Office: Follow-up and Dispositions    · Return for [place] [address] FOR SURGERY. Scribed by David Miller as dictated by Jessi Vazquez. Jennifer Solorio MD.  Documentation True and Accepted Nicholas Solorio MD

## 2022-06-22 NOTE — PATIENT INSTRUCTIONS
Knee Arthritis: Care Instructions  Your Care Instructions     Knee arthritis is a breakdown of the cartilage that cushions your knee joint. When the cartilage wears down, your bones rub against each other. This causes pain and stiffness. Knee arthritis tends to get worse with time. Treatment for knee arthritis involves reducing pain, making the leg muscles stronger, and staying at a healthy body weight. The treatment usually does not improve the health of the cartilage, but it can reduce pain and improve how well your knee works. You can take simple measures to protect your knee joints, ease your pain, and help you stay active. Follow-up care is a key part of your treatment and safety. Be sure to make and go to all appointments, and call your doctor if you are having problems. It's also a good idea to know your test results and keep a list of the medicines you take. How can you care for yourself at home? · Know that knee arthritis will cause more pain on some days than on others. · Stay at a healthy weight. Lose weight if you are overweight. When you stand up, the pressure on your knees from every pound of body weight is multiplied four times. So if you lose 10 pounds, you will reduce the pressure on your knees by 40 pounds. · Talk to your doctor or physical therapist about exercises that will help ease joint pain. ? Stretch to help prevent stiffness and to prevent injury before you exercise. You may enjoy gentle forms of yoga to help keep your knee joints and muscles flexible. ? Walk instead of jog.  ? Ride a bike. This makes your thigh muscles stronger and takes pressure off your knee. ? Wear well-fitting and comfortable shoes. ? Exercise in chest-deep water. This can help you exercise longer with less pain. ? Avoid exercises that include squatting or kneeling. They can put a lot of strain on your knees.   ? Talk to your doctor to make sure that the exercise you do is not making the arthritis worse.  · Do not sit for long periods of time. Try to walk once in a while to keep your knee from getting stiff. · Ask your doctor or physical therapist whether shoe inserts may reduce your arthritis pain. · If you can afford it, get new athletic shoes at least every year. This can help reduce the strain on your knees. · Use a device to help you do everyday activities. ? A cane or walking stick can help you keep your balance when you walk. Hold the cane or walking stick in the hand opposite the painful knee. ? If you feel like you may fall when you walk, try using crutches or a front-wheeled walker. These can prevent falls that could cause more damage to your knee. ? A knee brace may help keep your knee stable and prevent pain. ? You also can use other things to make life easier, such as a higher toilet seat and handrails in the bathtub or shower. · Take pain medicines exactly as directed. ? Do not wait until you are in severe pain. You will get better results if you take it sooner. ? If you are not taking a prescription pain medicine, take an over-the-counter medicine such as acetaminophen (Tylenol), ibuprofen (Advil, Motrin), or naproxen (Aleve). Read and follow all instructions on the label. ? Do not take two or more pain medicines at the same time unless the doctor told you to. Many pain medicines have acetaminophen, which is Tylenol. Too much acetaminophen (Tylenol) can be harmful. ? Tell your doctor if you take a blood thinner, have diabetes, or have allergies to shellfish. · Ask your doctor if you might benefit from a shot of steroid medicine into your knee. This may provide pain relief for several months. · Many people take the supplements glucosamine and chondroitin for osteoarthritis. Some people feel they help, but the medical research does not show that they work. Talk to your doctor before you take these supplements. When should you call for help?    Call your doctor now or seek immediate medical care if:    · You have sudden swelling, warmth, or pain in your knee.     · You have knee pain and a fever or rash.     · You have such bad pain that you cannot use your knee. Watch closely for changes in your health, and be sure to contact your doctor if you have any problems. Where can you learn more? Go to http://www.gray.com/  Enter W187 in the search box to learn more about \"Knee Arthritis: Care Instructions. \"  Current as of: December 20, 2021               Content Version: 13.2  © 3115-1395 [employer]. Care instructions adapted under license by [employer] (which disclaims liability or warranty for this information). If you have questions about a medical condition or this instruction, always ask your healthcare professional. [address] any warranty or liability for your use of this information.

## 2022-07-13 ENCOUNTER — OFFICE VISIT (OUTPATIENT)
Dept: ORTHOPEDIC SURGERY | Age: 77
End: 2022-07-13
Payer: MEDICARE

## 2022-07-13 DIAGNOSIS — M17.12 PRIMARY OSTEOARTHRITIS OF LEFT KNEE: ICD-10-CM

## 2022-07-13 DIAGNOSIS — M25.562 LEFT KNEE PAIN, UNSPECIFIED CHRONICITY: Primary | ICD-10-CM

## 2022-07-13 PROCEDURE — G8536 NO DOC ELDER MAL SCRN: HCPCS | Performed by: ORTHOPAEDIC SURGERY

## 2022-07-13 PROCEDURE — G8417 CALC BMI ABV UP PARAM F/U: HCPCS | Performed by: ORTHOPAEDIC SURGERY

## 2022-07-13 PROCEDURE — 1090F PRES/ABSN URINE INCON ASSESS: CPT | Performed by: ORTHOPAEDIC SURGERY

## 2022-07-13 PROCEDURE — 20611 DRAIN/INJ JOINT/BURSA W/US: CPT | Performed by: ORTHOPAEDIC SURGERY

## 2022-07-13 PROCEDURE — 1123F ACP DISCUSS/DSCN MKR DOCD: CPT | Performed by: ORTHOPAEDIC SURGERY

## 2022-07-13 PROCEDURE — G8427 DOCREV CUR MEDS BY ELIG CLIN: HCPCS | Performed by: ORTHOPAEDIC SURGERY

## 2022-07-13 PROCEDURE — G8400 PT W/DXA NO RESULTS DOC: HCPCS | Performed by: ORTHOPAEDIC SURGERY

## 2022-07-13 PROCEDURE — 99213 OFFICE O/P EST LOW 20 MIN: CPT | Performed by: ORTHOPAEDIC SURGERY

## 2022-07-13 PROCEDURE — G8432 DEP SCR NOT DOC, RNG: HCPCS | Performed by: ORTHOPAEDIC SURGERY

## 2022-07-13 PROCEDURE — 1101F PT FALLS ASSESS-DOCD LE1/YR: CPT | Performed by: ORTHOPAEDIC SURGERY

## 2022-07-13 RX ORDER — NABUMETONE 500 MG/1
TABLET, FILM COATED ORAL
COMMUNITY
Start: 2021-07-15

## 2022-07-13 RX ORDER — METFORMIN HYDROCHLORIDE 500 MG/1
500 TABLET, EXTENDED RELEASE ORAL
COMMUNITY
Start: 2021-10-18 | End: 2022-10-18

## 2022-07-13 RX ORDER — CANDESARTAN CILEXETIL AND HYDROCHLOROTHIAZIDE 32; 12.5 MG/1; MG/1
1 TABLET ORAL DAILY
COMMUNITY
Start: 2022-06-28 | End: 2023-06-28

## 2022-07-13 RX ORDER — SPIRONOLACTONE 25 MG/1
1 TABLET ORAL DAILY
COMMUNITY
Start: 2021-07-23 | End: 2023-06-28

## 2022-07-13 RX ORDER — SODIUM, POTASSIUM,MAG SULFATES 17.5-3.13G
SOLUTION, RECONSTITUTED, ORAL ORAL AS DIRECTED
COMMUNITY
Start: 2021-11-09 | End: 2022-11-09

## 2022-07-13 RX ORDER — AMOXICILLIN 500 MG/1
CAPSULE ORAL
COMMUNITY
Start: 2022-01-28

## 2022-07-13 RX ORDER — FEXOFENADINE HCL 60 MG
60 TABLET ORAL
COMMUNITY
Start: 2021-10-25 | End: 2022-10-25

## 2022-07-13 RX ORDER — CANDESARTAN 32 MG/1
TABLET ORAL
COMMUNITY
Start: 2021-07-23

## 2022-07-13 RX ADMIN — TRIAMCINOLONE ACETONIDE 40 MG: 40 INJECTION, SUSPENSION INTRA-ARTICULAR; INTRAMUSCULAR at 14:46

## 2022-07-13 RX ADMIN — LIDOCAINE HYDROCHLORIDE 9 ML: 10 INJECTION INFILTRATION; PERINEURAL at 14:46

## 2022-07-13 NOTE — LETTER
Ronen Gamino   1945   [id number]       7/13/2022       I hereby authorize and direct Nicholas Colorado MD, Gabriele Rausch, and whomever he may designate as his associate to perform upon myself the following procedure:    Injection of: Kenalog, Supartz, Euflexxa, Orthovisc in the Right/Left ____________________. If any unforeseen condition arises in the course of the procedure, I further authorize him and his associated and/or assistant(s) to do whatever he/she deems advisable. The nature, purpose, benefits, risks, side effects, likelihood of achieving goals, and potential problems that might occur during recuperation, risks for not receiving the proposed care, treatment and services and alternatives of the procedure have been fully explained to me by my physician including, but not limited to:    Swelling, joint pain, skin pigment changes, worsening of condition, and failure to improve. I acknowledge that no guarantee or assurance has been made to me as to the results that may be obtained or the likelihood of success.                 _______________________________________     Signature of patient or authorized representative                [employer] and Sports Medicine fax: [phone]

## 2022-07-18 ENCOUNTER — DOCUMENTATION ONLY (OUTPATIENT)
Dept: ENDOCRINOLOGY | Age: 77
End: 2022-07-18

## 2022-07-18 RX ORDER — LIDOCAINE HYDROCHLORIDE 10 MG/ML
9 INJECTION INFILTRATION; PERINEURAL ONCE
Status: COMPLETED | OUTPATIENT
Start: 2022-07-18 | End: 2022-07-13

## 2022-07-18 RX ORDER — TRIAMCINOLONE ACETONIDE 40 MG/ML
40 INJECTION, SUSPENSION INTRA-ARTICULAR; INTRAMUSCULAR ONCE
Status: COMPLETED | OUTPATIENT
Start: 2022-07-18 | End: 2022-07-13

## 2022-07-18 NOTE — PROGRESS NOTES
Name: Wai Hill    : 1945     Service Dept: [facility] and Sports Medicine    Chief Complaint   Patient presents with    Knee Pain        There were no vitals taken for this visit. No Known Allergies     Current Outpatient Medications   Medication Sig Dispense Refill    amoxicillin (AMOXIL) 500 mg capsule       candesartan (ATACAND) 32 mg tablet       candesartan-hydroCHLOROthiazide (ATACAND HCT) 32-12.5 mg per tablet Take 1 Tablet by mouth daily.  fexofenadine (ALLEGRA) 60 mg tablet Take 60 mg by mouth.  metFORMIN ER (GLUCOPHAGE XR) 500 mg tablet Take 500 mg by mouth.  nabumetone (RELAFEN) 500 mg tablet       sodium-potassium-mag sulfate (Suprep Bowel Prep Kit) 17.5-3.13-1.6 gram solr oral solution Take  by mouth as directed.  spironolactone (ALDACTONE) 25 mg tablet Take 1 Tablet by mouth daily.  Trulicity 6.01 QR/2.9 mL sub-q pen       rosuvastatin (CRESTOR) 10 mg tablet         There is no problem list on file for this patient. Family History   Problem Relation Age of Onset    Cancer Mother     Heart Disease Father     Cancer Sister       Social History     Socioeconomic History    Marital status:    Tobacco Use    Smoking status: Never Smoker    Smokeless tobacco: Never Used   Substance and Sexual Activity    Alcohol use: Not Currently      History reviewed. No pertinent surgical history. Past Medical History:   Diagnosis Date    Diabetes ([address]     H/O blood clots     High cholesterol     Hypertension         I have reviewed and agree with [address] [place] [address] Nw and ROS and intake form in chart and the record furthermore I have reviewed prior medical record(s) regarding this patients care during this appointment.      Review of Systems:   Patient is a pleasant appearing individual, appropriately dressed, well hydrated, well nourished, who is alert, appropriately oriented for age, and in no acute distress with a normal gait and normal affect who does not appear to be in any significant pain. Physical Exam:  Left Knee -Decrease range of motion with flexion, Knee arc of greater than 50 degrees, Some crepitation, Grossly neurovascularly intact, Good cap refill, No skin lesion, Moderate swelling, No gross instability, Some quadriceps weakness, Kellgren and Adiel at least grade 3    Right Knee - Full Range of Motion, No crepitation, Grossly neurovascularly intact, Good cap refill, No skin lesion, No swelling, No gross instability, No quadriceps weakness    Procedure Documentation:    I discussed in detail the risks, benefits and complications of an injection which included but are not limited to infection, skin reactions, hot swollen joint, and anaphylaxis with the patient. The patient verbalized understanding and gave informed consent for the injection. The patient's knee was flexed to 90° and the skin prepped using sterile alcohol solution. A sterile needle was inserted into the left knee and the mixture of 9 mL Lidocaine 1%, 1 mL Kenalog 40 mg was injected under sterile technique. The needle was withdrawn and the puncture site sealed with a Band-Aid. Technique: Under sterile conditions a Spor ultrasound unit with a variable frequency (7.0-14.0 MHz) linear transducer was used to localize the placement of needle into the left knee joint. Findings: Successful needle placement for knee injection. Final images were taken and saved for permanent record. The patient tolerated the injection well. The patient was instructed to call the office immediately if there is any pain, redness, warmth, fever, or chills. Encounter Diagnoses     ICD-10-CM ICD-9-CM   1. Left knee pain, unspecified chronicity  M25.562 719.46   2. Primary osteoarthritis of left knee  M17.12 715.16         HPI:  The patient is here with a chief complaint of left knee pain. Dull, throbbing pain. Progressively getting worse. Pain is 8/10.   She is scheduled for knee replacement. X-rays of the left knee are positive for OA. Assessment/Plan: We will get her setup for a cortisone injection in the meantime and go from there. As part of continued conservative pain management options the patient was advised to utilize Tylenol or OTC NSAIDS as long as it is not medically contraindicated. Return to Office:    PRN     Scribed by Marycruz Garduno MD as dictated by Marina Pineda. Raul Grace MD.  Documentation True and Accepted Nicholas Grace MD

## 2022-07-18 NOTE — PATIENT INSTRUCTIONS
Knee Arthritis: Care Instructions  Your Care Instructions     Knee arthritis is a breakdown of the cartilage that cushions your knee joint. When the cartilage wears down, your bones rub against each other. This causes pain and stiffness. Knee arthritis tends to get worse with time. Treatment for knee arthritis involves reducing pain, making the leg muscles stronger, and staying at a healthy body weight. The treatment usually does not improve the health of the cartilage, but it can reduce pain and improve how well your knee works. You can take simple measures to protect your knee joints, ease your pain, and help you stay active. Follow-up care is a key part of your treatment and safety. Be sure to make and go to all appointments, and call your doctor if you are having problems. It's also a good idea to know your test results and keep a list of the medicines you take. How can you care for yourself at home? · Know that knee arthritis will cause more pain on some days than on others. · Stay at a healthy weight. Lose weight if you are overweight. When you stand up, the pressure on your knees from every pound of body weight is multiplied four times. So if you lose 10 pounds, you will reduce the pressure on your knees by 40 pounds. · Talk to your doctor or physical therapist about exercises that will help ease joint pain. ? Stretch to help prevent stiffness and to prevent injury before you exercise. You may enjoy gentle forms of yoga to help keep your knee joints and muscles flexible. ? Walk instead of jog.  ? Ride a bike. This makes your thigh muscles stronger and takes pressure off your knee. ? Wear well-fitting and comfortable shoes. ? Exercise in chest-deep water. This can help you exercise longer with less pain. ? Avoid exercises that include squatting or kneeling. They can put a lot of strain on your knees.   ? Talk to your doctor to make sure that the exercise you do is not making the arthritis worse.  · Do not sit for long periods of time. Try to walk once in a while to keep your knee from getting stiff. · Ask your doctor or physical therapist whether shoe inserts may reduce your arthritis pain. · If you can afford it, get new athletic shoes at least every year. This can help reduce the strain on your knees. · Use a device to help you do everyday activities. ? A cane or walking stick can help you keep your balance when you walk. Hold the cane or walking stick in the hand opposite the painful knee. ? If you feel like you may fall when you walk, try using crutches or a front-wheeled walker. These can prevent falls that could cause more damage to your knee. ? A knee brace may help keep your knee stable and prevent pain. ? You also can use other things to make life easier, such as a higher toilet seat and handrails in the bathtub or shower. · Take pain medicines exactly as directed. ? Do not wait until you are in severe pain. You will get better results if you take it sooner. ? If you are not taking a prescription pain medicine, take an over-the-counter medicine such as acetaminophen (Tylenol), ibuprofen (Advil, Motrin), or naproxen (Aleve). Read and follow all instructions on the label. ? Do not take two or more pain medicines at the same time unless the doctor told you to. Many pain medicines have acetaminophen, which is Tylenol. Too much acetaminophen (Tylenol) can be harmful. ? Tell your doctor if you take a blood thinner, have diabetes, or have allergies to shellfish. · Ask your doctor if you might benefit from a shot of steroid medicine into your knee. This may provide pain relief for several months. · Many people take the supplements glucosamine and chondroitin for osteoarthritis. Some people feel they help, but the medical research does not show that they work. Talk to your doctor before you take these supplements. When should you call for help?    Call your doctor now or seek immediate medical care if:    · You have sudden swelling, warmth, or pain in your knee.     · You have knee pain and a fever or rash.     · You have such bad pain that you cannot use your knee. Watch closely for changes in your health, and be sure to contact your doctor if you have any problems. Where can you learn more? Go to http://veronica-jeremy.info/  Enter W187 in the search box to learn more about \"Knee Arthritis: Care Instructions. \"  Current as of: December 20, 2021               Content Version: 13.2  © 6100-3589 [employer]. Care instructions adapted under license by [employer] (which disclaims liability or warranty for this information). If you have questions about a medical condition or this instruction, always ask your healthcare professional. [address] any warranty or liability for your use of this information.

## 2022-07-18 NOTE — PROGRESS NOTES
Inform Tameka foot and ankle  That  I did not do foot exam at may visit and I cannot fill the diabetic forms until nov 2022   Do not send papers in between visits   Send papers at the time of the visit with us   She has next appt in nov 2022     Ask them to inform pt also

## 2022-10-26 DIAGNOSIS — E11.65 TYPE 2 DIABETES MELLITUS WITH HYPERGLYCEMIA, WITHOUT LONG-TERM CURRENT USE OF INSULIN (HCC): Primary | ICD-10-CM

## 2022-10-26 RX ORDER — METFORMIN HYDROCHLORIDE 500 MG/1
1000 TABLET, EXTENDED RELEASE ORAL 2 TIMES DAILY WITH MEALS
Qty: 360 TABLET | Refills: 3 | Status: SHIPPED | OUTPATIENT
Start: 2022-10-26

## 2022-10-31 ENCOUNTER — HOSPITAL ENCOUNTER (OUTPATIENT)
Dept: MAMMOGRAPHY | Age: 77
Discharge: HOME | End: 2022-10-31
Attending: INTERNAL MEDICINE
Payer: MEDICARE

## 2022-10-31 DIAGNOSIS — M81.0 SENILE OSTEOPOROSIS: ICD-10-CM

## 2022-10-31 PROCEDURE — 77080 DXA BONE DENSITY AXIAL: CPT

## 2022-11-02 ENCOUNTER — OFFICE VISIT (OUTPATIENT)
Dept: ENDOCRINOLOGY | Age: 77
End: 2022-11-02
Payer: MEDICARE

## 2022-11-02 VITALS
DIASTOLIC BLOOD PRESSURE: 60 MMHG | HEIGHT: 66 IN | WEIGHT: 208.8 LBS | SYSTOLIC BLOOD PRESSURE: 116 MMHG | HEART RATE: 74 BPM | TEMPERATURE: 98.1 F | BODY MASS INDEX: 33.56 KG/M2 | OXYGEN SATURATION: 96 %

## 2022-11-02 DIAGNOSIS — E11.65 TYPE 2 DIABETES MELLITUS WITH HYPERGLYCEMIA, WITHOUT LONG-TERM CURRENT USE OF INSULIN (HCC): ICD-10-CM

## 2022-11-02 DIAGNOSIS — E11.65 TYPE 2 DIABETES MELLITUS WITH HYPERGLYCEMIA, WITHOUT LONG-TERM CURRENT USE OF INSULIN (HCC): Primary | ICD-10-CM

## 2022-11-02 DIAGNOSIS — E66.01 SEVERE OBESITY (HCC): ICD-10-CM

## 2022-11-02 DIAGNOSIS — I10 ESSENTIAL HYPERTENSION: ICD-10-CM

## 2022-11-02 DIAGNOSIS — M81.0 SENILE OSTEOPOROSIS: ICD-10-CM

## 2022-11-02 DIAGNOSIS — E78.2 MIXED HYPERLIPIDEMIA: ICD-10-CM

## 2022-11-02 PROCEDURE — G8417 CALC BMI ABV UP PARAM F/U: HCPCS | Performed by: INTERNAL MEDICINE

## 2022-11-02 PROCEDURE — G8536 NO DOC ELDER MAL SCRN: HCPCS | Performed by: INTERNAL MEDICINE

## 2022-11-02 PROCEDURE — 3078F DIAST BP <80 MM HG: CPT | Performed by: INTERNAL MEDICINE

## 2022-11-02 PROCEDURE — G8754 DIAS BP LESS 90: HCPCS | Performed by: INTERNAL MEDICINE

## 2022-11-02 PROCEDURE — 99214 OFFICE O/P EST MOD 30 MIN: CPT | Performed by: INTERNAL MEDICINE

## 2022-11-02 PROCEDURE — 1101F PT FALLS ASSESS-DOCD LE1/YR: CPT | Performed by: INTERNAL MEDICINE

## 2022-11-02 PROCEDURE — G8427 DOCREV CUR MEDS BY ELIG CLIN: HCPCS | Performed by: INTERNAL MEDICINE

## 2022-11-02 PROCEDURE — 1090F PRES/ABSN URINE INCON ASSESS: CPT | Performed by: INTERNAL MEDICINE

## 2022-11-02 PROCEDURE — 3044F HG A1C LEVEL LT 7.0%: CPT | Performed by: INTERNAL MEDICINE

## 2022-11-02 PROCEDURE — G8752 SYS BP LESS 140: HCPCS | Performed by: INTERNAL MEDICINE

## 2022-11-02 PROCEDURE — 1123F ACP DISCUSS/DSCN MKR DOCD: CPT | Performed by: INTERNAL MEDICINE

## 2022-11-02 PROCEDURE — G8510 SCR DEP NEG, NO PLAN REQD: HCPCS | Performed by: INTERNAL MEDICINE

## 2022-11-02 PROCEDURE — 3074F SYST BP LT 130 MM HG: CPT | Performed by: INTERNAL MEDICINE

## 2022-11-02 RX ORDER — DULAGLUTIDE 0.75 MG/.5ML
0.75 INJECTION, SOLUTION SUBCUTANEOUS
Qty: 6 ML | Refills: 3 | Status: SHIPPED | OUTPATIENT
Start: 2022-11-02

## 2022-11-02 NOTE — PATIENT INSTRUCTIONS
SPECIFIC INSTRUCTIONS BELOW     metformin ER  500 mg  2  Pills    Twice a day with meals     Stay  on trulicity 7.21 mg a week       ---------------------------------------------------------      Prolia   twice a year     OR     Reclast once a  year         -------------PAY ATTENTION TO THESE GENERAL INSTRUCTIONS -----------------      - The medications prescribed at this visit will not be available at pharmacy until 6 pm       - YOUR MED LIST IS NOT UP TO DATE AS SOME CHANGES ARE BEING MADE AFTER THE VISIT - FOLLOW SPECIFIC INSTRUCTIONS  ABOVE     -ANY tests other than blood work, which you opt to do  outside the  [facility] imaging facilities, you are responsible for prior authorizations if  required    - [address] UP TO DATE ON YOUR AVS- PLEASE IGNORE     Results     *Normal results will not be notified by a phone call starting January 1 2021   *If you have an upcoming visit, the results will be discussed at the visit   *Please sign up for MY CHART if you want access to your lab and test results  *Abnormal results which require immediate attention will be notified by phone call   *Abnormal results which do not require immediate assistance will be notified in 1-2 weeks       Refills    -    have your pharmacy send us a refill request . Refills are done max for one year and a visit is a must before refills are extended    Follow up appointments -  highly encourage you to make it when you are checking out. We can accommodate you into the schedule based on your clinical situation, but not for extending refills beyond a year. Labs are important to give refills and is important to get labs before the visit     Phone calls  -  Allow  24 hrs.  for non-urgent calls to be returned  Prior authorization - It may take 2-4 weeks to process  Forms  -  FMLA, DMV etc., will take up to 2 weeks to process  Cancellations - please notify the office 2 days in advance   Samples  - will only be dispensed at visits       If not showing for the appointments and cancelling appointments within 24 hours are kept track of and three  of such situations in  two consecutive years will likely be considered for termination from the practice    -------------------------------------------------------------------------------------------------------------------

## 2022-11-02 NOTE — PROGRESS NOTES
HISTORY OF PRESENT ILLNESS  Hansa Landaverde is a 69 y.o. female. HPI  Patient here for  6 month   F/u after last  visit of Type 2 diabetes mellitus  And osteoporosis  From May 2022       Patient is hesitant to go for  left knee replacement , for time being she got an inj  and she is better   She had right knee replaced and suffered from  DVTs     Lost 2 lbs         May 2022     Lost 20 lbs since start of trulicity   She is planning for knee surgery      Pt says she was found to have low magnesium and was given OTC magnesium which bother her stomach she says   She had colonoscopy the day before     She had CT scan without contrast   from feb 2022 at [facility] for abdominal pain and tenderness , 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 She never liked this medicaiton  Current monitoring regimen: home blood tests - daily         Review of Systems   Constitutional: Negative. Knee joint pains   Psychiatric/Behavioral: Negative for depression and memory loss. The patient does not have insomnia. Physical Exam   Constitutional: She is oriented to person, place, and time. She appears well-developed and well-nourished. Left knee swollen   Psychiatric: She has a normal mood and affect.          Diabetic feet exam :  nov 2022     H/o partial or complete amputation of foot : N  H/o previous foot ulceration ; N  H/o pre - ulcerative callus : Y  H/o peripheral neuropathy and callus : Y  H/o poor circulation     STANISLAW   : N  Foot deformity : Y bilateral buinons, hammer toes            Lab Results   Component Value Date/Time    Hemoglobin A1c 5.9 (H) 10/26/2022 10:19 AM    Hemoglobin A1c 6.2 (H) 04/11/2022 12:00 AM    Hemoglobin A1c 7.4 (H) 10/07/2020 09:18 AM    Hemoglobin A1c, External 7.0 01/20/2022 12:00 AM    Glucose 127 (H) 10/26/2022 10:19 AM    Microalbumin/Creat ratio (mg/g creat) 7 10/26/2022 10:19 AM    Microalbumin,urine random 1.05 10/26/2022 10:19 AM    LDL, calculated 66 10/26/2022 10:19 AM    Creatinine 0.74 10/26/2022 10:19 AM      Lab Results   Component Value Date/Time    Cholesterol, total 146 10/26/2022 10:19 AM    HDL Cholesterol 45 10/26/2022 10:19 AM    LDL, calculated 66 10/26/2022 10:19 AM    Triglyceride 175 (H) 10/26/2022 10:19 AM    CHOL/HDL Ratio 3.2 10/26/2022 10:19 AM     Lab Results   Component Value Date/Time    ALT (SGPT) 18 10/26/2022 10:19 AM    Alk. phosphatase 48 10/26/2022 10:19 AM    Bilirubin, total 1.5 (H) 10/26/2022 10:19 AM    Albumin 4.0 10/26/2022 10:19 AM    Protein, total 6.9 10/26/2022 10:19 AM     Lab Results   Component Value Date/Time    GFR est non-AA >60 10/18/2021 09:42 AM    GFR est AA >60 10/18/2021 09:42 AM    Creatinine 0.74 10/26/2022 10:19 AM    BUN 17 10/26/2022 10:19 AM    Sodium 139 10/26/2022 10:19 AM    Potassium 4.6 10/26/2022 10:19 AM    Chloride 103 10/26/2022 10:19 AM    CO2 30 10/26/2022 10:19 AM              ASSESSMENT and PLAN    1. Type 2 DM uncontrolled : a1c is  5.9 %    from  october 2022    compared to    6.2 %    From  April 2022   Compared to    8.1 %    From  Today   October 2021   Compared to   7.4 %     From     Oct  2020   Compared  To   6.8 %     FROM OCT 2019   COMPARED TO    6.7 %    From  Feb 2018 Nov 2022     good glycemic control    Stay on trulicity  And metformin er  2 pills bid     April 2022      Improved glycemic control  With start of trulicity   Stay on trulicity  And metformin er  2 pills bid   She is suffering from constipation       2. Hypoglycemia :  Educated on treating the hypoglycemia. 3. HTN : continue aldactone  25 mg bid  and atacand  32 , she takes only half pill a day  . She was stopped from HCTZ for mild renal failure      4.   Dyslipidemia :  On  crestor at 10 mg hs         5. Low BMD  :      Date : march 2014   Bone DEXA  ap spine T-score 0.0; left femoral Neck T- score -2.0, right femoral neck T-score -0.5  Date : March 102016   Bone DEXA  ap spine T-score 0.3; left femoral Neck T- score  -1.5, right femoral neck T-score-1.3  Date :  March 2018  Bone DEXA    ap spine T-score 0.4; left femoral Neck T- score  -1.5, right femoral neck T-score -1.4  Date : oct 22 2022    Bone DEXA  ap spine T-score 0.2; left femoral Neck T- score  -1.7, right femoral neck T-score-1.4    As compared to the prior study, there has been a significant 6.5% decrease in the left total hip 5% decrease in the region. There is discrepant significant 2.1% increase in lumbar spine      Tried  fosamax but  pt could not tolerate it   Suggesting use of reclast  or prolia and she could not make up her mind   She needs to be on calcium and vit d supplementation      7. Obesity : Body mass index is 33.7 kg/m². She has   TO  diet significantly    8. Mild CKD - resolved     9. FIBROMYALGIA- ON CYMBALTA FROM PCP       10. She has  GILBERT SYNDROME       11. Hypomagnesemia  :  Chronic  ( she had surgeries on her kidneys  ) - left kidney  Saved from aneurysm per pt     12. 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 H/o DVT     14.  Neuropathy - mild - c/o tingling numbness         Did the comprehensive foot exam today   I am treating the patient [facility] comprehensive plan of care for diabetes   The patient would benefit from diabetic foot wear to protect their feet           F/u in 6 months     Reviewed results with patient and discussed the labs being ordered today/bnv  Patient voiced understanding of plan of care

## 2022-11-02 NOTE — LETTER
11/6/2022    Patient: Jim Pugh   YOB: 1945   Date of Visit: 11/2/2022     Malathi Johnson NP  [address] [place] [address] [address] [address]  Via Fax: [phone]    Dear Malathi Johnson NP,      Thank you for referring Ms. Jim Pugh to [address] [place] [address] for evaluation. My notes for this consultation are attached. If you have questions, please do not hesitate to call me. I look forward to following your patient along with you.       Sincerely,    Jose Roberto Montano MD

## 2022-11-21 ENCOUNTER — TELEPHONE (OUTPATIENT)
Dept: ENDOCRINOLOGY | Age: 77
End: 2022-11-21

## 2022-11-21 NOTE — TELEPHONE ENCOUNTER
[facility] foot specialist is calling patient to advise we denied her diabetic shoes. She states she did have an exam. Please see if paper work received. I reviewed with patient that I did not see where she was denied this it may just not be completed yet.        Linda [phone]

## 2022-11-21 NOTE — TELEPHONE ENCOUNTER
Called [facility] and spoke with Rocky. Advised Rocky that Dr Teofilo Wolf does not have the forms. Rocky states that she will fax the forms.

## 2022-11-22 ENCOUNTER — OFFICE VISIT (OUTPATIENT)
Dept: ORTHOPEDIC SURGERY | Age: 77
End: 2022-11-22
Payer: MEDICARE

## 2022-11-22 DIAGNOSIS — M17.12 PRIMARY OSTEOARTHRITIS OF LEFT KNEE: Primary | ICD-10-CM

## 2022-11-22 DIAGNOSIS — M25.561 RIGHT KNEE PAIN, UNSPECIFIED CHRONICITY: ICD-10-CM

## 2022-11-22 PROCEDURE — G8417 CALC BMI ABV UP PARAM F/U: HCPCS | Performed by: ORTHOPAEDIC SURGERY

## 2022-11-22 PROCEDURE — 99214 OFFICE O/P EST MOD 30 MIN: CPT | Performed by: ORTHOPAEDIC SURGERY

## 2022-11-22 PROCEDURE — G8399 PT W/DXA RESULTS DOCUMENT: HCPCS | Performed by: ORTHOPAEDIC SURGERY

## 2022-11-22 PROCEDURE — G8756 NO BP MEASURE DOC: HCPCS | Performed by: ORTHOPAEDIC SURGERY

## 2022-11-22 PROCEDURE — G8427 DOCREV CUR MEDS BY ELIG CLIN: HCPCS | Performed by: ORTHOPAEDIC SURGERY

## 2022-11-22 PROCEDURE — 1090F PRES/ABSN URINE INCON ASSESS: CPT | Performed by: ORTHOPAEDIC SURGERY

## 2022-11-22 PROCEDURE — 1101F PT FALLS ASSESS-DOCD LE1/YR: CPT | Performed by: ORTHOPAEDIC SURGERY

## 2022-11-22 PROCEDURE — G8536 NO DOC ELDER MAL SCRN: HCPCS | Performed by: ORTHOPAEDIC SURGERY

## 2022-11-22 PROCEDURE — G8432 DEP SCR NOT DOC, RNG: HCPCS | Performed by: ORTHOPAEDIC SURGERY

## 2022-11-22 PROCEDURE — 1123F ACP DISCUSS/DSCN MKR DOCD: CPT | Performed by: ORTHOPAEDIC SURGERY

## 2022-11-22 RX ORDER — MUPIROCIN 20 MG/G
OINTMENT TOPICAL
COMMUNITY
Start: 2022-09-13

## 2022-11-22 RX ORDER — DICLOFENAC SODIUM 10 MG/G
2 GEL TOPICAL 4 TIMES DAILY
Qty: 200 G | Refills: 2 | Status: SHIPPED | OUTPATIENT
Start: 2022-11-22

## 2022-11-22 RX ORDER — SULFAMETHOXAZOLE AND TRIMETHOPRIM 800; 160 MG/1; MG/1
TABLET ORAL
COMMUNITY
Start: 2022-09-09

## 2022-11-22 RX ORDER — FLUCONAZOLE 150 MG/1
TABLET ORAL
COMMUNITY
Start: 2022-09-13

## 2022-11-22 RX ORDER — AMOXICILLIN AND CLAVULANATE POTASSIUM 875; 125 MG/1; MG/1
TABLET, FILM COATED ORAL
COMMUNITY
Start: 2022-09-09

## 2022-11-22 NOTE — LETTER
11/23/2022    Patient: Margy Segura   YOB: 1945   Date of Visit: 11/22/2022     Cori Frances NP  [address] [place] [address] [address] [address]  Via Fax: [phone]    Dear Cori Frances NP,      Thank you for referring Ms. Margy Segura to [address] [place] [address] for evaluation. My notes for this consultation are attached. If you have questions, please do not hesitate to call me. I look forward to following your patient along with you.       Sincerely,    Wil Villanueva MD

## 2022-11-22 NOTE — PATIENT INSTRUCTIONS
Knee Pain or Injury: Care Instructions  Overview     Injuries are a common cause of knee problems. Sudden (acute) injuries may be caused by a direct blow to the knee. They can also be caused by abnormal twisting, bending, or falling on the knee. Pain, bruising, or swelling may be severe, and may start within minutes of the injury. Overuse is another cause of knee pain. Other causes are climbing stairs, kneeling, and other activities that use the knee. Everyday wear and tear, especially as you get older, also can cause knee pain. Rest, along with home treatment, often relieves pain and allows your knee to heal. If you have a serious knee injury, you may need tests and treatment. Follow-up care is a key part of your treatment and safety. Be sure to make and go to all appointments, and call your doctor if you are having problems. It's also a good idea to know your test results and keep a list of the medicines you take. How can you care for yourself at home? Be safe with medicines. Read and follow all instructions on the label. If the doctor gave you a prescription medicine for pain, take it as prescribed. If you are not taking a prescription pain medicine, ask your doctor if you can take an over-the-counter medicine. Rest and protect your knee. Take a break from any activity that may cause pain. Put ice or a cold pack on your knee for 10 to 20 minutes at a time. Put a thin cloth between the ice and your skin. Prop up a sore knee on a pillow when you ice it or anytime you sit or lie down for the next 3 days. Try to keep it above the level of your heart. This will help reduce swelling. If your knee is not swollen, you can put moist heat, a heating pad, or a warm cloth on your knee. If your doctor recommends an elastic bandage, sleeve, or other type of support for your knee, wear it as directed. Follow your doctor's instructions about how much weight you can put on your leg.  Use a cane, crutches, or a walker as instructed. Follow your doctor's instructions about activity during your healing process. If you can do mild exercise, slowly increase your activity. Stay at a healthy weight. Extra weight can strain the joints, especially the knees and hips, and make the pain worse. Losing a few pounds may help. When should you call for help? Call 911 anytime you think you may need emergency care. For example, call if:    You have symptoms of a blood clot in your lung (called a pulmonary embolism). These may include:  Sudden chest pain. Trouble breathing. Coughing up blood. Call your doctor now or seek immediate medical care if:    You have severe or increasing pain. Your leg or foot turns cold or changes color. You cannot stand or put weight on your knee. Your knee looks twisted or bent out of shape. You cannot move your knee. You have signs of infection, such as: Increased pain, swelling, warmth, or redness. Red streaks leading from the knee. Pus draining from a place on your knee. A fever. You have signs of a blood clot in your leg (called a deep vein thrombosis), such as:  Pain in your calf, back of the knee, thigh, or groin. Redness and swelling in your leg or groin. Watch closely for changes in your health, and be sure to contact your doctor if:    You have tingling, weakness, or numbness in your knee. You have any new symptoms, such as swelling. You have bruises from a knee injury that last longer than 2 weeks. You do not get better as expected. Where can you learn more? Go to http://www.gray.com/  Enter K195 in the search box to learn more about \"Knee Pain or Injury: Care Instructions. \"  Current as of: March 9, 2022               Content Version: 13.4  © 1582-6080 [employer]. Care instructions adapted under license by [email or web address] (which disclaims liability or warranty for this information).  If you have questions about a medical condition or this instruction, always ask your healthcare professional. [place] [address] any warranty or liability for your use of this information.

## 2022-11-22 NOTE — PROGRESS NOTES
Name: Coco Goodson    : 1945     Service Dept: [address] [place] [address] Sports Medicine    Chief Complaint   Patient presents with    Knee Pain        There were no vitals taken for this visit. No Known Allergies     Current Outpatient Medications   Medication Sig Dispense Refill    diclofenac (VOLTAREN) 1 % gel Apply 2 g to affected area four (4) times daily. Apply 2 grams to affected area 4x a day 200 g 2    amoxicillin-clavulanate (AUGMENTIN) 875-125 mg per tablet       docosahexanoic acid-eicosapent 120-180 mg cap Take 1,000 mg by mouth daily. fluconazole (DIFLUCAN) 150 mg tablet take 1 tablet by mouth AS A ONE TIME DOSE      mupirocin (BACTROBAN) 2 % ointment apply 1 APPLICATION topically twice a day for 7 days      trimethoprim-sulfamethoxazole (BACTRIM DS, SEPTRA DS) 160-800 mg per tablet       dulaglutide (Trulicity) 2.63 HV/0.5 mL sub-q pen 0.5 mL by SubCUTAneous route every seven (7) days. 6 mL 3    metFORMIN ER (GLUCOPHAGE XR) 500 mg tablet Take 2 Tablets by mouth two (2) times daily (with meals). 360 Tablet 3    amoxicillin (AMOXIL) 500 mg capsule       candesartan-hydroCHLOROthiazide (ATACAND HCT) 32-12.5 mg per tablet Take 1 Tablet by mouth daily. rosuvastatin (CRESTOR) 10 mg tablet Take 10 mg by mouth nightly. acetaminophen (TYLENOL) 650 mg TbER Take 650 mg by mouth every eight (8) hours. MAGNESIUM PO Take  by mouth. gummy      fexofenadine-pseudoephedrine (ALLEGRA-D)  mg Tb12 Take 1 Tablet by mouth. glucose blood VI test strips (FreeStyle Lite Strips) strip Test once daily. Dx code E11.65 100 Strip 4    lancets misc Test once daily. Dx code E11.65 100 Each 4    Blood-Glucose Meter (FreeStyle Lite Meter) monitoring kit Use to check blood glucose once daily. E11.65 1 Kit 0    spironolactone (ALDACTONE) 25 mg tablet Take 1 Tab by mouth two (2) times a day.  Stop 50 mg dose 60 Tab 6    GLUCOSAMINE HCL/CHONDR RODRIGUES A NA (OSTEO BI-FLEX PO) Take  by mouth.      b complex vitamins tablet Take 1 Tab by mouth daily. aspirin delayed-release 81 mg tablet Take  by mouth daily. calcium citrate-vitamin D3 (CITRACAL WITH VITAMIN D MAXIMUM) tablet Take  by mouth two (2) times a day. ASCORBATE CALCIUM (COLLINS-C PO) Take  by mouth. omega-3 fatty acids-vitamin e 1,000 mg cap Take 1 Cap by mouth. TURMERIC ROOT EXTRACT PO Take  by mouth. Patient Active Problem List   Diagnosis Code    Type 2 diabetes mellitus with hyperglycemia, without long-term current use of insulin ([facility] [address] E11.65    Essential hypertension I10    Mixed hyperlipidemia E78.2    BMI 36.0-36.9,adult Z68.36    Severe obesity (HCC) E66.01      Family History   Problem Relation Age of Onset    Cancer Mother     Heart Disease Father     Cancer Sister     Hypertension Father       Social History     Socioeconomic History    Marital status: LEGALLY    Tobacco Use    Smoking status: Never    Smokeless tobacco: Never   Substance and Sexual Activity    Alcohol use: Not Currently   Social History Narrative    ** Merged History Encounter **           Past Surgical History:   Procedure Laterality Date    HX CHOLECYSTECTOMY  1976    HX KNEE REPLACEMENT Right 2012      Past Medical History:   Diagnosis Date    Diabetes ([facility] [address]     H/O blood clots     High cholesterol     HTN (hypertension)     Hypertension     Muscle ache     Muscle pain     Stiff joint         I have reviewed and agree with [address] [place] [address] Nw and ROS and intake form in chart and the record furthermore I have reviewed prior medical record(s) regarding this patients care during this appointment. Review of Systems:   Patient is a pleasant appearing individual, appropriately dressed, well hydrated, well nourished, who is alert, appropriately oriented for age, and in no acute distress with a normal gait and normal affect who does not appear to be in any significant pain.     Physical Exam:  Right knee - Neurovascularly intact with good cap refill, full range of motion and full strength, well healed incision noted, no swelling, no erythema, no instability. Left knee - Decrease range of motion with flexion, Some crepitation, Grossly neurovascularly intact, Good cap refill, No skin lesion, Moderate swelling, No gross instability, Some quadriceps weakness    Encounter Diagnoses     ICD-10-CM ICD-9-CM   1. Primary osteoarthritis of left knee  M17.12 715.16   2. Right knee pain, unspecified chronicity  M25.561 719.46       HPI:  The patient is here with a chief complaint of bilateral knee pain, throbbing, burning pain. It is a lot better. Pain is 7/10. X-rays of the bilateral knees are positive right knee with well-placed prosthesis. Assessment/Plan:  Left knee, she is set up for knee replacement. She has got a history of blood clots once she gets her medical clearance. We will also not use tourniquet and go from there. Voltaren gel in the meantime. As part of continued conservative pain management options the patient was advised to utilize Tylenol or OTC NSAIDS as long as it is not medically contraindicated. Return to Office: Follow-up and Dispositions    Return if symptoms worsen or fail to improve. Scribed by Frankey Lutes, LPN as dictated by [facility] Beverly HIEU Israel MD.  Documentation True and Accepted Nicholas Israel MD

## 2023-04-19 ENCOUNTER — OFFICE VISIT (OUTPATIENT)
Age: 78
End: 2023-04-19
Payer: MEDICARE

## 2023-04-19 VITALS — WEIGHT: 208 LBS | BODY MASS INDEX: 33.43 KG/M2 | HEIGHT: 66 IN

## 2023-04-19 DIAGNOSIS — M17.12 PRIMARY OSTEOARTHRITIS OF LEFT KNEE: Primary | ICD-10-CM

## 2023-04-19 PROCEDURE — 20611 DRAIN/INJ JOINT/BURSA W/US: CPT | Performed by: NURSE PRACTITIONER

## 2023-04-19 RX ORDER — LIDOCAINE HYDROCHLORIDE 10 MG/ML
9 INJECTION, SOLUTION INFILTRATION; PERINEURAL ONCE
Status: COMPLETED | OUTPATIENT
Start: 2023-04-19 | End: 2023-04-19

## 2023-04-19 RX ORDER — TRIAMCINOLONE ACETONIDE 40 MG/ML
40 INJECTION, SUSPENSION INTRA-ARTICULAR; INTRAMUSCULAR ONCE
Status: COMPLETED | OUTPATIENT
Start: 2023-04-19 | End: 2023-04-19

## 2023-04-19 RX ADMIN — TRIAMCINOLONE ACETONIDE 40 MG: 40 INJECTION, SUSPENSION INTRA-ARTICULAR; INTRAMUSCULAR at 11:57

## 2023-04-19 RX ADMIN — LIDOCAINE HYDROCHLORIDE 9 ML: 10 INJECTION, SOLUTION INFILTRATION; PERINEURAL at 11:56

## 2023-04-19 NOTE — PATIENT INSTRUCTIONS
You have knee pain and a fever or rash. You have such bad pain that you cannot use your knee. Watch closely for changes in your health, and be sure to contact your doctor if you have any problems. Where can you learn more? Go to http://www.woods.com/ and enter W187 to learn more about \"Knee Arthritis: Care Instructions. \"  Current as of: March 9, 2022               Content Version: 13.5  © 2006-2022 Healthwise, [employer]. Care instructions adapted under license by [facility] ([facility]). If you have questions about a medical condition or this instruction, always ask your healthcare professional. [address] any warranty or liability for your use of this information.

## 2023-04-19 NOTE — PROGRESS NOTES
Procedure Documentation:    I discussed in detail the risks, benefits and complications of an injection which included but are not limited to infection, skin reactions, hot swollen joint, and anaphylaxis with the patient. The patient verbalized understanding and gave informed consent for the injection. The patient's left knee was flexed to 90° and the skin prepped using sterile alcohol solution. A sterile needle was inserted into the left knee and the mixture of 9 mL Lidocaine 1%, 1 mL Kenalog 40 mg was injected under sterile technique. The needle was withdrawn and the puncture site sealed with a Band-Aid. Technique: Under sterile conditions a [employer] ultrasound unit with a variable frequency (7.0-14.0 MHz) linear transducer was used to localize the placement of needle into the left knee joint. Findings: Successful needle placement for knee injection. Final images were taken and saved for permanent record. The patient tolerated the injection well. The patient was instructed to call the office immediately if there is any pain, redness, warmth, fever, or chills.

## 2023-07-10 DIAGNOSIS — E11.65 TYPE 2 DIABETES MELLITUS WITH HYPERGLYCEMIA, WITHOUT LONG-TERM CURRENT USE OF INSULIN (HCC): Primary | ICD-10-CM

## 2023-07-10 RX ORDER — METFORMIN HYDROCHLORIDE 500 MG/1
1000 TABLET, EXTENDED RELEASE ORAL 2 TIMES DAILY WITH MEALS
Qty: 180 TABLET | Refills: 3 | Status: SHIPPED | OUTPATIENT
Start: 2023-07-10

## 2023-07-10 NOTE — TELEPHONE ENCOUNTER
Damian refill for metformin patient needs clarification on how she is taking damian filled 500 twice a day she though it was 2 tabs twice a day and unclear of what strength should be.

## 2023-10-25 ENCOUNTER — NURSE ONLY (OUTPATIENT)
Age: 78
End: 2023-10-25

## 2023-10-25 DIAGNOSIS — E11.65 TYPE 2 DIABETES MELLITUS WITH HYPERGLYCEMIA, WITHOUT LONG-TERM CURRENT USE OF INSULIN (HCC): Primary | ICD-10-CM

## 2023-10-25 DIAGNOSIS — E11.65 TYPE 2 DIABETES MELLITUS WITH HYPERGLYCEMIA, WITHOUT LONG-TERM CURRENT USE OF INSULIN (HCC): ICD-10-CM

## 2023-10-25 DIAGNOSIS — M81.0 AGE-RELATED OSTEOPOROSIS WITHOUT CURRENT PATHOLOGICAL FRACTURE: ICD-10-CM

## 2023-10-25 DIAGNOSIS — E78.2 MIXED HYPERLIPIDEMIA: ICD-10-CM

## 2023-10-25 LAB
ALBUMIN SERPL-MCNC: 4.2 G/DL (ref 3.5–5)
ALBUMIN/GLOB SERPL: 1.6 (ref 1.1–2.2)
ALP SERPL-CCNC: 56 U/L (ref 45–117)
ALT SERPL-CCNC: 20 U/L (ref 12–78)
ANION GAP SERPL CALC-SCNC: 5 MMOL/L (ref 5–15)
AST SERPL-CCNC: 14 U/L (ref 15–37)
BILIRUB SERPL-MCNC: 1.6 MG/DL (ref 0.2–1)
BUN SERPL-MCNC: 18 MG/DL (ref 6–20)
BUN/CREAT SERPL: 26 (ref 12–20)
CALCIUM SERPL-MCNC: 9.3 MG/DL (ref 8.5–10.1)
CHLORIDE SERPL-SCNC: 105 MMOL/L (ref 97–108)
CHOLEST SERPL-MCNC: 147 MG/DL
CO2 SERPL-SCNC: 30 MMOL/L (ref 21–32)
CREAT SERPL-MCNC: 0.69 MG/DL (ref 0.55–1.02)
CREAT UR-MCNC: 154 MG/DL
EST. AVERAGE GLUCOSE BLD GHB EST-MCNC: 146 MG/DL
GLOBULIN SER CALC-MCNC: 2.7 G/DL (ref 2–4)
GLUCOSE SERPL-MCNC: 113 MG/DL (ref 65–100)
HBA1C MFR BLD: 6.7 % (ref 4–5.6)
HDLC SERPL-MCNC: 50 MG/DL
HDLC SERPL: 2.9 (ref 0–5)
LDLC SERPL CALC-MCNC: 62 MG/DL (ref 0–100)
MICROALBUMIN UR-MCNC: 1.57 MG/DL
MICROALBUMIN/CREAT UR-RTO: 10 MG/G (ref 0–30)
POTASSIUM SERPL-SCNC: 4 MMOL/L (ref 3.5–5.1)
PROT SERPL-MCNC: 6.9 G/DL (ref 6.4–8.2)
SODIUM SERPL-SCNC: 140 MMOL/L (ref 136–145)
TRIGL SERPL-MCNC: 175 MG/DL
VLDLC SERPL CALC-MCNC: 35 MG/DL

## 2023-11-01 ENCOUNTER — OFFICE VISIT (OUTPATIENT)
Age: 78
End: 2023-11-01
Payer: MEDICARE

## 2023-11-01 VITALS
OXYGEN SATURATION: 97 % | HEIGHT: 66 IN | WEIGHT: 226.4 LBS | TEMPERATURE: 98.1 F | RESPIRATION RATE: 17 BRPM | HEART RATE: 75 BPM | SYSTOLIC BLOOD PRESSURE: 134 MMHG | BODY MASS INDEX: 36.38 KG/M2 | DIASTOLIC BLOOD PRESSURE: 53 MMHG

## 2023-11-01 DIAGNOSIS — E11.65 TYPE 2 DIABETES MELLITUS WITH HYPERGLYCEMIA, WITHOUT LONG-TERM CURRENT USE OF INSULIN (HCC): Primary | ICD-10-CM

## 2023-11-01 DIAGNOSIS — E78.2 MIXED HYPERLIPIDEMIA: ICD-10-CM

## 2023-11-01 DIAGNOSIS — E66.01 MORBID (SEVERE) OBESITY DUE TO EXCESS CALORIES (HCC): ICD-10-CM

## 2023-11-01 DIAGNOSIS — I10 ESSENTIAL (PRIMARY) HYPERTENSION: ICD-10-CM

## 2023-11-01 PROCEDURE — 99214 OFFICE O/P EST MOD 30 MIN: CPT | Performed by: INTERNAL MEDICINE

## 2023-11-01 PROCEDURE — G8484 FLU IMMUNIZE NO ADMIN: HCPCS | Performed by: INTERNAL MEDICINE

## 2023-11-01 PROCEDURE — G8417 CALC BMI ABV UP PARAM F/U: HCPCS | Performed by: INTERNAL MEDICINE

## 2023-11-01 PROCEDURE — G8399 PT W/DXA RESULTS DOCUMENT: HCPCS | Performed by: INTERNAL MEDICINE

## 2023-11-01 PROCEDURE — 1090F PRES/ABSN URINE INCON ASSESS: CPT | Performed by: INTERNAL MEDICINE

## 2023-11-01 PROCEDURE — G8427 DOCREV CUR MEDS BY ELIG CLIN: HCPCS | Performed by: INTERNAL MEDICINE

## 2023-11-01 PROCEDURE — 3044F HG A1C LEVEL LT 7.0%: CPT | Performed by: INTERNAL MEDICINE

## 2023-11-01 PROCEDURE — 3074F SYST BP LT 130 MM HG: CPT | Performed by: INTERNAL MEDICINE

## 2023-11-01 PROCEDURE — 1036F TOBACCO NON-USER: CPT | Performed by: INTERNAL MEDICINE

## 2023-11-01 PROCEDURE — 1123F ACP DISCUSS/DSCN MKR DOCD: CPT | Performed by: INTERNAL MEDICINE

## 2023-11-01 PROCEDURE — 3078F DIAST BP <80 MM HG: CPT | Performed by: INTERNAL MEDICINE

## 2023-11-01 RX ORDER — DULAGLUTIDE 1.5 MG/.5ML
INJECTION, SOLUTION SUBCUTANEOUS
Qty: 6 ML | Refills: 2 | Status: SHIPPED | OUTPATIENT
Start: 2023-11-01

## 2023-11-01 RX ORDER — CANDESARTAN 32 MG/1
16 TABLET ORAL DAILY
COMMUNITY
Start: 2023-08-21

## 2023-11-01 NOTE — PATIENT INSTRUCTIONS
SPECIFIC INSTRUCTIONS BELOW     Stay on trulicity  And   metformin er  2 pills twice a day with meals         -------------PAY ATTENTION TO THESE GENERAL INSTRUCTIONS -----------------      - The medications prescribed at this visit will not be available at pharmacy until 6 pm       - YOUR MED LIST IS NOT UP TO DATE AS SOME CHANGES ARE BEING MADE AFTER THE VISIT - FOLLOW SPECIFIC INSTRUCTIONS  ABOVE     -ANY tests other than blood work, which you opt to do  outside the  [facility] imaging facilities, you are responsible for prior authorizations if  required    - [address] [place] [address] Drive AVS- PLEASE IGNORE     Results     *Normal results will not be notified by a phone call starting January 1 2021   *If you have an upcoming visit, the results will be discussed at the visit   *Please sign up for MY CHART if you want access to your lab and test results  *Abnormal results which require immediate attention will be notified by phone call   *Abnormal results which do not require immediate assistance will be notified in 1-2 weeks       Refills    -    have your pharmacy send us a refill request . Refills are done max for one year and a visit is a must before refills are extended    Follow up appointments -  highly encourage you to make it when you are checking out. We can accommodate you into the schedule based on your clinical situation, but not for extending refills beyond a year. Labs are important to give refills and is important to get labs before the visit     Phone calls  -  Allow  24 hrs.  for non-urgent calls to be returned  Prior authorization - It may take 2-4 weeks to process  Forms  -  FMLA, DMV etc., will take up to 2 weeks to process  Cancellations - please notify the office 2 days in advance   Samples  - will only be dispensed at visits       If not showing for the appointments and cancelling appointments within 24 hours are kept track of and three  of such situations in  two

## 2023-11-01 NOTE — PROGRESS NOTES
Esther Cabot ,MD FACE     HISTORY OF PRESENT ILLNESS  Axel Cota is a 69 y.o. female. HPI  Patient here for  6 month   F/u after last  visit of Type 2 diabetes mellitus  And osteoporosis  From Nov 2022   H/o pulmonary emboli       Patient is hesitant to go for  left knee replacement   Gained 18 lbs         Nov 2023  Patient is hesitant to go for  left knee replacement , for time being she got an inj  and she is better   She had right knee replaced and suffered from  DVTs   Lost 2 lbs         May 2022     Lost 20 lbs since start of trulicity   She is planning for knee surgery    Pt says she was found to have low magnesium and was given OTC magnesium which bother her stomach she says   She had colonoscopy the day before   She had CT scan without contrast   from feb 2022 at [facility] for abdominal pain and tenderness , 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 She never liked this medicaiton  Current monitoring regimen: home blood tests - daily         Review of Systems   Constitutional: Negative. Knee joint pains   Psychiatric/Behavioral: Negative for depression and memory loss. The patient does not have insomnia. Physical Exam   Constitutional: She is oriented to person, place, and time. She appears well-developed and well-nourished. Left knee swollen   Psychiatric: She has a normal mood and affect.        Labs    Diabetes    Lab Results   Component Value Date    LABA1C 6.7 (H) 10/25/2023    LABA1C 5.9 (H) 10/26/2022    LABA1C 6.2 (H) 04/11/2022       Lab Results   Component Value Date     10/25/2023    K 4.0 10/25/2023     10/25/2023    CO2 30 10/25/2023    BUN 18 10/25/2023    CREATININE

## 2024-01-11 DIAGNOSIS — E11.65 TYPE 2 DIABETES MELLITUS WITH HYPERGLYCEMIA, WITHOUT LONG-TERM CURRENT USE OF INSULIN (HCC): ICD-10-CM

## 2024-01-11 RX ORDER — METFORMIN HYDROCHLORIDE 500 MG/1
1000 TABLET, EXTENDED RELEASE ORAL 2 TIMES DAILY WITH MEALS
Qty: 360 TABLET | Refills: 3 | Status: SHIPPED | OUTPATIENT
Start: 2024-01-11

## 2024-01-24 ENCOUNTER — TELEPHONE (OUTPATIENT)
Age: 79
End: 2024-01-24

## 2024-01-24 NOTE — TELEPHONE ENCOUNTER
Attempted to call. Unsuccessful. Left detailed msg for Mariela Coulter informing her metformin was sent in the same dosage as before, no changes was made. Pt to contact pharmacy. A callback number was left in case of any question or concern.

## 2024-01-24 NOTE — TELEPHONE ENCOUNTER
Attempted to call. Unsuccessful. Left msg for Mariela Coulter to give us a call back at the office. A callback number was left.

## 2024-01-24 NOTE — TELEPHONE ENCOUNTER
Pt came in office to say pharmacy will not fill the Metformin prescription because the dosage is wrong. Please call patient and leave message on voice mail. [phone].

## 2024-01-24 NOTE — TELEPHONE ENCOUNTER
Pt stated metformin was sent to pharmacy for 2 tablets BID but she is only taking 1 tablet BID. Pharmacy won't fill med. How do you want pt to take rx?

## 2024-01-24 NOTE — TELEPHONE ENCOUNTER
Mandy,  Patient called back she advised to please call home number as indicated thank you [phone] (Home)

## 2024-01-25 ENCOUNTER — TELEPHONE (OUTPATIENT)
Age: 79
End: 2024-01-25

## 2024-01-25 DIAGNOSIS — E11.65 TYPE 2 DIABETES MELLITUS WITH HYPERGLYCEMIA, WITHOUT LONG-TERM CURRENT USE OF INSULIN (HCC): ICD-10-CM

## 2024-01-25 RX ORDER — METFORMIN HYDROCHLORIDE 500 MG/1
TABLET, EXTENDED RELEASE ORAL
Qty: 180 TABLET | Refills: 3 | Status: SHIPPED | OUTPATIENT
Start: 2024-01-25

## 2024-01-25 NOTE — TELEPHONE ENCOUNTER
I called the pt and the pharmacy the medication is available the pharmacy .I encouraged her to increase to 4 pills a day if she could even though I am aware she takes 2 pills a day very good kidney function in oct 2023 .     Pt stated she only want to take 1 tab BID .

## 2024-01-25 NOTE — TELEPHONE ENCOUNTER
Pt and I had gone thru this conversation several times     I encouraged her to  increase to 4  pills a day if she could even though I am aware she takes 2 pills a day       As the number of pills are higher than  what she takes,  the pharmacy should nto give any objection for refills  ( this way she goes to pharmacy less number of times )     She had very good kidney function in oct 2023 and pharmacy does not have to intervene        Bouchra Castro MD

## 2024-02-03 ENCOUNTER — HOSPITAL ENCOUNTER (EMERGENCY)
Facility: HOSPITAL | Age: 79
Discharge: HOME | End: 2024-02-03
Attending: EMERGENCY MEDICINE
Payer: MEDICARE

## 2024-02-03 ENCOUNTER — APPOINTMENT (OUTPATIENT)
Facility: HOSPITAL | Age: 79
End: 2024-02-03
Payer: MEDICARE

## 2024-02-03 VITALS
OXYGEN SATURATION: 98 % | RESPIRATION RATE: 18 BRPM | BODY MASS INDEX: 35 KG/M2 | SYSTOLIC BLOOD PRESSURE: 143 MMHG | WEIGHT: 223 LBS | HEIGHT: 67 IN | TEMPERATURE: 97.5 F | DIASTOLIC BLOOD PRESSURE: 77 MMHG | HEART RATE: 100 BPM

## 2024-02-03 DIAGNOSIS — M54.50 ACUTE RIGHT-SIDED LOW BACK PAIN, UNSPECIFIED WHETHER SCIATICA PRESENT: Primary | ICD-10-CM

## 2024-02-03 PROCEDURE — 72100 X-RAY EXAM L-S SPINE 2/3 VWS: CPT

## 2024-02-03 PROCEDURE — 6370000000 HC RX 637 (ALT 250 FOR IP): Performed by: EMERGENCY MEDICINE

## 2024-02-03 PROCEDURE — 99283 EMERGENCY DEPT VISIT LOW MDM: CPT

## 2024-02-03 RX ORDER — CYCLOBENZAPRINE HCL 5 MG
5 TABLET ORAL 2 TIMES DAILY PRN
Qty: 10 TABLET | Refills: 0 | Status: SHIPPED | OUTPATIENT
Start: 2024-02-03 | End: 2024-02-04 | Stop reason: ALTCHOICE

## 2024-02-03 RX ORDER — METHYLPREDNISOLONE 4 MG/1
TABLET ORAL
Qty: 1 KIT | Refills: 0 | Status: SHIPPED | OUTPATIENT
Start: 2024-02-03 | End: 2024-02-03

## 2024-02-03 RX ORDER — METHYLPREDNISOLONE 4 MG/1
TABLET ORAL
Qty: 1 KIT | Refills: 0 | Status: SHIPPED | OUTPATIENT
Start: 2024-02-03

## 2024-02-03 RX ORDER — ACETAMINOPHEN 500 MG
1000 TABLET ORAL EVERY 8 HOURS PRN
Qty: 18 TABLET | Refills: 0 | Status: SHIPPED | OUTPATIENT
Start: 2024-02-03

## 2024-02-03 RX ORDER — ACETAMINOPHEN 500 MG
1000 TABLET ORAL EVERY 8 HOURS PRN
Qty: 18 TABLET | Refills: 0 | Status: SHIPPED | OUTPATIENT
Start: 2024-02-03 | End: 2024-02-03

## 2024-02-03 RX ORDER — TRAMADOL HYDROCHLORIDE 50 MG/1
50 TABLET ORAL ONCE
Status: DISCONTINUED | OUTPATIENT
Start: 2024-02-03 | End: 2024-02-03

## 2024-02-03 RX ORDER — CYCLOBENZAPRINE HCL 5 MG
5 TABLET ORAL 2 TIMES DAILY PRN
Qty: 10 TABLET | Refills: 0 | Status: SHIPPED | OUTPATIENT
Start: 2024-02-03 | End: 2024-02-03

## 2024-02-03 RX ORDER — LIDOCAINE 4 G/G
1 PATCH TOPICAL ONCE
Status: DISCONTINUED | OUTPATIENT
Start: 2024-02-03 | End: 2024-02-03 | Stop reason: HOSPADM

## 2024-02-03 RX ORDER — LIDOCAINE 4 G/G
1 PATCH TOPICAL DAILY
Qty: 7 PATCH | Refills: 0 | Status: SHIPPED | OUTPATIENT
Start: 2024-02-03 | End: 2024-02-10

## 2024-02-03 RX ORDER — LIDOCAINE 4 G/G
1 PATCH TOPICAL DAILY
Qty: 7 PATCH | Refills: 0 | Status: SHIPPED | OUTPATIENT
Start: 2024-02-03 | End: 2024-02-03

## 2024-02-03 RX ORDER — PREDNISONE 20 MG/1
40 TABLET ORAL
Status: COMPLETED | OUTPATIENT
Start: 2024-02-03 | End: 2024-02-03

## 2024-02-03 RX ORDER — ACETAMINOPHEN 325 MG/1
650 TABLET ORAL
Status: DISCONTINUED | OUTPATIENT
Start: 2024-02-03 | End: 2024-02-03 | Stop reason: HOSPADM

## 2024-02-03 RX ADMIN — PREDNISONE 40 MG: 20 TABLET ORAL at 12:35

## 2024-02-03 ASSESSMENT — PAIN DESCRIPTION - DESCRIPTORS: DESCRIPTORS: ACHING

## 2024-02-03 ASSESSMENT — PAIN SCALES - GENERAL: PAINLEVEL_OUTOF10: 10

## 2024-02-03 ASSESSMENT — PAIN - FUNCTIONAL ASSESSMENT: PAIN_FUNCTIONAL_ASSESSMENT: 0-10

## 2024-02-03 ASSESSMENT — ENCOUNTER SYMPTOMS: BACK PAIN: 1

## 2024-02-03 ASSESSMENT — PAIN DESCRIPTION - LOCATION: LOCATION: BACK

## 2024-02-03 NOTE — ED TRIAGE NOTES
Pt states she hurt her back last week working in the yard, states she went to the chiropractor yesterday for an adjustment and states after being done with treatment she was feeling worse.

## 2024-02-03 NOTE — ED PROVIDER NOTES
well-perfused with strong PT pulses.   Skin:     General: Skin is warm and dry.   Neurological:      Mental Status: She is alert.         DIAGNOSTIC RESULTS     EKG: All EKG's are interpreted by the Emergency Department Physician who either signs or Co-signs this chart in the absence of a cardiologist.        RADIOLOGY:   Non-plain film images such as CT, Ultrasound and MRI are read by the radiologist. Plain radiographic images are visualized and preliminarily interpreted by the emergency physician with the below findings:        Interpretation per the Radiologist below, if available at the time of this note:    XR LUMBAR SPINE (2-3 VIEWS)   Final Result   No acute pathology. Multilevel degenerative changes.                       LABS:  Labs Reviewed - No data to display    All other labs were within normal range or not returned as of this dictation.    EMERGENCY DEPARTMENT COURSE and DIFFERENTIAL DIAGNOSIS/MDM:   Vitals:    Vitals:    02/03/24 1151   BP: (!) 143/77   Pulse: 100   Resp: 18   Temp: 97.5 °F (36.4 °C)   TempSrc: Oral   SpO2: 98%   Weight: 101.2 kg (223 lb)   Height: 1.702 m (5' 7\")           Medical Decision Making  78-year-old female presenting with low back pain that happened after working in the yard about a week ago, exacerbated by chiropractic manipulation.  Plain films to further assess, no repeat symptoms persist cauda equina, seems to have some mild radiculopathy symptoms with radiation of pain more to the right.  Otherwise no bowel or bladder dysfunction.  Plain films are negative.  Discussed treatment with Medrol Dosepak, lidocaine patches, she takes Aleve at home, discussed proper dosing of Tylenol and also muscle relaxant.  Follow-up with orthopedics.  Return for concerns or worsening symptoms    Amount and/or Complexity of Data Reviewed  Radiology: ordered.    Risk  OTC drugs.  Prescription drug management.            REASSESSMENT          Medications Administered         lidocaine 4 %  external patch 1 patch Admin Date  02/03/2024 Action  Patch Applied Dose  1 patch Route  TransDERmal Administered By  Diamond Pompa RN        predniSONE (DELTASONE) tablet 40 mg Admin Date  02/03/2024 Action  Given Dose  40 mg Route  Oral Administered By  Diamond Pompa RN            CONSULTS:  None    PROCEDURES:  Unless otherwise noted below, none     Procedures      FINAL IMPRESSION      1. Acute right-sided low back pain, unspecified whether sciatica present          DISPOSITION/PLAN   DISPOSITION Decision To Discharge 02/03/2024 01:03:29 PM      PATIENT REFERRED TO:  [facility]  [address] [facility] [place] [address]  [phone]  Schedule an appointment as soon as possible for a visit         DISCHARGE MEDICATIONS:  Current Discharge Medication List        START taking these medications    Details   acetaminophen (TYLENOL) 500 MG tablet Take 2 tablets by mouth every 8 hours as needed for Pain  Qty: 18 tablet, Refills: 0      cyclobenzaprine (FLEXERIL) 5 MG tablet Take 1 tablet by mouth 2 times daily as needed for Muscle spasms  Qty: 10 tablet, Refills: 0      diclofenac sodium (VOLTAREN) 1 % GEL Apply 2 g topically 4 times daily as needed for Pain  Qty: 50 g, Refills: 0      lidocaine 4 % external patch Place 1 patch onto the skin daily for 7 days Apply patchy for 12 hours, then remove for 12 hours prior to next application  Qty: 7 patch, Refills: 0      methylPREDNISolone (MEDROL DOSEPACK) 4 MG tablet Take by mouth as directed on packaging  Qty: 1 kit, Refills: 0               (Please note that portions of this note were completed with a voice recognition program.  Efforts were made to edit the dictations but occasionally words are mis-transcribed.)    Seun Valenzuela MD (electronically signed)  Emergency Medicine Attending Physician             Seun Valenzuela MD  02/03/24 1728

## 2024-02-04 ENCOUNTER — HOSPITAL ENCOUNTER (EMERGENCY)
Facility: HOSPITAL | Age: 79
Discharge: HOME | End: 2024-02-04
Attending: EMERGENCY MEDICINE
Payer: MEDICARE

## 2024-02-04 VITALS
OXYGEN SATURATION: 96 % | HEART RATE: 87 BPM | DIASTOLIC BLOOD PRESSURE: 82 MMHG | TEMPERATURE: 97.8 F | RESPIRATION RATE: 16 BRPM | SYSTOLIC BLOOD PRESSURE: 120 MMHG

## 2024-02-04 DIAGNOSIS — S39.012A STRAIN OF LUMBAR REGION, INITIAL ENCOUNTER: Primary | ICD-10-CM

## 2024-02-04 PROCEDURE — 6360000002 HC RX W HCPCS: Performed by: EMERGENCY MEDICINE

## 2024-02-04 PROCEDURE — 99284 EMERGENCY DEPT VISIT MOD MDM: CPT

## 2024-02-04 PROCEDURE — 96372 THER/PROPH/DIAG INJ SC/IM: CPT

## 2024-02-04 RX ORDER — TIZANIDINE 4 MG/1
4 TABLET ORAL 3 TIMES DAILY PRN
Qty: 15 TABLET | Refills: 0 | Status: SHIPPED | OUTPATIENT
Start: 2024-02-04 | End: 2024-02-09

## 2024-02-04 RX ORDER — TIZANIDINE 4 MG/1
4 TABLET ORAL 3 TIMES DAILY PRN
Qty: 15 TABLET | Refills: 0 | Status: SHIPPED | OUTPATIENT
Start: 2024-02-04 | End: 2024-02-04

## 2024-02-04 RX ORDER — KETOROLAC TROMETHAMINE 30 MG/ML
30 INJECTION, SOLUTION INTRAMUSCULAR; INTRAVENOUS ONCE
Status: COMPLETED | OUTPATIENT
Start: 2024-02-04 | End: 2024-02-04

## 2024-02-04 RX ADMIN — KETOROLAC TROMETHAMINE 30 MG: 30 INJECTION INTRAMUSCULAR; INTRAVENOUS at 14:49

## 2024-02-04 ASSESSMENT — PAIN DESCRIPTION - DESCRIPTORS: DESCRIPTORS: ACHING

## 2024-02-04 ASSESSMENT — PAIN DESCRIPTION - LOCATION: LOCATION: LEG

## 2024-02-04 ASSESSMENT — PAIN DESCRIPTION - ORIENTATION: ORIENTATION: RIGHT

## 2024-02-04 ASSESSMENT — PAIN SCALES - GENERAL: PAINLEVEL_OUTOF10: 10

## 2024-02-04 ASSESSMENT — PAIN - FUNCTIONAL ASSESSMENT: PAIN_FUNCTIONAL_ASSESSMENT: 0-10

## 2024-02-04 NOTE — ED TRIAGE NOTES
Pt arrives stating that she is here for a follow up from yesterday, states the pain in her RIGHT leg is no better

## 2024-02-04 NOTE — DISCHARGE INSTRUCTIONS
You have been evaluated in the Emergency Department today for back pain. Your evaluation did not find evidence of medical conditions requiring emergent intervention at this time.    You may take acetaminophen (Tylenol), either 3 regular strength (325mg) tablets or 2 extra-strength (500mg) tablets every 6 hours as well as an anti-inflammatory, either prescribed (Voltaren, Mobic) or over-the-counter (2 naproxen aka Aleve every 12 hours OR 3 ibuprofen aka Motrin every 6 hours) as needed for pain. Taking both the Tylenol as well as anti-inflammatory medications in combination can be especially effective.    Avoid lifting/twisting over the next 48 hours.   Attempt to move and stretch as able.  Apply ice every 6-8 hours for 20 minutes as needed for pain.     Return to the Emergency Department if you experience worsening pain, numbness, tingling, change of color in your limbs, or any other concerning symptoms.

## 2024-02-13 ENCOUNTER — TELEPHONE (OUTPATIENT)
Age: 79
End: 2024-02-13

## 2024-02-13 DIAGNOSIS — E11.65 TYPE 2 DIABETES MELLITUS WITH HYPERGLYCEMIA, WITH LONG-TERM CURRENT USE OF INSULIN (HCC): ICD-10-CM

## 2024-02-13 DIAGNOSIS — Z79.4 TYPE 2 DIABETES MELLITUS WITH HYPERGLYCEMIA, WITH LONG-TERM CURRENT USE OF INSULIN (HCC): ICD-10-CM

## 2024-02-13 DIAGNOSIS — E11.65 TYPE 2 DIABETES MELLITUS WITH HYPERGLYCEMIA, WITHOUT LONG-TERM CURRENT USE OF INSULIN (HCC): Primary | ICD-10-CM

## 2024-02-13 DIAGNOSIS — E66.01 MORBID (SEVERE) OBESITY DUE TO EXCESS CALORIES (HCC): ICD-10-CM

## 2024-02-13 RX ORDER — DULAGLUTIDE 0.75 MG/.5ML
0.75 INJECTION, SOLUTION SUBCUTANEOUS WEEKLY
Qty: 6 ML | Refills: 3 | Status: SHIPPED | OUTPATIENT
Start: 2024-02-13

## 2024-02-14 ENCOUNTER — TRANSCRIBE ORDERS (OUTPATIENT)
Facility: HOSPITAL | Age: 79
End: 2024-02-14

## 2024-02-14 DIAGNOSIS — M54.16 LUMBAR RADICULOPATHY: Primary | ICD-10-CM

## 2024-02-28 ENCOUNTER — HOSPITAL ENCOUNTER (OUTPATIENT)
Facility: HOSPITAL | Age: 79
Discharge: HOME | End: 2024-03-02
Payer: MEDICARE

## 2024-02-28 DIAGNOSIS — M54.16 LUMBAR RADICULITIS: ICD-10-CM

## 2024-02-28 PROCEDURE — 72148 MRI LUMBAR SPINE W/O DYE: CPT

## 2024-03-01 NOTE — FLOWSHEET NOTE
Patient called PAT;  patient states she is having back pain and is under the care of Dr. Daniel at the [facility]; where she will be getting a nerve block on 3/6/2024. Patient instructed to notify Dr. Bell's office.    Placed call to Shabana at Dr. Bell's office.  Left voice message of the above information and requested a call back to PAT to inform as to if patient is to remain on surgery schedule for 3/18/24; so that PAT can be scheduled accordingly.

## 2024-03-01 NOTE — FLOWSHEET NOTE
Received message from Shabana at [facility] office.  Surgery is to remain on schedule for 3/18/24 and PAT may be scheduled per Shabana.

## 2024-03-02 NOTE — H&P
Mariela Coulter was referred for evaluation by:Dr. Amor Bell for Pre- Op Evaluation.  Please see encounter details and orders for consultative summary.    Type of surgery : Left Total Knee Arthroplasty, [facility]  Surgery site : Left knee  Anesthesia type: Spinal  Date of procedure:  3/18/2024    This 78 y.o. year old female presents with complaints of left knee pain for several years.  Reports she needed to have this knee replaced years ago but was hesitant because she developed pulmonary emboli after right total knee replacement in 2012.  Conservative measures including medication management, activity modification, physical therapy and injection therapy have been exhausted prior to the decision for surgery. Patient has discussed the risks, alternatives, and benefits of the surgery with surgeon and has elected to proceed with surgical intervention.    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  Follows right renal aneurysm with serial CT scans and sees Vascular specialist annually.  Has been stable.    Developed pulmonary embolism after right total knee replacement in 2012 and was on anticoagulants for 6 months.  Has not had any repeat of PE or any DVTs.    PCP: Jorden Vargas NP    Allergies:   Allergies   Allergen Reactions    Morphine Other (See Comments)     \"Made me hot and agitated\"    Valium [Diazepam] Anxiety     Latex allergy: no  Prior reactions to anesthesia:  sense of awareness and early awakening during right knee replacement surgery.     Current Outpatient Medications   Medication Sig    naproxen sodium (ALEVE) 220 MG tablet Take 1 tablet by mouth 2 times daily as needed for Pain    COLLAGEN PO Take 1 Scoop by mouth every morning    Calcium-Magnesium-Vitamin D (CITRACAL SLOW RELEASE PO) Take 1,200 mg by mouth every morning    pregabalin (LYRICA) 25 MG capsule Take 1 capsule by mouth nightly.    Black Pepper-Turmeric 5-1000 MG CAPS Take 1 capsule by mouth

## 2024-03-04 ENCOUNTER — HOSPITAL ENCOUNTER (OUTPATIENT)
Facility: HOSPITAL | Age: 79
Discharge: HOME | End: 2024-03-07
Payer: MEDICARE

## 2024-03-04 VITALS
SYSTOLIC BLOOD PRESSURE: 138 MMHG | WEIGHT: 212.8 LBS | DIASTOLIC BLOOD PRESSURE: 65 MMHG | TEMPERATURE: 97.1 F | HEIGHT: 66 IN | BODY MASS INDEX: 34.2 KG/M2 | HEART RATE: 84 BPM | RESPIRATION RATE: 18 BRPM

## 2024-03-04 LAB
ALBUMIN SERPL-MCNC: 4.2 G/DL (ref 3.5–5)
ALBUMIN/GLOB SERPL: 1.4 (ref 1.1–2.2)
ALP SERPL-CCNC: 64 U/L (ref 45–117)
ALT SERPL-CCNC: 16 U/L (ref 12–78)
ANION GAP SERPL CALC-SCNC: 6 MMOL/L (ref 5–15)
APPEARANCE UR: CLEAR
AST SERPL-CCNC: 12 U/L (ref 15–37)
BACTERIA URNS QL MICRO: ABNORMAL /HPF
BASOPHILS # BLD: 0.1 K/UL (ref 0–0.1)
BASOPHILS NFR BLD: 1 % (ref 0–1)
BILIRUB SERPL-MCNC: 2 MG/DL (ref 0.2–1)
BILIRUB UR QL: NEGATIVE
BUN SERPL-MCNC: 15 MG/DL (ref 6–20)
BUN/CREAT SERPL: 23 (ref 12–20)
CALCIUM SERPL-MCNC: 10 MG/DL (ref 8.5–10.1)
CHLORIDE SERPL-SCNC: 104 MMOL/L (ref 97–108)
CO2 SERPL-SCNC: 28 MMOL/L (ref 21–32)
COLOR UR: YELLOW
CREAT SERPL-MCNC: 0.65 MG/DL (ref 0.55–1.02)
CRP SERPL-MCNC: <0.29 MG/DL (ref 0–0.3)
DIFFERENTIAL METHOD BLD: ABNORMAL
EOSINOPHIL # BLD: 0.2 K/UL (ref 0–0.4)
EOSINOPHIL NFR BLD: 2 % (ref 0–7)
EPITH CASTS URNS QL MICRO: ABNORMAL /LPF
ERYTHROCYTE [DISTWIDTH] IN BLOOD BY AUTOMATED COUNT: 14.6 % (ref 11.5–14.5)
ERYTHROCYTE [SEDIMENTATION RATE] IN BLOOD: 45 MM/HR (ref 0–30)
EST. AVERAGE GLUCOSE BLD GHB EST-MCNC: 143 MG/DL
GLOBULIN SER CALC-MCNC: 3 G/DL (ref 2–4)
GLUCOSE SERPL-MCNC: 107 MG/DL (ref 65–100)
GLUCOSE UR STRIP.AUTO-MCNC: NEGATIVE MG/DL
HBA1C MFR BLD: 6.6 % (ref 4–5.6)
HCT VFR BLD AUTO: 40.8 % (ref 35–47)
HGB BLD-MCNC: 13 G/DL (ref 11.5–16)
HGB UR QL STRIP: NEGATIVE
HYALINE CASTS URNS QL MICRO: ABNORMAL /LPF (ref 0–2)
IMM GRANULOCYTES # BLD AUTO: 0.1 K/UL (ref 0–0.04)
IMM GRANULOCYTES NFR BLD AUTO: 1 % (ref 0–0.5)
KETONES UR QL STRIP.AUTO: ABNORMAL MG/DL
LEUKOCYTE ESTERASE UR QL STRIP.AUTO: ABNORMAL
LYMPHOCYTES # BLD: 3.3 K/UL (ref 0.8–3.5)
LYMPHOCYTES NFR BLD: 36 % (ref 12–49)
MCH RBC QN AUTO: 27.8 PG (ref 26–34)
MCHC RBC AUTO-ENTMCNC: 31.9 G/DL (ref 30–36.5)
MCV RBC AUTO: 87.4 FL (ref 80–99)
MONOCYTES # BLD: 0.7 K/UL (ref 0–1)
MONOCYTES NFR BLD: 7 % (ref 5–13)
NEUTS SEG # BLD: 4.9 K/UL (ref 1.8–8)
NEUTS SEG NFR BLD: 53 % (ref 32–75)
NITRITE UR QL STRIP.AUTO: POSITIVE
NRBC # BLD: 0 K/UL (ref 0–0.01)
NRBC BLD-RTO: 0 PER 100 WBC
PH UR STRIP: 6.5 (ref 5–8)
PLATELET # BLD AUTO: 321 K/UL (ref 150–400)
PMV BLD AUTO: 10.3 FL (ref 8.9–12.9)
POTASSIUM SERPL-SCNC: 4.1 MMOL/L (ref 3.5–5.1)
PROT SERPL-MCNC: 7.2 G/DL (ref 6.4–8.2)
PROT UR STRIP-MCNC: NEGATIVE MG/DL
RBC # BLD AUTO: 4.67 M/UL (ref 3.8–5.2)
RBC #/AREA URNS HPF: ABNORMAL /HPF (ref 0–5)
SODIUM SERPL-SCNC: 138 MMOL/L (ref 136–145)
SP GR UR REFRACTOMETRY: 1.02 (ref 1–1.03)
URINE CULTURE IF INDICATED: ABNORMAL
UROBILINOGEN UR QL STRIP.AUTO: 1 EU/DL (ref 0.2–1)
WBC # BLD AUTO: 9.2 K/UL (ref 3.6–11)
WBC URNS QL MICRO: ABNORMAL /HPF (ref 0–4)

## 2024-03-04 PROCEDURE — 36415 COLL VENOUS BLD VENIPUNCTURE: CPT

## 2024-03-04 PROCEDURE — 93005 ELECTROCARDIOGRAM TRACING: CPT | Performed by: ORTHOPAEDIC SURGERY

## 2024-03-04 PROCEDURE — 87086 URINE CULTURE/COLONY COUNT: CPT

## 2024-03-04 PROCEDURE — 87186 SC STD MICRODIL/AGAR DIL: CPT

## 2024-03-04 PROCEDURE — APPNB30 APP NON BILLABLE TIME 0-30 MINS: Performed by: NURSE PRACTITIONER

## 2024-03-04 PROCEDURE — 80053 COMPREHEN METABOLIC PANEL: CPT

## 2024-03-04 PROCEDURE — 83036 HEMOGLOBIN GLYCOSYLATED A1C: CPT

## 2024-03-04 PROCEDURE — 84466 ASSAY OF TRANSFERRIN: CPT

## 2024-03-04 PROCEDURE — 85652 RBC SED RATE AUTOMATED: CPT

## 2024-03-04 PROCEDURE — 86140 C-REACTIVE PROTEIN: CPT

## 2024-03-04 PROCEDURE — 87077 CULTURE AEROBIC IDENTIFY: CPT

## 2024-03-04 PROCEDURE — 85025 COMPLETE CBC W/AUTO DIFF WBC: CPT

## 2024-03-04 PROCEDURE — 81001 URINALYSIS AUTO W/SCOPE: CPT

## 2024-03-04 RX ORDER — NAPROXEN SODIUM 220 MG
220 TABLET ORAL 2 TIMES DAILY PRN
COMMUNITY

## 2024-03-04 RX ORDER — DULAGLUTIDE 0.75 MG/.5ML
0.75 INJECTION, SOLUTION SUBCUTANEOUS WEEKLY
COMMUNITY

## 2024-03-04 RX ORDER — VITAMIN B COMPLEX
1 CAPSULE ORAL EVERY MORNING
COMMUNITY

## 2024-03-04 RX ORDER — PREGABALIN 25 MG/1
25 CAPSULE ORAL NIGHTLY
COMMUNITY

## 2024-03-04 ASSESSMENT — ENCOUNTER SYMPTOMS
ABDOMINAL PAIN: 0
VOMITING: 0
TROUBLE SWALLOWING: 0
SHORTNESS OF BREATH: 0
SORE THROAT: 0
NAUSEA: 0
COUGH: 0
WHEEZING: 0
BLOOD IN STOOL: 0

## 2024-03-04 NOTE — PERIOP NOTE
As per MD Maldonado pt to receive Q30 min vital signs. MD called at [phone] and reports ok for pt to go to US without BP monitoring. Pt normotensive prior to going to US. [facility]                   [address] [place] [address]   MAIN OR                                  [phone]   MAIN PRE OP                          [phone]                                                                                AMBULATORY PRE OP          [phone]  PRE-ADMISSION TESTING    [phone]   Surgery Date:  Monday 3/18/24       Is surgery arrival time given by surgeon?  NO  If “NO”, [facility] staff will call you between 3 and 7pm the day before your surgery with your arrival time. (If your surgery is on a Monday, we will call you the Friday before.)    Call [phone] after 7pm Monday-Friday if you did not receive this call.    INSTRUCTIONS BEFORE YOUR SURGERY   When You  Arrive Arrive at the 2nd Floor Admitting Desk on the day of your surgery  Have your insurance card, photo ID, and any copayment (if needed)   Food   and   Drink NO food or drink after midnight the night before surgery    This means NO water, gum, mints, coffee, juice, etc.  No alcohol (beer, wine, liquor) 24 hours before and after surgery   Medications to   TAKE   Morning of Surgery MEDICATIONS TO TAKE THE MORNING OF SURGERY WITH A SIP OF WATER:      Medications  To  STOP      7 days before surgery Non-Steroidal anti-inflammatory Drugs (NSAID's): for example, Ibuprofen (Advil, Motrin), Naproxen (Aleve)  Herbal supplements, vitamins, and fish oil  Other:  (Pain medications not listed above, including Tylenol may be taken)   Blood  Thinners If you take  Aspirin, Plavix, Coumadin, or any blood-thinning or anti-blood clot medicine, talk to the doctor who prescribed the medications for pre-operative instructions.  We will call your cardiologist and request instructions on when to stop taking your aspirin before surgery and then we will call you with these instructions.   Bathing Clothing  Jewelry  Valuables     If you shower the morning of surgery, please do not apply

## 2024-03-05 ENCOUNTER — TELEPHONE (OUTPATIENT)
Age: 79
End: 2024-03-05

## 2024-03-05 PROBLEM — Z01.818 ENCOUNTER FOR PREADMISSION TESTING: Status: ACTIVE | Noted: 2024-03-05

## 2024-03-05 LAB
BACTERIA SPEC CULT: NORMAL
BACTERIA SPEC CULT: NORMAL
SERVICE CMNT-IMP: NORMAL

## 2024-03-05 ASSESSMENT — PROMIS GLOBAL HEALTH SCALE
IN GENERAL, WOULD YOU SAY YOUR HEALTH IS...[ON A SCALE OF 1 (POOR) TO 5 (EXCELLENT)]: 3
IN GENERAL, WOULD YOU SAY YOUR QUALITY OF LIFE IS...[ON A SCALE OF 1 (POOR) TO 5 (EXCELLENT)]: 3
IN GENERAL, HOW WOULD YOU RATE YOUR MENTAL HEALTH, INCLUDING YOUR MOOD AND YOUR ABILITY TO THINK [ON A SCALE OF 1 (POOR) TO 5 (EXCELLENT)]?: 3
IN THE PAST 7 DAYS, HOW WOULD YOU RATE YOUR FATIGUE ON AVERAGE [ON A SCALE FROM 1 (NONE) TO 5 (VERY SEVERE)]?: 3
IN GENERAL, PLEASE RATE HOW WELL YOU CARRY OUT YOUR USUAL SOCIAL ACTIVITIES (INCLUDES ACTIVITIES AT HOME, AT WORK, AND IN YOUR COMMUNITY, AND RESPONSIBILITIES AS A PARENT, CHILD, SPOUSE, EMPLOYEE, FRIEND, ETC) [ON A SCALE OF 1 (POOR) TO 5 (EXCELLENT)]?: 4
WHO IS THE PERSON COMPLETING THE PROMIS V1.1 SURVEY?: 0
IN GENERAL, HOW WOULD YOU RATE YOUR PHYSICAL HEALTH [ON A SCALE OF 1 (POOR) TO 5 (EXCELLENT)]?: 3
HOW IS THE PROMIS V1.1 BEING ADMINISTERED?: 0
TO WHAT EXTENT ARE YOU ABLE TO CARRY OUT YOUR EVERYDAY PHYSICAL ACTIVITIES SUCH AS WALKING, CLIMBING STAIRS, CARRYING GROCERIES, OR MOVING A CHAIR [ON A SCALE OF 1 (NOT AT ALL) TO 5 (COMPLETELY)]?: 3
SUM OF RESPONSES TO QUESTIONS 3, 6, 7, & 8: 16
IN THE PAST 7 DAYS, HOW OFTEN HAVE YOU BEEN BOTHERED BY EMOTIONAL PROBLEMS, SUCH AS FEELING ANXIOUS, DEPRESSED, OR IRRITABLE [ON A SCALE FROM 1 (NEVER) TO 5 (ALWAYS)]?: 3
IN GENERAL, HOW WOULD YOU RATE YOUR SATISFACTION WITH YOUR SOCIAL ACTIVITIES AND RELATIONSHIPS [ON A SCALE OF 1 (POOR) TO 5 (EXCELLENT)]?: 3
IN THE PAST 7 DAYS, HOW WOULD YOU RATE YOUR PAIN ON AVERAGE [ON A SCALE FROM 0 (NO PAIN) TO 10 (WORST IMAGINABLE PAIN)]?: 7
SUM OF RESPONSES TO QUESTIONS 2, 4, 5, & 10: 12

## 2024-03-05 ASSESSMENT — KOOS JR
KOOS JR TOTAL INTERVAL SCORE: 34.174
STRAIGHTENING KNEE FULLY: 3
GOING UP OR DOWN STAIRS: 3
TWISING OR PIVOTING ON KNEE: 4
HOW SEVERE IS YOUR KNEE STIFFNESS AFTER FIRST WAKING IN MORNING: 2
RISING FROM SITTING: 3
STANDING UPRIGHT: 3
BENDING TO THE FLOOR TO PICK UP OBJECT: 3

## 2024-03-05 NOTE — TELEPHONE ENCOUNTER
Patient having surgery on 3/18. Pre-admission would like patient to stop Trulicity tomorrow (2 weeks) prior to surgery.  Patient would like instructions from Dr. Castro.

## 2024-03-06 LAB
BACTERIA SPEC CULT: ABNORMAL
CC UR VC: ABNORMAL
SERVICE CMNT-IMP: ABNORMAL
TRANSFERRIN SERPL-MCNC: 339 MG/DL (ref 192–364)

## 2024-03-06 NOTE — TELEPHONE ENCOUNTER
Patient voicemail identifies Ms. Coulter. Per her request left message on voicemail stating ok to stop Trulicity for 2 weeks before surgery.

## 2024-03-06 NOTE — PERIOP NOTE
Called to Dr. Jaylan Kolb office, confirmed receipt of request for LOVN. Called to Dr. Nagel office - will fax LOVN now and send ASA plan request to nurses in Dr. Nagel office.

## 2024-03-07 LAB
EKG ATRIAL RATE: 87 BPM
EKG DIAGNOSIS: NORMAL
EKG P AXIS: 64 DEGREES
EKG P-R INTERVAL: 146 MS
EKG Q-T INTERVAL: 364 MS
EKG QRS DURATION: 86 MS
EKG QTC CALCULATION (BAZETT): 438 MS
EKG R AXIS: -43 DEGREES
EKG T AXIS: 69 DEGREES
EKG VENTRICULAR RATE: 87 BPM

## 2024-03-07 RX ORDER — CEFUROXIME AXETIL 500 MG/1
500 TABLET ORAL 2 TIMES DAILY
Qty: 10 TABLET | Refills: 0 | Status: SHIPPED | OUTPATIENT
Start: 2024-03-07 | End: 2024-03-12

## 2024-03-08 NOTE — PERIOP NOTE
Call to patient, aware that prescription has been sent to pharmacy per JHON Land NP.      Call to Reggie at [facility], ASA plan has been received and will be returned to PAT once signed.      Call to Shabana, aware that 10/2022 note will be faxed for Dr. Bell's review concerning possible IVC filter placement prior to left TKR.

## 2024-03-08 NOTE — PERIOP NOTE
Urine culture positive for UTI; Rx for Ceftin 500 mg 1 tab twice  daily for 5 days sent to [facility] pharmacy.

## 2024-03-15 NOTE — PERIOP NOTE
Hello,     You are scheduled to have surgery tomorrow at [facility].     We would like for you to arrive at  09:30 am  We are located on the second floor, [address]. You will check-in at the registration desk located outside the elevators on the second floor prior to proceeding to [address].  Remember nothing to eat or drink after midnight. If you need to take medications the morning of surgery, please take with a few sips of water.   Wear loose, comfortable clothing and leave all your jewelry at home.   You may bring your cell phone with you.  One family member will be allowed in the pre-op area once you are dressed and your IV has been started.   You will need someone to drive you home and be with you for 24 hours post-anesthesia.     We look forward to seeing you! Call [phone] for questions after hours and [phone] between 5:30AM and 6PM.     Thanks!    [facility] ASU PREOP TEAM

## 2024-03-18 ENCOUNTER — APPOINTMENT (OUTPATIENT)
Facility: HOSPITAL | Age: 79
End: 2024-03-18
Attending: ORTHOPAEDIC SURGERY
Payer: MEDICARE

## 2024-03-18 ENCOUNTER — ANESTHESIA EVENT (OUTPATIENT)
Facility: HOSPITAL | Age: 79
End: 2024-03-18
Payer: MEDICARE

## 2024-03-18 ENCOUNTER — ANESTHESIA (OUTPATIENT)
Facility: HOSPITAL | Age: 79
End: 2024-03-18
Payer: MEDICARE

## 2024-03-18 ENCOUNTER — HOSPITAL ENCOUNTER (OUTPATIENT)
Facility: HOSPITAL | Age: 79
Setting detail: OBSERVATION
Discharge: HOME | End: 2024-03-19
Attending: ORTHOPAEDIC SURGERY | Admitting: ORTHOPAEDIC SURGERY
Payer: MEDICARE

## 2024-03-18 DIAGNOSIS — M17.12 ARTHRITIS OF LEFT KNEE: Primary | ICD-10-CM

## 2024-03-18 DIAGNOSIS — G89.18 ACUTE POST-OPERATIVE PAIN: ICD-10-CM

## 2024-03-18 LAB
GLUCOSE BLD STRIP.AUTO-MCNC: 146 MG/DL (ref 65–117)
GLUCOSE BLD STRIP.AUTO-MCNC: 151 MG/DL (ref 65–117)
GLUCOSE BLD STRIP.AUTO-MCNC: 240 MG/DL (ref 65–117)
SERVICE CMNT-IMP: ABNORMAL

## 2024-03-18 PROCEDURE — 6370000000 HC RX 637 (ALT 250 FOR IP): Performed by: PHYSICIAN ASSISTANT

## 2024-03-18 PROCEDURE — 6360000002 HC RX W HCPCS: Performed by: NURSE ANESTHETIST, CERTIFIED REGISTERED

## 2024-03-18 PROCEDURE — 2720000010 HC SURG SUPPLY STERILE: Performed by: ORTHOPAEDIC SURGERY

## 2024-03-18 PROCEDURE — 6360000002 HC RX W HCPCS: Performed by: ANESTHESIOLOGY

## 2024-03-18 PROCEDURE — 2580000003 HC RX 258: Performed by: STUDENT IN AN ORGANIZED HEALTH CARE EDUCATION/TRAINING PROGRAM

## 2024-03-18 PROCEDURE — 97116 GAIT TRAINING THERAPY: CPT

## 2024-03-18 PROCEDURE — 2580000003 HC RX 258: Performed by: ORTHOPAEDIC SURGERY

## 2024-03-18 PROCEDURE — 7100000001 HC PACU RECOVERY - ADDTL 15 MIN: Performed by: ORTHOPAEDIC SURGERY

## 2024-03-18 PROCEDURE — 6360000002 HC RX W HCPCS: Performed by: ORTHOPAEDIC SURGERY

## 2024-03-18 PROCEDURE — 2580000003 HC RX 258: Performed by: PHYSICIAN ASSISTANT

## 2024-03-18 PROCEDURE — 3600000005 HC SURGERY LEVEL 5 BASE: Performed by: ORTHOPAEDIC SURGERY

## 2024-03-18 PROCEDURE — 6370000000 HC RX 637 (ALT 250 FOR IP): Performed by: ANESTHESIOLOGY

## 2024-03-18 PROCEDURE — 2500000003 HC RX 250 WO HCPCS: Performed by: NURSE ANESTHETIST, CERTIFIED REGISTERED

## 2024-03-18 PROCEDURE — 2709999900 HC NON-CHARGEABLE SUPPLY: Performed by: ORTHOPAEDIC SURGERY

## 2024-03-18 PROCEDURE — 6370000000 HC RX 637 (ALT 250 FOR IP): Performed by: ORTHOPAEDIC SURGERY

## 2024-03-18 PROCEDURE — 3700000001 HC ADD 15 MINUTES (ANESTHESIA): Performed by: ORTHOPAEDIC SURGERY

## 2024-03-18 PROCEDURE — 3700000000 HC ANESTHESIA ATTENDED CARE: Performed by: ORTHOPAEDIC SURGERY

## 2024-03-18 PROCEDURE — G0378 HOSPITAL OBSERVATION PER HR: HCPCS

## 2024-03-18 PROCEDURE — 64447 NJX AA&/STRD FEMORAL NRV IMG: CPT | Performed by: ANESTHESIOLOGY

## 2024-03-18 PROCEDURE — 73560 X-RAY EXAM OF KNEE 1 OR 2: CPT

## 2024-03-18 PROCEDURE — 6360000002 HC RX W HCPCS: Performed by: PHYSICIAN ASSISTANT

## 2024-03-18 PROCEDURE — 97161 PT EVAL LOW COMPLEX 20 MIN: CPT

## 2024-03-18 PROCEDURE — 3600000015 HC SURGERY LEVEL 5 ADDTL 15MIN: Performed by: ORTHOPAEDIC SURGERY

## 2024-03-18 PROCEDURE — 82962 GLUCOSE BLOOD TEST: CPT

## 2024-03-18 PROCEDURE — 7100000000 HC PACU RECOVERY - FIRST 15 MIN: Performed by: ORTHOPAEDIC SURGERY

## 2024-03-18 PROCEDURE — C1776 JOINT DEVICE (IMPLANTABLE): HCPCS | Performed by: ORTHOPAEDIC SURGERY

## 2024-03-18 DEVICE — CRUCIATE RETAINING FEMORAL
Type: IMPLANTABLE DEVICE | Site: KNEE | Status: FUNCTIONAL
Brand: TRIATHLON

## 2024-03-18 DEVICE — PATELLA
Type: IMPLANTABLE DEVICE | Site: KNEE | Status: FUNCTIONAL
Brand: TRIATHLON

## 2024-03-18 DEVICE — COMPONENT TOT KNEE CAPPED PRIMARY K2STRYKER] STRYKER CORP]: Type: IMPLANTABLE DEVICE | Status: FUNCTIONAL

## 2024-03-18 DEVICE — TIBIAL BEARING INSERT - CR
Type: IMPLANTABLE DEVICE | Site: KNEE | Status: FUNCTIONAL
Brand: TRIATHLON

## 2024-03-18 DEVICE — TIBIAL COMPONENT
Type: IMPLANTABLE DEVICE | Site: KNEE | Status: FUNCTIONAL
Brand: TRIATHLON

## 2024-03-18 RX ORDER — BISACODYL 5 MG/1
5 TABLET, DELAYED RELEASE ORAL DAILY PRN
Status: DISCONTINUED | OUTPATIENT
Start: 2024-03-18 | End: 2024-03-19 | Stop reason: HOSPADM

## 2024-03-18 RX ORDER — BISACODYL 10 MG
10 SUPPOSITORY, RECTAL RECTAL DAILY PRN
Status: DISCONTINUED | OUTPATIENT
Start: 2024-03-18 | End: 2024-03-19 | Stop reason: HOSPADM

## 2024-03-18 RX ORDER — SODIUM CHLORIDE 0.9 % (FLUSH) 0.9 %
5-40 SYRINGE (ML) INJECTION PRN
Status: DISCONTINUED | OUTPATIENT
Start: 2024-03-18 | End: 2024-03-19 | Stop reason: HOSPADM

## 2024-03-18 RX ORDER — PREGABALIN 75 MG/1
75 CAPSULE ORAL ONCE
Status: DISCONTINUED | OUTPATIENT
Start: 2024-03-18 | End: 2024-03-19 | Stop reason: HOSPADM

## 2024-03-18 RX ORDER — DIPHENHYDRAMINE HYDROCHLORIDE 50 MG/ML
25 INJECTION INTRAMUSCULAR; INTRAVENOUS EVERY 6 HOURS PRN
Status: DISCONTINUED | OUTPATIENT
Start: 2024-03-18 | End: 2024-03-19 | Stop reason: HOSPADM

## 2024-03-18 RX ORDER — OXYCODONE HYDROCHLORIDE 5 MG/1
5 TABLET ORAL EVERY 4 HOURS PRN
Qty: 42 TABLET | Refills: 0 | Status: SHIPPED | OUTPATIENT
Start: 2024-03-18 | End: 2024-03-25

## 2024-03-18 RX ORDER — LIDOCAINE HYDROCHLORIDE 10 MG/ML
1 INJECTION, SOLUTION EPIDURAL; INFILTRATION; INTRACAUDAL; PERINEURAL
Status: DISCONTINUED | OUTPATIENT
Start: 2024-03-18 | End: 2024-03-18 | Stop reason: HOSPADM

## 2024-03-18 RX ORDER — BUPIVACAINE HYDROCHLORIDE 5 MG/ML
INJECTION, SOLUTION EPIDURAL; INTRACAUDAL PRN
Status: DISCONTINUED | OUTPATIENT
Start: 2024-03-18 | End: 2024-03-18 | Stop reason: SDUPTHER

## 2024-03-18 RX ORDER — INSULIN LISPRO 100 [IU]/ML
0-4 INJECTION, SOLUTION INTRAVENOUS; SUBCUTANEOUS NIGHTLY
Status: DISCONTINUED | OUTPATIENT
Start: 2024-03-18 | End: 2024-03-19 | Stop reason: HOSPADM

## 2024-03-18 RX ORDER — ONDANSETRON 4 MG/1
4 TABLET, ORALLY DISINTEGRATING ORAL EVERY 8 HOURS PRN
Qty: 10 TABLET | Refills: 0 | Status: SHIPPED | OUTPATIENT
Start: 2024-03-18

## 2024-03-18 RX ORDER — CETIRIZINE HYDROCHLORIDE 10 MG/1
5 TABLET ORAL 2 TIMES DAILY PRN
Status: DISCONTINUED | OUTPATIENT
Start: 2024-03-18 | End: 2024-03-19 | Stop reason: HOSPADM

## 2024-03-18 RX ORDER — SODIUM CHLORIDE 9 MG/ML
INJECTION, SOLUTION INTRAVENOUS PRN
Status: DISCONTINUED | OUTPATIENT
Start: 2024-03-18 | End: 2024-03-19 | Stop reason: HOSPADM

## 2024-03-18 RX ORDER — ONDANSETRON 2 MG/ML
4 INJECTION INTRAMUSCULAR; INTRAVENOUS EVERY 6 HOURS PRN
Status: DISCONTINUED | OUTPATIENT
Start: 2024-03-18 | End: 2024-03-19 | Stop reason: HOSPADM

## 2024-03-18 RX ORDER — HYDROMORPHONE HYDROCHLORIDE 1 MG/ML
1 INJECTION, SOLUTION INTRAMUSCULAR; INTRAVENOUS; SUBCUTANEOUS
Status: DISCONTINUED | OUTPATIENT
Start: 2024-03-18 | End: 2024-03-19

## 2024-03-18 RX ORDER — OXYCODONE HYDROCHLORIDE 5 MG/1
5 TABLET ORAL
Status: DISCONTINUED | OUTPATIENT
Start: 2024-03-18 | End: 2024-03-19 | Stop reason: HOSPADM

## 2024-03-18 RX ORDER — VALSARTAN 80 MG/1
80 TABLET ORAL DAILY
Status: DISCONTINUED | OUTPATIENT
Start: 2024-03-18 | End: 2024-03-19 | Stop reason: HOSPADM

## 2024-03-18 RX ORDER — ASPIRIN 81 MG/1
81 TABLET ORAL 2 TIMES DAILY
Qty: 60 TABLET | Refills: 3 | Status: SHIPPED | OUTPATIENT
Start: 2024-03-18 | End: 2024-03-18 | Stop reason: HOSPADM

## 2024-03-18 RX ORDER — DIPHENHYDRAMINE HCL 25 MG
25 CAPSULE ORAL EVERY 6 HOURS PRN
Status: DISCONTINUED | OUTPATIENT
Start: 2024-03-18 | End: 2024-03-19 | Stop reason: HOSPADM

## 2024-03-18 RX ORDER — SODIUM CHLORIDE 0.9 % (FLUSH) 0.9 %
5-40 SYRINGE (ML) INJECTION EVERY 12 HOURS SCHEDULED
Status: DISCONTINUED | OUTPATIENT
Start: 2024-03-18 | End: 2024-03-19 | Stop reason: HOSPADM

## 2024-03-18 RX ORDER — SPIRONOLACTONE 25 MG/1
25 TABLET ORAL 2 TIMES DAILY
Status: DISCONTINUED | OUTPATIENT
Start: 2024-03-18 | End: 2024-03-19 | Stop reason: HOSPADM

## 2024-03-18 RX ORDER — 0.9 % SODIUM CHLORIDE 0.9 %
500 INTRAVENOUS SOLUTION INTRAVENOUS ONCE
Status: DISCONTINUED | OUTPATIENT
Start: 2024-03-18 | End: 2024-03-19 | Stop reason: HOSPADM

## 2024-03-18 RX ORDER — SODIUM CHLORIDE 9 MG/ML
INJECTION, SOLUTION INTRAVENOUS CONTINUOUS
Status: DISCONTINUED | OUTPATIENT
Start: 2024-03-18 | End: 2024-03-19 | Stop reason: HOSPADM

## 2024-03-18 RX ORDER — ROSUVASTATIN CALCIUM 10 MG/1
10 TABLET, COATED ORAL NIGHTLY
Status: DISCONTINUED | OUTPATIENT
Start: 2024-03-18 | End: 2024-03-19 | Stop reason: HOSPADM

## 2024-03-18 RX ORDER — TRANEXAMIC ACID 100 MG/ML
INJECTION, SOLUTION INTRAVENOUS PRN
Status: DISCONTINUED | OUTPATIENT
Start: 2024-03-18 | End: 2024-03-18 | Stop reason: SDUPTHER

## 2024-03-18 RX ORDER — DEXTROSE MONOHYDRATE 100 MG/ML
INJECTION, SOLUTION INTRAVENOUS CONTINUOUS PRN
Status: DISCONTINUED | OUTPATIENT
Start: 2024-03-18 | End: 2024-03-19 | Stop reason: HOSPADM

## 2024-03-18 RX ORDER — ONDANSETRON 4 MG/1
4 TABLET, ORALLY DISINTEGRATING ORAL EVERY 8 HOURS PRN
Status: DISCONTINUED | OUTPATIENT
Start: 2024-03-18 | End: 2024-03-19 | Stop reason: HOSPADM

## 2024-03-18 RX ORDER — OXYCODONE HYDROCHLORIDE 5 MG/1
10 TABLET ORAL
Status: DISCONTINUED | OUTPATIENT
Start: 2024-03-18 | End: 2024-03-19 | Stop reason: HOSPADM

## 2024-03-18 RX ORDER — METFORMIN HYDROCHLORIDE 500 MG/1
500 TABLET, EXTENDED RELEASE ORAL 2 TIMES DAILY WITH MEALS
Status: DISCONTINUED | OUTPATIENT
Start: 2024-03-18 | End: 2024-03-19 | Stop reason: HOSPADM

## 2024-03-18 RX ORDER — OXYCODONE HYDROCHLORIDE 5 MG/1
5 TABLET ORAL
Status: DISCONTINUED | OUTPATIENT
Start: 2024-03-18 | End: 2024-03-18 | Stop reason: HOSPADM

## 2024-03-18 RX ORDER — ONDANSETRON 2 MG/ML
4 INJECTION INTRAMUSCULAR; INTRAVENOUS
Status: DISCONTINUED | OUTPATIENT
Start: 2024-03-18 | End: 2024-03-18 | Stop reason: HOSPADM

## 2024-03-18 RX ORDER — VITAMIN C
1 TAB ORAL EVERY MORNING
Status: DISCONTINUED | OUTPATIENT
Start: 2024-03-19 | End: 2024-03-19 | Stop reason: HOSPADM

## 2024-03-18 RX ORDER — ACETAMINOPHEN 325 MG/1
650 TABLET ORAL EVERY 6 HOURS
Status: DISCONTINUED | OUTPATIENT
Start: 2024-03-18 | End: 2024-03-19 | Stop reason: HOSPADM

## 2024-03-18 RX ORDER — INSULIN LISPRO 100 [IU]/ML
0-8 INJECTION, SOLUTION INTRAVENOUS; SUBCUTANEOUS
Status: DISCONTINUED | OUTPATIENT
Start: 2024-03-18 | End: 2024-03-19 | Stop reason: HOSPADM

## 2024-03-18 RX ORDER — IBUPROFEN 800 MG/1
800 TABLET ORAL EVERY 8 HOURS PRN
Qty: 60 TABLET | Refills: 0 | Status: SHIPPED | OUTPATIENT
Start: 2024-03-18

## 2024-03-18 RX ORDER — ROPIVACAINE HYDROCHLORIDE 2 MG/ML
INJECTION, SOLUTION EPIDURAL; INFILTRATION; PERINEURAL PRN
Status: DISCONTINUED | OUTPATIENT
Start: 2024-03-18 | End: 2024-03-18 | Stop reason: SDUPTHER

## 2024-03-18 RX ORDER — FENTANYL CITRATE 50 UG/ML
100 INJECTION, SOLUTION INTRAMUSCULAR; INTRAVENOUS
Status: DISCONTINUED | OUTPATIENT
Start: 2024-03-18 | End: 2024-03-18 | Stop reason: HOSPADM

## 2024-03-18 RX ORDER — FENTANYL CITRATE 50 UG/ML
25 INJECTION, SOLUTION INTRAMUSCULAR; INTRAVENOUS EVERY 5 MIN PRN
Status: DISCONTINUED | OUTPATIENT
Start: 2024-03-18 | End: 2024-03-18 | Stop reason: HOSPADM

## 2024-03-18 RX ORDER — NALOXONE HYDROCHLORIDE 0.4 MG/ML
INJECTION, SOLUTION INTRAMUSCULAR; INTRAVENOUS; SUBCUTANEOUS PRN
Status: DISCONTINUED | OUTPATIENT
Start: 2024-03-18 | End: 2024-03-18 | Stop reason: HOSPADM

## 2024-03-18 RX ORDER — ACETAMINOPHEN 325 MG/1
650 TABLET ORAL EVERY 4 HOURS
Qty: 120 TABLET | Refills: 1 | Status: SHIPPED | OUTPATIENT
Start: 2024-03-18 | End: 2024-04-17

## 2024-03-18 RX ORDER — KETOROLAC TROMETHAMINE 15 MG/ML
15 INJECTION, SOLUTION INTRAMUSCULAR; INTRAVENOUS ONCE
Status: COMPLETED | OUTPATIENT
Start: 2024-03-18 | End: 2024-03-18

## 2024-03-18 RX ORDER — POLYETHYLENE GLYCOL 3350 17 G/17G
17 POWDER, FOR SOLUTION ORAL DAILY
Status: DISCONTINUED | OUTPATIENT
Start: 2024-03-18 | End: 2024-03-19 | Stop reason: HOSPADM

## 2024-03-18 RX ORDER — LANOLIN ALCOHOL/MO/W.PET/CERES
400 CREAM (GRAM) TOPICAL NIGHTLY
Status: DISCONTINUED | OUTPATIENT
Start: 2024-03-18 | End: 2024-03-19 | Stop reason: HOSPADM

## 2024-03-18 RX ORDER — PROPOFOL 10 MG/ML
INJECTION, EMULSION INTRAVENOUS CONTINUOUS PRN
Status: DISCONTINUED | OUTPATIENT
Start: 2024-03-18 | End: 2024-03-18 | Stop reason: SDUPTHER

## 2024-03-18 RX ORDER — ACETAMINOPHEN 325 MG/1
975 TABLET ORAL ONCE
Status: COMPLETED | OUTPATIENT
Start: 2024-03-18 | End: 2024-03-18

## 2024-03-18 RX ORDER — FAMOTIDINE 20 MG/1
20 TABLET, FILM COATED ORAL 2 TIMES DAILY
Status: DISCONTINUED | OUTPATIENT
Start: 2024-03-18 | End: 2024-03-19 | Stop reason: HOSPADM

## 2024-03-18 RX ORDER — FENTANYL CITRATE 50 UG/ML
INJECTION, SOLUTION INTRAMUSCULAR; INTRAVENOUS PRN
Status: DISCONTINUED | OUTPATIENT
Start: 2024-03-18 | End: 2024-03-18 | Stop reason: SDUPTHER

## 2024-03-18 RX ORDER — PSEUDOEPHEDRINE HCL 120 MG/1
120 TABLET, FILM COATED, EXTENDED RELEASE ORAL 2 TIMES DAILY PRN
Status: DISCONTINUED | OUTPATIENT
Start: 2024-03-18 | End: 2024-03-19 | Stop reason: HOSPADM

## 2024-03-18 RX ORDER — SODIUM CHLORIDE, SODIUM LACTATE, POTASSIUM CHLORIDE, CALCIUM CHLORIDE 600; 310; 30; 20 MG/100ML; MG/100ML; MG/100ML; MG/100ML
INJECTION, SOLUTION INTRAVENOUS CONTINUOUS
Status: DISCONTINUED | OUTPATIENT
Start: 2024-03-18 | End: 2024-03-18

## 2024-03-18 RX ORDER — MIDAZOLAM HYDROCHLORIDE 1 MG/ML
INJECTION INTRAMUSCULAR; INTRAVENOUS PRN
Status: DISCONTINUED | OUTPATIENT
Start: 2024-03-18 | End: 2024-03-18 | Stop reason: SDUPTHER

## 2024-03-18 RX ORDER — DIPHENHYDRAMINE HYDROCHLORIDE 50 MG/ML
12.5 INJECTION INTRAMUSCULAR; INTRAVENOUS
Status: DISCONTINUED | OUTPATIENT
Start: 2024-03-18 | End: 2024-03-18 | Stop reason: HOSPADM

## 2024-03-18 RX ORDER — SODIUM CHLORIDE, SODIUM LACTATE, POTASSIUM CHLORIDE, CALCIUM CHLORIDE 600; 310; 30; 20 MG/100ML; MG/100ML; MG/100ML; MG/100ML
INJECTION, SOLUTION INTRAVENOUS CONTINUOUS
Status: DISCONTINUED | OUTPATIENT
Start: 2024-03-18 | End: 2024-03-18 | Stop reason: HOSPADM

## 2024-03-18 RX ORDER — PREGABALIN 25 MG/1
25 CAPSULE ORAL NIGHTLY
Status: DISCONTINUED | OUTPATIENT
Start: 2024-03-18 | End: 2024-03-19 | Stop reason: HOSPADM

## 2024-03-18 RX ORDER — FEXOFENADINE HCL AND PSEUDOEPHEDRINE HCI 60; 120 MG/1; MG/1
1 TABLET, EXTENDED RELEASE ORAL AS NEEDED
Status: DISCONTINUED | OUTPATIENT
Start: 2024-03-18 | End: 2024-03-18 | Stop reason: CLARIF

## 2024-03-18 RX ADMIN — LIDOCAINE HYDROCHLORIDE 40 MG: 20 INJECTION, SOLUTION EPIDURAL; INFILTRATION; INTRACAUDAL; PERINEURAL at 12:05

## 2024-03-18 RX ADMIN — SODIUM CHLORIDE, PRESERVATIVE FREE 10 ML: 5 INJECTION INTRAVENOUS at 21:00

## 2024-03-18 RX ADMIN — ACETAMINOPHEN 975 MG: 325 TABLET ORAL at 10:14

## 2024-03-18 RX ADMIN — PROPOFOL 50 MCG/KG/MIN: 10 INJECTION, EMULSION INTRAVENOUS at 11:22

## 2024-03-18 RX ADMIN — METFORMIN HYDROCHLORIDE 500 MG: 500 TABLET, EXTENDED RELEASE ORAL at 18:41

## 2024-03-18 RX ADMIN — MIDAZOLAM HYDROCHLORIDE 1 MG: 1 INJECTION, SOLUTION INTRAMUSCULAR; INTRAVENOUS at 10:57

## 2024-03-18 RX ADMIN — CEFAZOLIN 2000 MG: 2 INJECTION, POWDER, FOR SOLUTION INTRAMUSCULAR; INTRAVENOUS at 16:42

## 2024-03-18 RX ADMIN — SODIUM CHLORIDE, POTASSIUM CHLORIDE, SODIUM LACTATE AND CALCIUM CHLORIDE: 600; 310; 30; 20 INJECTION, SOLUTION INTRAVENOUS at 11:14

## 2024-03-18 RX ADMIN — SODIUM CHLORIDE, POTASSIUM CHLORIDE, SODIUM LACTATE AND CALCIUM CHLORIDE: 600; 310; 30; 20 INJECTION, SOLUTION INTRAVENOUS at 10:15

## 2024-03-18 RX ADMIN — MIDAZOLAM HYDROCHLORIDE 1 MG: 1 INJECTION, SOLUTION INTRAMUSCULAR; INTRAVENOUS at 10:47

## 2024-03-18 RX ADMIN — PREGABALIN 25 MG: 25 CAPSULE ORAL at 20:59

## 2024-03-18 RX ADMIN — BUPIVACAINE HYDROCHLORIDE 2.2 ML: 5 INJECTION, SOLUTION EPIDURAL; INTRACAUDAL; PERINEURAL at 11:06

## 2024-03-18 RX ADMIN — ACETAMINOPHEN 650 MG: 325 TABLET ORAL at 20:59

## 2024-03-18 RX ADMIN — FENTANYL CITRATE 100 MCG: 50 INJECTION, SOLUTION INTRAMUSCULAR; INTRAVENOUS at 10:47

## 2024-03-18 RX ADMIN — MIDAZOLAM HYDROCHLORIDE 2 MG: 1 INJECTION, SOLUTION INTRAMUSCULAR; INTRAVENOUS at 11:22

## 2024-03-18 RX ADMIN — PROPOFOL 30 MG: 10 INJECTION, EMULSION INTRAVENOUS at 12:56

## 2024-03-18 RX ADMIN — OXYCODONE 10 MG: 5 TABLET ORAL at 14:01

## 2024-03-18 RX ADMIN — PROPOFOL 30 MG: 10 INJECTION, EMULSION INTRAVENOUS at 12:05

## 2024-03-18 RX ADMIN — KETOROLAC TROMETHAMINE 15 MG: 15 INJECTION, SOLUTION INTRAMUSCULAR; INTRAVENOUS at 13:50

## 2024-03-18 RX ADMIN — Medication 400 MG: at 20:59

## 2024-03-18 RX ADMIN — WATER 2000 MG: 1 INJECTION INTRAMUSCULAR; INTRAVENOUS; SUBCUTANEOUS at 11:29

## 2024-03-18 RX ADMIN — SODIUM CHLORIDE: 9 INJECTION, SOLUTION INTRAVENOUS at 21:09

## 2024-03-18 RX ADMIN — TRANEXAMIC ACID 1000 MG: 100 INJECTION, SOLUTION INTRAVENOUS at 11:27

## 2024-03-18 RX ADMIN — TRANEXAMIC ACID 1000 MG: 100 INJECTION, SOLUTION INTRAVENOUS at 12:36

## 2024-03-18 RX ADMIN — ROPIVACAINE HYDROCHLORIDE 20 ML: 2 INJECTION, SOLUTION EPIDURAL; INFILTRATION at 10:57

## 2024-03-18 RX ADMIN — ROSUVASTATIN CALCIUM 10 MG: 10 TABLET, COATED ORAL at 20:59

## 2024-03-18 RX ADMIN — SODIUM CHLORIDE: 9 INJECTION, SOLUTION INTRAVENOUS at 13:45

## 2024-03-18 RX ADMIN — APIXABAN 2.5 MG: 2.5 TABLET, FILM COATED ORAL at 22:38

## 2024-03-18 RX ADMIN — SPIRONOLACTONE 25 MG: 25 TABLET ORAL at 20:59

## 2024-03-18 RX ADMIN — OXYCODONE 10 MG: 5 TABLET ORAL at 17:04

## 2024-03-18 ASSESSMENT — PAIN DESCRIPTION - ORIENTATION
ORIENTATION: LEFT

## 2024-03-18 ASSESSMENT — PAIN DESCRIPTION - DESCRIPTORS
DESCRIPTORS: ACHING

## 2024-03-18 ASSESSMENT — PAIN SCALES - GENERAL
PAINLEVEL_OUTOF10: 3
PAINLEVEL_OUTOF10: 7
PAINLEVEL_OUTOF10: 6
PAINLEVEL_OUTOF10: 3
PAINLEVEL_OUTOF10: 7

## 2024-03-18 ASSESSMENT — PAIN DESCRIPTION - LOCATION
LOCATION: KNEE

## 2024-03-18 NOTE — ANESTHESIA POSTPROCEDURE EVALUATION
Department of Anesthesiology  Postprocedure Note    Patient: Mariela Coulter  MRN: [id number]  YOB: 1945  Date of evaluation: 3/18/2024    Procedure Summary       Date: 03/18/24 Room / Location: [facility] ASU OR [address] AMBULATORY OR    Anesthesia Start: 1114 Anesthesia Stop: 1318    Procedure: LEFT TOTAL KNEE ARTHROPLASTY (TRAM) (Left: Knee) Diagnosis:       Patellofemoral arthritis of left knee      (Patellofemoral arthritis of left knee [M17.12])    Surgeons: Amor Bell MD Responsible Provider: Gareth Girard MD    Anesthesia Type: Regional, MAC, Spinal ASA Status: 2            Anesthesia Type: Regional, MAC, Spinal    Gume Phase I:      Gume Phase II:      Anesthesia Post Evaluation    Patient location during evaluation: PACU  Patient participation: complete - patient participated  Level of consciousness: awake  Pain score: 0  Airway patency: patent  Nausea & Vomiting: no nausea and no vomiting  Cardiovascular status: blood pressure returned to baseline  Respiratory status: acceptable  Hydration status: euvolemic  Multimodal analgesia pain management approach  Pain management: adequate    No notable events documented.

## 2024-03-18 NOTE — ANESTHESIA PRE PROCEDURE
Readings from Last 3 Encounters:   03/18/24 96.5 kg (212 lb 11.9 oz)   03/04/24 96.5 kg (212 lb 12.8 oz)   02/03/24 101.2 kg (223 lb)     Body mass index is 34.34 kg/m².    CBC:   Lab Results   Component Value Date/Time    WBC 9.2 03/04/2024 02:18 PM    RBC 4.67 03/04/2024 02:18 PM    HGB 13.0 03/04/2024 02:18 PM    HCT 40.8 03/04/2024 02:18 PM    MCV 87.4 03/04/2024 02:18 PM    RDW 14.6 03/04/2024 02:18 PM     03/04/2024 02:18 PM       CMP:   Lab Results   Component Value Date/Time     03/04/2024 02:18 PM    K 4.1 03/04/2024 02:18 PM     03/04/2024 02:18 PM    CO2 28 03/04/2024 02:18 PM    BUN 15 03/04/2024 02:18 PM    CREATININE 0.65 03/04/2024 02:18 PM    GFRAA >60 10/18/2021 09:42 AM    AGRATIO 1.4 03/04/2024 02:18 PM    AGRATIO 1.4 10/26/2022 10:19 AM    LABGLOM >60 03/04/2024 02:18 PM    LABGLOM 92 04/11/2022 12:00 AM    GLUCOSE 107 03/04/2024 02:18 PM    PROT 7.2 03/04/2024 02:18 PM    CALCIUM 10.0 03/04/2024 02:18 PM    BILITOT 2.0 03/04/2024 02:18 PM    ALKPHOS 64 03/04/2024 02:18 PM    ALKPHOS 48 10/26/2022 10:19 AM    AST 12 03/04/2024 02:18 PM    ALT 16 03/04/2024 02:18 PM       POC Tests: No results for input(s): \"POCGLU\", \"POCNA\", \"POCK\", \"POCCL\", \"POCBUN\", \"POCHEMO\", \"POCHCT\" in the last 72 hours.    Coags: No results found for: \"PROTIME\", \"INR\", \"APTT\"    HCG (If Applicable): No results found for: \"PREGTESTUR\", \"PREGSERUM\", \"HCG\", \"HCGQUANT\"     ABGs: No results found for: \"PHART\", \"PO2ART\", \"RXE5MVO\", \"QDL6WEW\", \"BEART\", \"X2ANVRJZ\"     Type & Screen (If Applicable):  No results found for: \"LABABO\", \"LABRH\"    Drug/Infectious Status (If Applicable):  No results found for: \"HIV\", \"HEPCAB\"    COVID-19 Screening (If Applicable): No results found for: \"COVID19\"        Anesthesia Evaluation  Patient summary reviewed and Nursing notes reviewed  Airway: Mallampati: II  TM distance: >3 FB   Neck ROM: full  Mouth opening: > = 3 FB   Dental:    (+) poor dentition      Pulmonary: breath

## 2024-03-18 NOTE — DISCHARGE INSTRUCTIONS
TOTAL KNEE DISCHARGE INSTRUCTIONS      Patient: Mariela Coulter MRN: [id number]  SSN: [id number]              Please take the time to review the following instructions before you leave the hospital and use them as guidelines during your recovery from surgery.  If you have any questions you may contact my office at [phone]  After business hours or during the weekend you can contact me through [employer] [Preferred] or text / call at [phone] (cell phone) for emergency's. Please use the office number during regular business hours.    SPECIAL INSTRUCTIONS :   1. Full extension at the knee is the most important aspect of your range of motion. Avoid placing a pillow or bump behind the knee. Rather, place the heel up on a bump or pillow and allow gravity to help straighten the knee.   2. You may weight bear as tolerated on the knee and during the day you should bend the knee as much as possible.   3. Drainage from the incision more than 4 days from surgery is concerning. Contact my office if there is any question [phone].   4. You may contact me directly through [facility] if there are specific questions or text / call using my cell number [phone].      DRESSING :     Post-op Dressings : This should be removed by physical therapy or you may remove this yourself 7 days after the date of your surgery. If there is no drainage, then a simple dressing may be used or no dressing at all. Other dressing options can be purchased over the counter at a local pharmacy or medical supply vendor.        A porous adhesive dressing such as pictured above can be purchased online (Amazon) or at your local [facility] or [employer]. You only need to keep the incision covered for 7 days after showers. A dressing may be used for longer if there are issues with clothing clinging to the incision.         Showering/ Bathing:    You may shower with the Post-op dressing in place. This is left in place for 7

## 2024-03-18 NOTE — OP NOTE
Lot No. LRB No. Used Action   COMPONENT PAT [id number] THK9MM SUP INFERIOR KNEE TRITANIUM - SN/A  COMPONENT PAT [id number] THK9MM SUP INFERIOR KNEE TRITANIUM N/A [employer] [id number] Left 1 Implanted   BASEPLATE TIB SZ 3 AP44MM ML67MM KNEE TRITANIUM 4 CRUCFRM - SN/A  BASEPLATE TIB SZ 3 AP44MM ML67MM KNEE TRITANIUM 4 CRUCFRM N/A [employer] [facility] [id number] Left 1 Implanted   COMPONENT FEM SZ 3 L KNEE CRUCE RET CEMENTLESS BEAD W/ DORON - SN/A  COMPONENT FEM SZ 3 L KNEE CRUCE RET CEMENTLESS BEAD W/ DORON N/A [employer] [id number] Left 1 Implanted   INSERT TIB BEAR SZ 3 THK9MM KNEE X3 CRUCE RET TRIATHLON - SN/A  INSERT TIB BEAR SZ 3 THK9MM KNEE X3 CRUCE RET TRIATHLON N/A [employer] [id number] Left 1 Implanted       POST OPERATIVE CONSIDERATIONS :  WBAT    JUSTIFICATION FOR SURGICAL ASSISTANT:   Surgical Assistant, was requried and necessary in this case, to help with soft tissue retraction, extremity positioning, equiment management, implant management, and wound closure.    Amor Bell MD

## 2024-03-18 NOTE — ANESTHESIA PROCEDURE NOTES
Spinal Block    Patient location during procedure: pre-op  End time: 3/18/2024 11:06 AM  Reason for block: post-op pain management, primary anesthetic and at surgeon's request  Staffing  Resident/CRNA: Lauryn Light APRN - STEPHEN  Performed by: Lauryn Light APRN - CRNA  Authorized by: Gareth Girard MD    Spinal Block  Patient position: sitting  Prep: DuraPrep  Patient monitoring: cardiac monitor, continuous pulse ox, continuous capnometry, frequent blood pressure checks and oxygen  Approach: midline  Location: L3/L4  Provider prep: mask and sterile gloves  Needle  Needle type: Pencan   Needle gauge: 25 G  Needle length: 3.5 in  Assessment  Swirl obtained: Yes  CSF: clear  Attempts: 2  Hemodynamics: stable  Preanesthetic Checklist  Completed: patient identified, IV checked, site marked, risks and benefits discussed, surgical/procedural consents, pre-op evaluation, timeout performed, anesthesia consent given, oxygen available and monitors applied/VS acknowledged

## 2024-03-18 NOTE — ANESTHESIA PROCEDURE NOTES
Peripheral Block    Patient location during procedure: pre-op  Reason for block: procedure for pain, post-op pain management, primary anesthetic and at surgeon's request  Start time: 3/18/2024 10:49 AM  End time: 3/18/2024 10:57 AM  Staffing  Performed: anesthesiologist   Anesthesiologist: Gareth Girard MD  Performed by: Gareth Girard MD  Authorized by: Gareth Girard MD    Preanesthetic Checklist  Completed: patient identified, IV checked, site marked, risks and benefits discussed, surgical/procedural consents, pre-op evaluation, timeout performed, anesthesia consent given, oxygen available and monitors applied/VS acknowledged  Peripheral Block   Patient position: supine  Prep: ChloraPrep  Provider prep: mask and sterile gloves  Patient monitoring: cardiac monitor, continuous pulse ox, continuous capnometry, frequent blood pressure checks, IV access, oxygen and responsive to questions  Block type: iPacks  Laterality: left  Injection technique: single-shot  Guidance: ultrasound guided    Needle   Needle type: Other   Needle gauge: 21 G  Needle localization: ultrasound guidance  Needle length: 10 cmOther needle type: STIMUPLEX  Assessment   Injection assessment: negative aspiration for heme, no paresthesia on injection, local visualized surrounding nerve on ultrasound and no intravascular symptoms  Hemodynamics: stable  Outcomes: patient tolerated procedure well    Additional Notes  Sumi THOMPSON witnessed timeout and block written on correct side.

## 2024-03-19 VITALS
RESPIRATION RATE: 16 BRPM | BODY MASS INDEX: 34.19 KG/M2 | WEIGHT: 212.74 LBS | HEIGHT: 66 IN | OXYGEN SATURATION: 98 % | TEMPERATURE: 98.8 F | DIASTOLIC BLOOD PRESSURE: 56 MMHG | SYSTOLIC BLOOD PRESSURE: 111 MMHG | HEART RATE: 94 BPM

## 2024-03-19 LAB
ANION GAP SERPL CALC-SCNC: 7 MMOL/L (ref 5–15)
BUN SERPL-MCNC: 14 MG/DL (ref 6–20)
BUN/CREAT SERPL: 17 (ref 12–20)
CALCIUM SERPL-MCNC: 8.7 MG/DL (ref 8.5–10.1)
CHLORIDE SERPL-SCNC: 110 MMOL/L (ref 97–108)
CO2 SERPL-SCNC: 23 MMOL/L (ref 21–32)
CREAT SERPL-MCNC: 0.84 MG/DL (ref 0.55–1.02)
GLUCOSE BLD STRIP.AUTO-MCNC: 154 MG/DL (ref 65–117)
GLUCOSE BLD STRIP.AUTO-MCNC: 179 MG/DL (ref 65–117)
GLUCOSE SERPL-MCNC: 181 MG/DL (ref 65–100)
HCT VFR BLD AUTO: 30 % (ref 35–47)
HGB BLD-MCNC: 9.6 G/DL (ref 11.5–16)
POTASSIUM SERPL-SCNC: 3.8 MMOL/L (ref 3.5–5.1)
SERVICE CMNT-IMP: ABNORMAL
SERVICE CMNT-IMP: ABNORMAL
SODIUM SERPL-SCNC: 140 MMOL/L (ref 136–145)

## 2024-03-19 PROCEDURE — 97116 GAIT TRAINING THERAPY: CPT

## 2024-03-19 PROCEDURE — 85014 HEMATOCRIT: CPT

## 2024-03-19 PROCEDURE — 2580000003 HC RX 258: Performed by: PHYSICIAN ASSISTANT

## 2024-03-19 PROCEDURE — 82962 GLUCOSE BLOOD TEST: CPT

## 2024-03-19 PROCEDURE — 6360000002 HC RX W HCPCS: Performed by: PHYSICIAN ASSISTANT

## 2024-03-19 PROCEDURE — 6370000000 HC RX 637 (ALT 250 FOR IP): Performed by: PHYSICIAN ASSISTANT

## 2024-03-19 PROCEDURE — APPNB45 APP NON BILLABLE 31-45 MINUTES: Performed by: NURSE PRACTITIONER

## 2024-03-19 PROCEDURE — 36415 COLL VENOUS BLD VENIPUNCTURE: CPT

## 2024-03-19 PROCEDURE — 80048 BASIC METABOLIC PNL TOTAL CA: CPT

## 2024-03-19 PROCEDURE — 6370000000 HC RX 637 (ALT 250 FOR IP): Performed by: NURSE PRACTITIONER

## 2024-03-19 PROCEDURE — 85018 HEMOGLOBIN: CPT

## 2024-03-19 PROCEDURE — 94761 N-INVAS EAR/PLS OXIMETRY MLT: CPT

## 2024-03-19 PROCEDURE — G0378 HOSPITAL OBSERVATION PER HR: HCPCS

## 2024-03-19 PROCEDURE — 97110 THERAPEUTIC EXERCISES: CPT

## 2024-03-19 PROCEDURE — 97165 OT EVAL LOW COMPLEX 30 MIN: CPT | Performed by: OCCUPATIONAL THERAPIST

## 2024-03-19 PROCEDURE — 97535 SELF CARE MNGMENT TRAINING: CPT | Performed by: OCCUPATIONAL THERAPIST

## 2024-03-19 RX ORDER — TRAMADOL HYDROCHLORIDE 50 MG/1
50 TABLET ORAL EVERY 6 HOURS PRN
Qty: 20 TABLET | Refills: 0 | Status: SHIPPED | OUTPATIENT
Start: 2024-03-19 | End: 2024-03-24

## 2024-03-19 RX ORDER — TRAMADOL HYDROCHLORIDE 50 MG/1
50 TABLET ORAL EVERY 6 HOURS PRN
Status: DISCONTINUED | OUTPATIENT
Start: 2024-03-19 | End: 2024-03-19 | Stop reason: HOSPADM

## 2024-03-19 RX ADMIN — TRAMADOL HYDROCHLORIDE 50 MG: 50 TABLET ORAL at 12:20

## 2024-03-19 RX ADMIN — OXYCODONE 5 MG: 5 TABLET ORAL at 08:12

## 2024-03-19 RX ADMIN — ACETAMINOPHEN 650 MG: 325 TABLET ORAL at 08:11

## 2024-03-19 RX ADMIN — CEFAZOLIN 2000 MG: 2 INJECTION, POWDER, FOR SOLUTION INTRAMUSCULAR; INTRAVENOUS at 01:10

## 2024-03-19 RX ADMIN — ACETAMINOPHEN 650 MG: 325 TABLET ORAL at 03:27

## 2024-03-19 RX ADMIN — SODIUM CHLORIDE, PRESERVATIVE FREE 10 ML: 5 INJECTION INTRAVENOUS at 08:14

## 2024-03-19 RX ADMIN — METFORMIN HYDROCHLORIDE 500 MG: 500 TABLET, EXTENDED RELEASE ORAL at 08:12

## 2024-03-19 RX ADMIN — POLYETHYLENE GLYCOL 3350 17 G: 17 POWDER, FOR SOLUTION ORAL at 08:11

## 2024-03-19 RX ADMIN — APIXABAN 2.5 MG: 2.5 TABLET, FILM COATED ORAL at 08:12

## 2024-03-19 RX ADMIN — Medication 1 TABLET: at 08:12

## 2024-03-19 ASSESSMENT — PAIN SCALES - GENERAL
PAINLEVEL_OUTOF10: 3
PAINLEVEL_OUTOF10: 5
PAINLEVEL_OUTOF10: 4
PAINLEVEL_OUTOF10: 2
PAINLEVEL_OUTOF10: 3

## 2024-03-19 ASSESSMENT — PAIN DESCRIPTION - ORIENTATION
ORIENTATION: LEFT

## 2024-03-19 ASSESSMENT — PAIN DESCRIPTION - DESCRIPTORS
DESCRIPTORS: ACHING;SORE
DESCRIPTORS: ACHING

## 2024-03-19 ASSESSMENT — PAIN DESCRIPTION - LOCATION
LOCATION: KNEE

## 2024-03-19 NOTE — CARE COORDINATION
Case Management Discharge Note    Discharge Plan:  DC to home today with [facility] services x2 weeks then Outpatient PT/OT per MD.  [facility]--Accepted.  SOC Date:  3/20/2024.  Patient's son is at bedside and is assisting with transportation.    Pt was admitted on 3/18/2024 for L TKA. CM met with Pt to complete initial assessment. CM introduced self, CM role, and verified demographics.     Pt has no hx of HH, home O2, or equipment. Pt is independent with ADLs and ambulation. Denies problems with either.     Pharmacy:  [facility]  AMD: On file    Patient is agreeable to [facility] services and stated that she does not have a [facility] agency preference.  Referral sent and confirmed acceptance.         03/19/24 1443   Discharge Planning   Patient expects to be discharged to: House   Services At/After Discharge   Transition of Care Consult (CM Consult) Home Health   Services At/After Discharge Home Health    Resource Information Provided? No   Mode of Transport at Discharge Self   Confirm Follow Up Transport Family   Condition of Participation: Discharge Planning   The Plan for Transition of Care is related to the following treatment goals: Home with [facility] PT/OT   The Patient and/or Patient Representative was provided with a Choice of Provider? Patient   The Patient and/Or Patient Representative agree with the Discharge Plan? Yes   Freedom of Choice list was provided with basic dialogue that supports the patient's individualized plan of care/goals, treatment preferences, and shares the quality data associated with the providers?  Yes       ______________________________________  CIELO Perera, RN-CM  [facility]- Care Management  Available via [employer]asm[employer]  3/19/2024  2:47 PM

## 2024-03-19 NOTE — PLAN OF CARE
Problem: Physical Therapy - Adult  Goal: By Discharge: Performs mobility at highest level of function for planned discharge setting.  See evaluation for individualized goals.  Description: FUNCTIONAL STATUS PRIOR TO ADMISSION: Patient was independent and active without use of DME.    HOME SUPPORT PRIOR TO ADMISSION: The patient lived alone with son/sister helping for 1-2 days at discharge to provide assistance.    Physical Therapy Goals  Initiated 3/18/2024  1.  Patient will move from supine to sit and sit to supine in bed with independence within 4 day(s).    2.  Patient will perform sit to stand with independence within 4 day(s).  3.  Patient will transfer from bed to chair and chair to bed with modified independence using the least restrictive device within 4 day(s).  4.  Patient will ambulate with modified independence for 200 feet with the least restrictive device within 4 day(s).   5.  Patient will ascend/descend 6 stairs with 2 handrail(s) with modified independence within 4 day(s).  6. Patient will perform knee home exercise program per protocol with independence within 4 days.  7. Patient will demonstrate AROM 0-90 degrees in operative joint within 4 days.      3/19/2024 1435 by Doris Fields, PTA  Outcome: Progressing  3/19/2024 1107 by Doris Fields, PTA  Outcome: Progressing   PHYSICAL THERAPY TREATMENT    Patient: Mariela Coulter (78 y.o. female)  Date: 3/19/2024  Diagnosis: Patellofemoral arthritis of left knee [M17.12]  Primary osteoarthritis of left knee [M17.12] Primary osteoarthritis of left knee  Procedure(s) (LRB):  LEFT TOTAL KNEE ARTHROPLASTY (TRAM) (Left) 1 Day Post-Op  Precautions: Fall Risk, Weight Bearing   Left Lower Extremity Weight Bearing: Weight Bearing As Tolerated                  ASSESSMENT:  Patient continues to benefit from skilled PT services and is progressing towards goals. demonstrates antalgic gait, No knee instability noted during activity. Occasional verbal cues 
  Problem: Safety - Adult  Goal: Free from fall injury  3/19/2024 1007 by Al Wing RN  Outcome: Progressing  3/19/2024 0341 by Joel Justice LPN  Outcome: Progressing     Problem: Pain  Goal: Verbalizes/displays adequate comfort level or baseline comfort level  3/19/2024 1007 by Al Wing RN  Outcome: Progressing  3/19/2024 0341 by Joel Justice LPN  Outcome: Progressing     Problem: Discharge Planning  Goal: Discharge to home or other facility with appropriate resources  3/19/2024 1007 by Al Wing RN  Outcome: Progressing  3/19/2024 0341 by Joel Justice LPN  Outcome: Progressing     Problem: ABCDS Injury Assessment  Goal: Absence of physical injury  3/19/2024 1007 by Al Wing RN  Outcome: Progressing  3/19/2024 0341 by Joel Justice LPN  Outcome: Progressing     
  Problem: Safety - Adult  Goal: Free from fall injury  Outcome: Progressing     Problem: Pain  Goal: Verbalizes/displays adequate comfort level or baseline comfort level  Outcome: Progressing     Problem: Physical Therapy - Adult  Goal: By Discharge: Performs mobility at highest level of function for planned discharge setting.  See evaluation for individualized goals.  Description: FUNCTIONAL STATUS PRIOR TO ADMISSION: Patient was independent and active without use of DME.    HOME SUPPORT PRIOR TO ADMISSION: The patient lived alone with son/sister helping for 1-2 days at discharge to provide assistance.    Physical Therapy Goals  Initiated 3/18/2024  1.  Patient will move from supine to sit and sit to supine in bed with independence within 4 day(s).    2.  Patient will perform sit to stand with independence within 4 day(s).  3.  Patient will transfer from bed to chair and chair to bed with modified independence using the least restrictive device within 4 day(s).  4.  Patient will ambulate with modified independence for 200 feet with the least restrictive device within 4 day(s).   5.  Patient will ascend/descend 6 stairs with 2 handrail(s) with modified independence within 4 day(s).  6. Patient will perform knee home exercise program per protocol with independence within 4 days.  7. Patient will demonstrate AROM 0-90 degrees in operative joint within 4 days.      3/18/2024 1712 by Renee Gutiérrez, PT  Outcome: Progressing     Problem: Discharge Planning  Goal: Discharge to home or other facility with appropriate resources  Outcome: Progressing     Problem: ABCDS Injury Assessment  Goal: Absence of physical injury  Outcome: Progressing     
stair training this afternoon.          PLAN:  Patient continues to benefit from skilled intervention to address the above impairments.  Continue treatment per established plan of care.    Recommendation for discharge: (in order for the patient to meet his/her long term goals): Therapy 2 days/week in the home and also see \"other factors to consider\" below for additional discharge concerns/needs    Other factors to consider for discharge: lives alone, available support system works or is unable to provide adequate supervision and the patient would be alone, and concern for safely navigating or managing the home environment    IF patient discharges home will need the following DME: patient owns DME required for discharge       SUBJECTIVE:   Patient stated, \"I need ice.\"    OBJECTIVE DATA SUMMARY:   Critical Behavior:  Orientation  Overall Orientation Status: Within Normal Limits  Orientation Level: Oriented X4  Cognition  Overall Cognitive Status: WNL    Functional Mobility Training:  Bed Mobility:  Bed Mobility Training  Bed Mobility Training: No  Transfers:  Transfer Training  Transfer Training: Yes  Overall Level of Assistance: Stand-by assistance;Contact-guard assistance;Assist X1;Additional time  Interventions: Safety awareness training;Verbal cues;Weight shifting training/pressure relief  Sit to Stand: Stand-by assistance  Stand to Sit: Stand-by assistance  Bed to Chair: Stand-by assistance  Toilet Transfer: Stand-by assistance (RW and toilet in bathroom)  Balance:  Balance  Sitting: Intact  Standing: Impaired  Standing - Static: Constant support;Good  Standing - Dynamic: Constant support;Fair   Ambulation/Gait Training:     Gait  Distance (ft): 50 Feet  Assistive Device: Walker, rolling;Gait belt  Base of Support: Widened  Speed/Daiana: Slow  Gait Abnormalities: Antalgic;Decreased step clearance;Step to gait  Neuro Re-Education:                                                                                    
23  AM-PAC Inpatient T-Scale Score : 56.93     Cutoff score ?171,2,3 had higher odds of discharging home with home health or need of SNF/IPR.    1. Angela Cottrell, Lila Rosen, Brendon Brooks, Mariela Hopkins, Edwin Vera, Avtar Cottrell.  Validity of the AM-PAC “6-Clicks” Inpatient Daily Activity and Basic Mobility Short Forms. Physical Therapy Mar 2014, [phone]) 379-391; DOI: 10.2522/ptj.73965528  2. Oriana Ponce, Joanna MORENO, Remy MORENO. Association of AM-PAC \"6-Clicks\" Basic Mobility and Daily Activity Scores With Discharge Destination. Phys Ther. 2021 4;101(4):nnkr604. doi: 10.1093/ptj/uqdq206. PMID: 56470896.  3. Joo MORENO, Radha D, Cathy S, Odin K, Amarilis S. Activity Measure for Post-Acute Care \"6-Clicks\" Basic Mobility Scores Predict Discharge Destination After Acute Care Hospitalization in Select Patient Groups: A Retrospective, Observational Study. Arch Rehabil Res Clin Transl. 2022 16;4(3):940107. doi: 10.1016/j.arrct.2.591250. PMID: 20942563; PMCID: [id number].  4. Simba PICKETT, Rosalee S, Poli W, Joycelyn P. AM-PAC Short Forms Manual 4.0. Revised 2020.                                                                                                                                                                                                                               Pain Ratin/10   Pain Intervention(s):   pain is at a level acceptable to the patient    Activity Tolerance:   Good    After treatment:   Patient left in no apparent distress in bed, Call bell within reach, and Side rails x3    COMMUNICATION/EDUCATION:   The patient's plan of care was discussed with: registered nurse         Thank you for this referral.  Renee Gutiérrez, PT  Minutes: 25

## 2024-03-19 NOTE — PROGRESS NOTES
Orthopaedic Progress Note  Post Op day: 1 Day Post-Op    2024 1:12 PM     Patient: Mariela Coulter MRN: [id number]  SSN: [id number]    YOB: 1945  Age: 78 y.o.  Sex: female      Admit date:  3/18/2024  Date of Surgery:  [unfilled]   Procedures:  Procedure(s):  LEFT TOTAL KNEE ARTHROPLASTY (TRAM)  Admitting Physician:  Amor Bell MD   Surgeon:  Surgeon(s) and Role:     * Amor Bell MD - Primary    Consulting Physician(s): Treatment Team: Attending Provider: Amor Bell MD; Surgeon: Amor Bell MD; Occupational Therapist: Ava Calderon OT; Registered Nurse: Al Wing RN; Physical Therapist Assistant: Doris Fields PTA; Patient Care Tech: Adolfo Appiah; : Sarah Gonzáles    SUBJECTIVE:     Mariela Coulter is a 78 y.o. female is 1 Day Post-Op s/p Procedure(s):  LEFT TOTAL KNEE ARTHROPLASTY (TRAM) with an appropriate level of post-operative pain.  No complaints of nausea, vomiting, dizziness, lightheadedness, chest pain, or shortness of breath.    OBJECTIVE:       Physical Exam:  General: Alert, cooperative, no distress.    Respiratory: Respirations unlabored  Neurological:  Neurovascular exam within normal limits. Motor: EHL/DF/PF 5/5, SILT  Musculoskeletal: Calves soft, supple, non-tender upon palpation.  Dressing/Wound:  Clean, dry and intact. No significant erythema or swelling.      Vital Signs:      Patient Vitals for the past 8 hrs:   BP Temp Temp src Pulse Resp SpO2   24 1112 (!) 124/51 98.8 °F (37.1 °C) Oral 80 16 98 %   24 0800 (!) 113/54 98.8 °F (37.1 °C) Oral 80 14 --                                          Temp (24hrs), Av.4 °F (36.9 °C), Min:98.1 °F (36.7 °C), Max:98.8 °F (37.1 °C)      Labs:        Recent Labs     24  0221   HCT 30.0*   HGB 9.6*     Lab Results   Component Value Date/Time     2024 02:21 AM    K 3.8 2024 02:21 AM     2024 02:21 AM    CO2 23 2024 
  Rounded on patient  Patient alert and oriented  Follow up from pre-op Joint Patient Education Class.   Patient states class information was valuable in preparing for surgery.   Patient states their home space is prepared and safe for recovery.   Reviewed plan of care with patient, including pain management, positioning, mobility precautions,  Ice application, leg positioning, exercises and incentive spirometry use.   Reviewed call don't fall expectations.  Opportunity provided for patient to ask questions and provide comments.  Questions answered.  
Discharge instructions presented to patient. All questions and concerns addressed appropriately. New medications were read and explained to patient, patient verbalized understanding. Patient aware that prescriptions have been electronically sent to their pharmacy. All IV's removed. Patient belongings packed up and with patient. Patient awaiting transport via wheelchair by volunteer services to the main entrance.     
OCCUPATIONAL THERAPY EVALUATION/DISCHARGE  Patient: Mariela Coulter (78 y.o. female)  Date: 3/19/2024  Primary Diagnosis: Patellofemoral arthritis of left knee [M17.12]  Primary osteoarthritis of left knee [M17.12]  Procedure(s) (LRB):  LEFT TOTAL KNEE ARTHROPLASTY (TRAM) (Left) 1 Day Post-Op     Precautions: Fall Risk, Weight Bearing   Left Lower Extremity Weight Bearing: Weight Bearing As Tolerated              ASSESSMENT :  Based on the objective data below, the patient presents at an overall SBA level with toileting and functional mobility mobility amb with RW and min A for LE ADLs to reach toes on L foot POD 1 L TKA.  Pt with good safety awareness and no LOB despite significant LLE pain 7/10.  Per pt her son and/or sister will be staying with her for a couple days.  Discharge follow-up per MD.    Functional Outcome Measure:  The patient scored 21/24 on the ADL AM-PAC outcome measure.      Further skilled acute occupational therapy is not indicated at this time.     PLAN :  Recommend with staff: up with RW and assist for safety    Recommendation for discharge: (in order for the patient to meet his/her long term goals): Per MD- pt would benefit from HH OT    Other factors to consider for discharge: available support system works or is unable to provide adequate supervision and the patient would be alone and concern for safely navigating or managing the home environment    IF patient discharges home will need the following DME: rolling walker     SUBJECTIVE:   Patient stated, “My family will help me for a little.”    OBJECTIVE DATA SUMMARY:     Past Medical History:   Diagnosis Date    Anesthesia complication     early awakening during right knee replacement    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     
86 15 97 % -- --   03/18/24 1006 (!) 150/64 98.5 °F (36.9 °C) Oral 85 16 96 % 1.676 m (5' 6\") 96.5 kg (212 lb 11.9 oz)        Alert and oriented x3.    Examination of the  left  knee reveals that the dressing is clean, dry and intact.   Motor Exam : Pt is able to perform a straight leg raise.   Sensation is intact to light touch.    No calf pain.        MEDS / LABS :     [unfilled]     Labs:  Recent Labs     03/19/24  0221   HGB 9.6*   HCT 30.0*      K 3.8   *   CO2 23   BUN 14      Patient mobility           Amor Bell MD  Cell [phone]  Scooby Gerard PA-C  Cell [phone]  Medical Assistants: Samanta Fernandez & Lurdes Chavez [phone]                 Surgery Scheduler: ROGERIO Flores [phone] ext [phone]   
Normal for race

## 2024-05-01 DIAGNOSIS — E11.65 TYPE 2 DIABETES MELLITUS WITH HYPERGLYCEMIA, WITHOUT LONG-TERM CURRENT USE OF INSULIN (HCC): ICD-10-CM

## 2024-05-01 DIAGNOSIS — E66.01 MORBID (SEVERE) OBESITY DUE TO EXCESS CALORIES (HCC): ICD-10-CM

## 2024-05-01 DIAGNOSIS — I10 ESSENTIAL (PRIMARY) HYPERTENSION: ICD-10-CM

## 2024-05-01 DIAGNOSIS — E78.2 MIXED HYPERLIPIDEMIA: ICD-10-CM

## 2024-05-31 ENCOUNTER — NURSE ONLY (OUTPATIENT)
Age: 79
End: 2024-05-31

## 2024-05-31 DIAGNOSIS — E78.2 MIXED HYPERLIPIDEMIA: ICD-10-CM

## 2024-05-31 DIAGNOSIS — E11.65 TYPE 2 DIABETES MELLITUS WITH HYPERGLYCEMIA, WITHOUT LONG-TERM CURRENT USE OF INSULIN (HCC): ICD-10-CM

## 2024-05-31 DIAGNOSIS — E11.65 TYPE 2 DIABETES MELLITUS WITH HYPERGLYCEMIA, WITHOUT LONG-TERM CURRENT USE OF INSULIN (HCC): Primary | ICD-10-CM

## 2024-05-31 LAB
ALBUMIN SERPL-MCNC: 3.7 G/DL (ref 3.5–5)
ALBUMIN/GLOB SERPL: 1.3 (ref 1.1–2.2)
ALP SERPL-CCNC: 79 U/L (ref 45–117)
ALT SERPL-CCNC: 23 U/L (ref 12–78)
ANION GAP SERPL CALC-SCNC: 5 MMOL/L (ref 5–15)
AST SERPL-CCNC: 27 U/L (ref 15–37)
BILIRUB SERPL-MCNC: 1.1 MG/DL (ref 0.2–1)
BUN SERPL-MCNC: 16 MG/DL (ref 6–20)
BUN/CREAT SERPL: 27 (ref 12–20)
CALCIUM SERPL-MCNC: 9.9 MG/DL (ref 8.5–10.1)
CHLORIDE SERPL-SCNC: 105 MMOL/L (ref 97–108)
CHOLEST SERPL-MCNC: 124 MG/DL
CO2 SERPL-SCNC: 28 MMOL/L (ref 21–32)
CREAT SERPL-MCNC: 0.59 MG/DL (ref 0.55–1.02)
CREAT UR-MCNC: 42.6 MG/DL
EST. AVERAGE GLUCOSE BLD GHB EST-MCNC: 137 MG/DL
GLOBULIN SER CALC-MCNC: 2.9 G/DL (ref 2–4)
GLUCOSE SERPL-MCNC: 109 MG/DL (ref 65–100)
HBA1C MFR BLD: 6.4 % (ref 4–5.6)
HDLC SERPL-MCNC: 41 MG/DL
HDLC SERPL: 3 (ref 0–5)
LDLC SERPL CALC-MCNC: 55.4 MG/DL (ref 0–100)
MICROALBUMIN UR-MCNC: <0.5 MG/DL
MICROALBUMIN/CREAT UR-RTO: <12 MG/G (ref 0–30)
POTASSIUM SERPL-SCNC: 4.3 MMOL/L (ref 3.5–5.1)
PROT SERPL-MCNC: 6.6 G/DL (ref 6.4–8.2)
SODIUM SERPL-SCNC: 138 MMOL/L (ref 136–145)
TRIGL SERPL-MCNC: 138 MG/DL
VLDLC SERPL CALC-MCNC: 27.6 MG/DL

## 2024-06-06 ENCOUNTER — OFFICE VISIT (OUTPATIENT)
Age: 79
End: 2024-06-06

## 2024-06-06 VITALS
BODY MASS INDEX: 34.07 KG/M2 | TEMPERATURE: 97.2 F | WEIGHT: 212 LBS | SYSTOLIC BLOOD PRESSURE: 143 MMHG | OXYGEN SATURATION: 98 % | HEIGHT: 66 IN | HEART RATE: 74 BPM | DIASTOLIC BLOOD PRESSURE: 64 MMHG

## 2024-06-06 DIAGNOSIS — M81.0 AGE-RELATED OSTEOPOROSIS WITHOUT CURRENT PATHOLOGICAL FRACTURE: ICD-10-CM

## 2024-06-06 DIAGNOSIS — E11.65 TYPE 2 DIABETES MELLITUS WITH HYPERGLYCEMIA, WITHOUT LONG-TERM CURRENT USE OF INSULIN (HCC): Primary | ICD-10-CM

## 2024-06-06 DIAGNOSIS — E78.2 MIXED HYPERLIPIDEMIA: ICD-10-CM

## 2024-06-06 DIAGNOSIS — I10 ESSENTIAL (PRIMARY) HYPERTENSION: ICD-10-CM

## 2024-06-06 DIAGNOSIS — E66.01 MORBID (SEVERE) OBESITY DUE TO EXCESS CALORIES (HCC): ICD-10-CM

## 2024-06-06 RX ORDER — SEMAGLUTIDE 0.68 MG/ML
INJECTION, SOLUTION SUBCUTANEOUS
Qty: 9 ML | Refills: 1 | Status: SHIPPED | OUTPATIENT
Start: 2024-06-06

## 2024-06-06 RX ORDER — SEMAGLUTIDE 0.68 MG/ML
INJECTION, SOLUTION SUBCUTANEOUS
Qty: 9 ML | Refills: 1 | COMMUNITY
Start: 2024-06-06 | End: 2024-06-06 | Stop reason: SDUPTHER

## 2024-06-06 NOTE — PATIENT INSTRUCTIONS
SPECIFIC INSTRUCTIONS BELOW     Stop  trulicity   Start on ozempic  0.5 mg a week   metformin er  ONE   pills twice a day with meals         -------------PAY ATTENTION TO THESE GENERAL INSTRUCTIONS -----------------      - The medications prescribed at this visit will not be available at pharmacy until 6 pm       - YOUR MED LIST IS NOT UP TO DATE AS SOME CHANGES ARE BEING MADE AFTER THE VISIT - FOLLOW SPECIFIC INSTRUCTIONS  ABOVE     -ANY tests other than blood work, which you opt to do  outside the  [facility] facilities, you are responsible for prior authorizations if  required    - HEALTH MAINTENANCE IS NOT GOING TO BE UP TO DATE ON YOUR AVS- PLEASE IGNORE     Results     *Normal results will not be notified by a phone call starting January 1 2021   *If you have an upcoming visit, the results will be discussed at the visit   *Please sign up for MY CHART if you want access to your lab and test results  *Abnormal results which require immediate attention will be notified by phone call   *Abnormal results which do not require immediate assistance will be notified in 1-2 weeks       Refills    -    have your pharmacy send us a refill request . Refills are done max for one year and a visit is a must before refills are extended    Follow up appointments -  highly encourage you to make it when you are checking out. We can accommodate you into the schedule based on your clinical situation, but not for extending refills beyond a year. Labs are important to give refills and is important to get labs before the visit     Phone calls  -  Allow  24 hrs. for non-urgent calls to be returned  Prior authorization - It may take 2-4 weeks to process  Forms  -  FMLA, DMV etc., will take up to 2 weeks to process  Cancellations - please notify the office 2 days in advance   Samples  - will only be dispensed at visits       If not showing for the appointments and cancelling appointments within 24 hours are kept track of and

## 2024-06-06 NOTE — PROGRESS NOTES
[facility] DIABETES AND ENDOCRINOLOGY              Bouchra Castro MD FACE     HISTORY OF PRESENT ILLNESS  Mariela Coulter is a 79  y.o. female.  HPI  Patient here for  6 month   F/u after last  visit of Type 2 diabetes mellitus  And osteoporosis  From Nov 2023   H/o pulmonary emboli     S/p knee replacement surgery  -  march 2024    Healed well   LOST 14 lbs         Nov 2023   Patient was hesitant to go for  left knee replacement   Gained 18 lbs       May 2022   Lost 20 lbs since start of trulicity   She is planning for knee surgery    Pt says she was found to have low magnesium and was given OTC magnesium which bother her stomach she says   She had colonoscopy the day before   She had CT scan without contrast   from feb 2022 at [facility] for abdominal pain and tenderness , 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   She never liked this medicaiton  Current monitoring regimen: home blood tests - daily         Review of Systems   Constitutional: Negative.    Psychiatric/Behavioral: Negative for depression and memory loss. The patient does not have insomnia.        Physical Exam   Constitutional: She is oriented to person, place, and time. She appears well-developed and well-nourished.   Psychiatric: She has a normal mood and affect.       Labs      Lab Results   Component Value Date    LABA1C 6.4 (H) 05/31/2024    LABA1C 6.6 (H) 03/04/2024    LABA1C 6.7 (H) 10/25/2023       Lab Results   Component Value Date     05/31/2024    K 4.3 05/31/2024     05/31/2024    CO2 28 05/31/2024    BUN 16 05/31/2024    CREATININE 0.59 05/31/2024    GLUCOSE 109 (H) 05/31/2024    CALCIUM 9.9 05/31/2024    BILITOT 1.1 (H) 05/31/2024    ALKPHOS 79 05/31/2024    AST 27 05/31/2024

## 2024-06-20 ENCOUNTER — TELEPHONE (OUTPATIENT)
Age: 79
End: 2024-06-20

## 2024-06-20 NOTE — TELEPHONE ENCOUNTER
Patient would like a nurse to contact her when she could come in and review how to use ozempic pen

## 2024-09-03 DIAGNOSIS — E11.65 TYPE 2 DIABETES MELLITUS WITH HYPERGLYCEMIA, WITHOUT LONG-TERM CURRENT USE OF INSULIN (HCC): Primary | ICD-10-CM

## 2024-09-09 RX ORDER — DULAGLUTIDE 0.75 MG/.5ML
0.75 INJECTION, SOLUTION SUBCUTANEOUS WEEKLY
Qty: 6 ML | Refills: 3 | Status: SHIPPED | OUTPATIENT
Start: 2024-09-09

## 2024-09-11 ENCOUNTER — TELEPHONE (OUTPATIENT)
Age: 79
End: 2024-09-11

## 2024-11-04 ENCOUNTER — HOSPITAL ENCOUNTER (OUTPATIENT)
Facility: HOSPITAL | Age: 79
Discharge: HOME | End: 2024-11-07
Attending: INTERNAL MEDICINE
Payer: MEDICARE

## 2024-11-04 DIAGNOSIS — M81.0 AGE-RELATED OSTEOPOROSIS WITHOUT CURRENT PATHOLOGICAL FRACTURE: ICD-10-CM

## 2024-11-04 DIAGNOSIS — E11.65 TYPE 2 DIABETES MELLITUS WITH HYPERGLYCEMIA, WITHOUT LONG-TERM CURRENT USE OF INSULIN (HCC): ICD-10-CM

## 2024-11-04 PROCEDURE — 77080 DXA BONE DENSITY AXIAL: CPT

## 2024-12-06 ENCOUNTER — OFFICE VISIT (OUTPATIENT)
Age: 79
End: 2024-12-06
Payer: MEDICARE

## 2024-12-06 VITALS
TEMPERATURE: 97.5 F | WEIGHT: 212.8 LBS | HEART RATE: 70 BPM | DIASTOLIC BLOOD PRESSURE: 69 MMHG | OXYGEN SATURATION: 96 % | BODY MASS INDEX: 34.2 KG/M2 | SYSTOLIC BLOOD PRESSURE: 132 MMHG | HEIGHT: 66 IN

## 2024-12-06 DIAGNOSIS — E11.65 TYPE 2 DIABETES MELLITUS WITH HYPERGLYCEMIA, WITHOUT LONG-TERM CURRENT USE OF INSULIN (HCC): ICD-10-CM

## 2024-12-06 DIAGNOSIS — E78.2 MIXED HYPERLIPIDEMIA: ICD-10-CM

## 2024-12-06 DIAGNOSIS — M81.0 SENILE OSTEOPOROSIS: Primary | ICD-10-CM

## 2024-12-06 DIAGNOSIS — I10 ESSENTIAL (PRIMARY) HYPERTENSION: ICD-10-CM

## 2024-12-06 DIAGNOSIS — E66.01 MORBID (SEVERE) OBESITY DUE TO EXCESS CALORIES: ICD-10-CM

## 2024-12-06 LAB — HBA1C MFR BLD: 6.8 %

## 2024-12-06 PROCEDURE — 1125F AMNT PAIN NOTED PAIN PRSNT: CPT | Performed by: INTERNAL MEDICINE

## 2024-12-06 PROCEDURE — 1090F PRES/ABSN URINE INCON ASSESS: CPT | Performed by: INTERNAL MEDICINE

## 2024-12-06 PROCEDURE — G8484 FLU IMMUNIZE NO ADMIN: HCPCS | Performed by: INTERNAL MEDICINE

## 2024-12-06 PROCEDURE — 1036F TOBACCO NON-USER: CPT | Performed by: INTERNAL MEDICINE

## 2024-12-06 PROCEDURE — PBSHW AMB POC HEMOGLOBIN A1C: Performed by: INTERNAL MEDICINE

## 2024-12-06 PROCEDURE — 99215 OFFICE O/P EST HI 40 MIN: CPT | Performed by: INTERNAL MEDICINE

## 2024-12-06 PROCEDURE — 3044F HG A1C LEVEL LT 7.0%: CPT | Performed by: INTERNAL MEDICINE

## 2024-12-06 PROCEDURE — G8427 DOCREV CUR MEDS BY ELIG CLIN: HCPCS | Performed by: INTERNAL MEDICINE

## 2024-12-06 PROCEDURE — 83036 HEMOGLOBIN GLYCOSYLATED A1C: CPT | Performed by: INTERNAL MEDICINE

## 2024-12-06 PROCEDURE — 3078F DIAST BP <80 MM HG: CPT | Performed by: INTERNAL MEDICINE

## 2024-12-06 PROCEDURE — 1123F ACP DISCUSS/DSCN MKR DOCD: CPT | Performed by: INTERNAL MEDICINE

## 2024-12-06 PROCEDURE — G8417 CALC BMI ABV UP PARAM F/U: HCPCS | Performed by: INTERNAL MEDICINE

## 2024-12-06 PROCEDURE — G8399 PT W/DXA RESULTS DOCUMENT: HCPCS | Performed by: INTERNAL MEDICINE

## 2024-12-06 PROCEDURE — 3075F SYST BP GE 130 - 139MM HG: CPT | Performed by: INTERNAL MEDICINE

## 2024-12-06 RX ORDER — TERIPARATIDE 250 UG/ML
20 INJECTION, SOLUTION SUBCUTANEOUS DAILY
Qty: 2.24 ML | Refills: 11 | Status: SHIPPED | OUTPATIENT
Start: 2024-12-06

## 2024-12-06 RX ORDER — FLURBIPROFEN SODIUM 0.3 MG/ML
1 SOLUTION/ DROPS OPHTHALMIC DAILY
Qty: 100 EACH | Refills: 3 | Status: SHIPPED | OUTPATIENT
Start: 2024-12-06

## 2024-12-06 NOTE — PATIENT INSTRUCTIONS
track of and three  of such situations in  two consecutive years will likely be considered for termination from the practice    -------------------------------------------------------------------------------------------------------------------

## 2024-12-06 NOTE — PROGRESS NOTES
Mariela Coulter is a 79 y.o. female here for   Chief Complaint   Patient presents with    Diabetes    Osteoporosis       1. Have you been to the ER, urgent care clinic since your last visit?  Hospitalized since your last visit? -No    2. Have you seen or consulted any other health care providers outside of the [facility] since your last visit?  Include any pap smears or colon screening.-[facility]

## 2024-12-06 NOTE — PROGRESS NOTES
[facility] DIABETES AND ENDOCRINOLOGY              Bouchra Castro MD FACE     HISTORY OF PRESENT ILLNESS  Mariela Coulter is a 79  y.o. female.  HPI  Patient here for  6 month   F/u after last  visit of Type 2 diabetes mellitus  And osteoporosis  From June 2024   H/o pulmonary emboli     She tried OZEMPIC   in the interim,  she could not get Trulicity   She could not tolerate it because it made her sad and depressed - back to trulicity  now  Weight is stable         June 2024      S/p knee replacement surgery  -  march 2024    Healed well   LOST 14 lbs         Nov 2023   Patient was hesitant to go for  left knee replacement   Gained 18 lbs       May 2022   Lost 20 lbs since start of trulicity   She is planning for knee surgery    Pt says she was found to have low magnesium and was given OTC magnesium which bother her stomach she says   She had colonoscopy the day before   She had CT scan without contrast   from feb 2022 at [facility] for abdominal pain and tenderness , 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   She never liked this medicaiton  Current monitoring regimen: home blood tests - daily         Review of Systems   Constitutional: Negative.    Psychiatric/Behavioral: Negative for depression and memory loss. The patient does not have insomnia.        Physical Exam   Constitutional: She is oriented to person, place, and time. She appears well-developed and well-nourished.   Psychiatric: She has a normal mood and affect.       Labs      Lab Results   Component Value Date    LABA1C 6.4 (H) 05/31/2024    LABA1C 6.6 (H) 03/04/2024    LABA1C 6.7 (H) 10/25/2023       Lab Results   Component Value Date     05/31/2024    K 4.3 05/31/2024     05/31/2024    CO2 28

## 2024-12-10 ENCOUNTER — TELEPHONE (OUTPATIENT)
Age: 79
End: 2024-12-10

## 2025-01-21 ENCOUNTER — TELEPHONE (OUTPATIENT)
Age: 80
End: 2025-01-21

## 2025-01-21 NOTE — TELEPHONE ENCOUNTER
[facility] but states no pen needles called in she wants them sent asap as she cannot continually drive there to . Patient advised I will not pull physician out of room but would notify her nurse of the request. Patient states she will wait at pharmacy.

## 2025-01-21 NOTE — TELEPHONE ENCOUNTER
Informed pt that the pharmacist said the Forteo and pen needles where picked up today. They were both sent to the pharmacy on 12/6/2024. Pt said the pharmacy found the order

## 2025-03-31 ENCOUNTER — TELEPHONE (OUTPATIENT)
Age: 80
End: 2025-03-31

## 2025-03-31 DIAGNOSIS — E11.65 TYPE 2 DIABETES MELLITUS WITH HYPERGLYCEMIA, WITHOUT LONG-TERM CURRENT USE OF INSULIN (HCC): ICD-10-CM

## 2025-03-31 PROBLEM — I72.2 RENAL ARTERIAL ANEURYSM: Status: ACTIVE | Noted: 2023-11-03

## 2025-03-31 PROBLEM — D12.6 TUBULAR ADENOMA OF COLON: Status: ACTIVE | Noted: 2025-03-31

## 2025-03-31 PROBLEM — E55.9 VITAMIN D DEFICIENCY: Status: ACTIVE | Noted: 2023-11-03

## 2025-03-31 PROBLEM — E53.8 B12 DEFICIENCY: Status: ACTIVE | Noted: 2023-11-03

## 2025-03-31 PROBLEM — E78.00 PURE HYPERCHOLESTEROLEMIA: Status: ACTIVE | Noted: 2025-03-31

## 2025-03-31 PROBLEM — E11.42 DIABETIC PERIPHERAL NEUROPATHY (HCC): Status: ACTIVE | Noted: 2023-11-03

## 2025-03-31 PROBLEM — G47.33 OSA (OBSTRUCTIVE SLEEP APNEA): Status: ACTIVE | Noted: 2023-11-03

## 2025-03-31 PROBLEM — J30.9 ALLERGIC RHINITIS: Status: ACTIVE | Noted: 2023-11-03

## 2025-03-31 PROBLEM — E80.4 GILBERTS SYNDROME: Status: ACTIVE | Noted: 2023-11-03

## 2025-03-31 PROBLEM — Z86.711 HX PULMONARY EMBOLISM: Status: ACTIVE | Noted: 2023-11-03

## 2025-03-31 RX ORDER — METFORMIN HYDROCHLORIDE 500 MG/1
TABLET, EXTENDED RELEASE ORAL
Qty: 90 TABLET | Refills: 0 | Status: SHIPPED | OUTPATIENT
Start: 2025-03-31

## 2025-04-14 ENCOUNTER — LAB (OUTPATIENT)
Age: 80
End: 2025-04-14

## 2025-04-14 DIAGNOSIS — M81.0 SENILE OSTEOPOROSIS: ICD-10-CM

## 2025-04-14 DIAGNOSIS — E66.01 MORBID (SEVERE) OBESITY DUE TO EXCESS CALORIES: ICD-10-CM

## 2025-04-14 DIAGNOSIS — I10 ESSENTIAL (PRIMARY) HYPERTENSION: ICD-10-CM

## 2025-04-14 DIAGNOSIS — E11.65 TYPE 2 DIABETES MELLITUS WITH HYPERGLYCEMIA, WITHOUT LONG-TERM CURRENT USE OF INSULIN (HCC): ICD-10-CM

## 2025-04-14 DIAGNOSIS — E78.2 MIXED HYPERLIPIDEMIA: ICD-10-CM

## 2025-04-15 LAB
ALBUMIN SERPL-MCNC: 3.6 G/DL (ref 3.5–5)
ALBUMIN/GLOB SERPL: 1.2 (ref 1.1–2.2)
ALP SERPL-CCNC: 64 U/L (ref 45–117)
ALT SERPL-CCNC: 18 U/L (ref 12–78)
ANION GAP SERPL CALC-SCNC: 5 MMOL/L (ref 2–12)
AST SERPL-CCNC: 15 U/L (ref 15–37)
BILIRUB SERPL-MCNC: 0.9 MG/DL (ref 0.2–1)
BUN SERPL-MCNC: 15 MG/DL (ref 6–20)
BUN/CREAT SERPL: 22 (ref 12–20)
CALCIUM SERPL-MCNC: 9.5 MG/DL (ref 8.5–10.1)
CHLORIDE SERPL-SCNC: 107 MMOL/L (ref 97–108)
CHOLEST SERPL-MCNC: 133 MG/DL
CO2 SERPL-SCNC: 27 MMOL/L (ref 21–32)
CREAT SERPL-MCNC: 0.68 MG/DL (ref 0.55–1.02)
EST. AVERAGE GLUCOSE BLD GHB EST-MCNC: 163 MG/DL
GLOBULIN SER CALC-MCNC: 3 G/DL (ref 2–4)
GLUCOSE SERPL-MCNC: 190 MG/DL (ref 65–100)
HBA1C MFR BLD: 7.3 % (ref 4–5.6)
HDLC SERPL-MCNC: 43 MG/DL
HDLC SERPL: 3.1 (ref 0–5)
LDLC SERPL CALC-MCNC: 37.6 MG/DL (ref 0–100)
POTASSIUM SERPL-SCNC: 4.4 MMOL/L (ref 3.5–5.1)
PROT SERPL-MCNC: 6.6 G/DL (ref 6.4–8.2)
SODIUM SERPL-SCNC: 139 MMOL/L (ref 136–145)
TRIGL SERPL-MCNC: 262 MG/DL
VLDLC SERPL CALC-MCNC: 52.4 MG/DL

## 2025-04-21 ENCOUNTER — OFFICE VISIT (OUTPATIENT)
Age: 80
End: 2025-04-21
Payer: MEDICARE

## 2025-04-21 VITALS
OXYGEN SATURATION: 96 % | HEART RATE: 71 BPM | DIASTOLIC BLOOD PRESSURE: 62 MMHG | BODY MASS INDEX: 34.84 KG/M2 | TEMPERATURE: 97.4 F | SYSTOLIC BLOOD PRESSURE: 124 MMHG | HEIGHT: 66 IN | WEIGHT: 216.8 LBS

## 2025-04-21 DIAGNOSIS — M81.0 SENILE OSTEOPOROSIS: ICD-10-CM

## 2025-04-21 DIAGNOSIS — E11.65 TYPE 2 DIABETES MELLITUS WITH HYPERGLYCEMIA, WITHOUT LONG-TERM CURRENT USE OF INSULIN (HCC): Primary | ICD-10-CM

## 2025-04-21 DIAGNOSIS — I10 ESSENTIAL (PRIMARY) HYPERTENSION: ICD-10-CM

## 2025-04-21 DIAGNOSIS — E78.2 MIXED HYPERLIPIDEMIA: ICD-10-CM

## 2025-04-21 DIAGNOSIS — E66.01 MORBID (SEVERE) OBESITY DUE TO EXCESS CALORIES (HCC): ICD-10-CM

## 2025-04-21 PROCEDURE — G8427 DOCREV CUR MEDS BY ELIG CLIN: HCPCS | Performed by: INTERNAL MEDICINE

## 2025-04-21 PROCEDURE — 1123F ACP DISCUSS/DSCN MKR DOCD: CPT | Performed by: INTERNAL MEDICINE

## 2025-04-21 PROCEDURE — 1090F PRES/ABSN URINE INCON ASSESS: CPT | Performed by: INTERNAL MEDICINE

## 2025-04-21 PROCEDURE — 1036F TOBACCO NON-USER: CPT | Performed by: INTERNAL MEDICINE

## 2025-04-21 PROCEDURE — G8399 PT W/DXA RESULTS DOCUMENT: HCPCS | Performed by: INTERNAL MEDICINE

## 2025-04-21 PROCEDURE — 3078F DIAST BP <80 MM HG: CPT | Performed by: INTERNAL MEDICINE

## 2025-04-21 PROCEDURE — 99214 OFFICE O/P EST MOD 30 MIN: CPT | Performed by: INTERNAL MEDICINE

## 2025-04-21 PROCEDURE — 3074F SYST BP LT 130 MM HG: CPT | Performed by: INTERNAL MEDICINE

## 2025-04-21 PROCEDURE — G8417 CALC BMI ABV UP PARAM F/U: HCPCS | Performed by: INTERNAL MEDICINE

## 2025-04-21 PROCEDURE — 1125F AMNT PAIN NOTED PAIN PRSNT: CPT | Performed by: INTERNAL MEDICINE

## 2025-04-21 PROCEDURE — 1159F MED LIST DOCD IN RCRD: CPT | Performed by: INTERNAL MEDICINE

## 2025-04-21 PROCEDURE — 1160F RVW MEDS BY RX/DR IN RCRD: CPT | Performed by: INTERNAL MEDICINE

## 2025-04-21 PROCEDURE — 3051F HG A1C>EQUAL 7.0%<8.0%: CPT | Performed by: INTERNAL MEDICINE

## 2025-04-21 RX ORDER — ASPIRIN 81 MG/1
81 TABLET ORAL DAILY
COMMUNITY

## 2025-04-21 NOTE — PATIENT INSTRUCTIONS
SPECIFIC INSTRUCTIONS BELOW      trulicity   0.75 mg a week     Metformin  Ext rel    ONE   pill twice a day with meals       FORTEO 20 mcg  daily at night ( retry )         -------------PAY ATTENTION TO THESE GENERAL INSTRUCTIONS -----------------      - The medications prescribed at this visit will not be available at pharmacy until 6 pm       - YOUR MED LIST IS NOT UP TO DATE AS SOME CHANGES ARE BEING MADE AFTER THE VISIT - FOLLOW SPECIFIC INSTRUCTIONS  ABOVE     -ANY tests other than blood work, which you opt to do  outside the  [facility] facilities, you are responsible for prior authorizations if  required    - HEALTH MAINTENANCE IS NOT GOING TO BE UP TO DATE ON YOUR AVS- PLEASE IGNORE     Results     *Normal results will not be notified by a phone call starting January 1 2021   *If you have an upcoming visit, the results will be discussed at the visit   *Please sign up for MY CHART if you want access to your lab and test results  *Abnormal results which require immediate attention will be notified by phone call   *Abnormal results which do not require immediate assistance will be notified in 1-2 weeks       Refills    -    have your pharmacy send us a refill request . Refills are done max for one year and a visit is a must before refills are extended    Follow up appointments -  highly encourage you to make it when you are checking out. We can accommodate you into the schedule based on your clinical situation, but not for extending refills beyond a year. Labs are important to give refills and is important to get labs before the visit     Phone calls  -  Allow  24 hrs. for non-urgent calls to be returned  Prior authorization - It may take 2-4 weeks to process  Forms  -  FMLA, DMV etc., will take up to 2 weeks to process  Cancellations - please notify the office 2 days in advance   Samples  - will only be dispensed at visits       If not showing for the appointments and cancelling appointments within

## 2025-04-21 NOTE — PROGRESS NOTES
Mariela Coulter is a 79 y.o. female here for   Chief Complaint   Patient presents with    Diabetes    Osteoporosis       1. Have you been to the ER, urgent care clinic since your last visit?  Hospitalized since your last visit? - no      2. Have you seen or consulted any other health care providers outside of the [facility] since your last visit?  Include any pap smears or colon screening.- [facility] outpatient sx- biopsy for cancer,  PCP

## 2025-04-21 NOTE — PROGRESS NOTES
[facility] DIABETES AND ENDOCRINOLOGY              Bouchra Castro MD FACE     HISTORY OF PRESENT ILLNESS  Mariela Coulter is a 79  y.o. female.  HPI  Patient here for  6 month   F/u after last  visit of Type 2 diabetes mellitus  And osteoporosis  From December 2024   H/o pulmonary emboli ,  ankylosign spondilytis,    sciatica,  DJD ,         Fells tired, insomnia         Dec 2024      She tried OZEMPIC   in the interim,  she could not get Trulicity   She could not tolerate it because it made her sad and depressed - back to trulicity  now  Weight is stable         June 2024      S/p knee replacement surgery  -  march 2024    Healed well   LOST 14 lbs         Nov 2023   Patient was hesitant to go for  left knee replacement   Gained 18 lbs       May 2022   Lost 20 lbs since start of trulicity   She is planning for knee surgery    Pt says she was found to have low magnesium and was given OTC magnesium which bother her stomach she says   She had colonoscopy the day before   She had CT scan without contrast   from feb 2022 at [facility] for abdominal pain and tenderness , 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   She never liked this medicaiton  Current monitoring regimen: home blood tests - daily         Review of Systems   Constitutional: Negative.    Psychiatric/Behavioral: Negative for depression and memory loss. The patient does not have insomnia.        Physical Exam   Constitutional: She is oriented to person, place, and time. She appears well-developed and well-nourished.   Psychiatric: She has a normal mood and affect.       Labs      Lab Results   Component Value Date    LABA1C 7.3 (H) 04/14/2025    LABA1C 6.4 (H) 05/31/2024    LABA1C 6.6 (H) 03/04/2024       Lab

## 2025-04-24 NOTE — TELEPHONE ENCOUNTER
Prior auth is needed for generic brand of forteo, [facility] does not carry brand name. Please address. Thank you

## 2025-06-02 DIAGNOSIS — E11.65 TYPE 2 DIABETES MELLITUS WITH HYPERGLYCEMIA, WITHOUT LONG-TERM CURRENT USE OF INSULIN (HCC): ICD-10-CM

## 2025-06-02 RX ORDER — METFORMIN HYDROCHLORIDE 500 MG/1
TABLET, EXTENDED RELEASE ORAL
Qty: 180 TABLET | Refills: 3 | Status: SHIPPED | OUTPATIENT
Start: 2025-06-02

## 2025-08-28 DIAGNOSIS — E11.65 TYPE 2 DIABETES MELLITUS WITH HYPERGLYCEMIA, WITHOUT LONG-TERM CURRENT USE OF INSULIN (HCC): Primary | ICD-10-CM

## 2025-08-28 RX ORDER — DULAGLUTIDE 0.75 MG/.5ML
0.75 INJECTION, SOLUTION SUBCUTANEOUS WEEKLY
Qty: 6 ML | Refills: 3 | Status: ACTIVE | OUTPATIENT
Start: 2025-08-28

## 2025-08-29 ENCOUNTER — TELEPHONE (OUTPATIENT)
Age: 80
End: 2025-08-29

## (undated) DEVICE — SUTURE STRATAFIX SYMMETRIC SZ 1 L18IN ABSRB VLT CT1 L36CM SXPP1A404

## (undated) DEVICE — BLADE SURG SAW STD S STL OSC W/ SERR EDGE DISP

## (undated) DEVICE — SOLUTION IRRIG 1000ML STRL H2O USP PLAS POUR BTL

## (undated) DEVICE — PENCIL SMK EVAC ALL IN 1 DSGN ENH VISIBILITY IMPROVED AIR

## (undated) DEVICE — SUTURE STRATAFIX SPRL MCRYL + 3 0 SGL ARMED PS 1 24 IN LEN SXMP1B103

## (undated) DEVICE — RUBBERBAND FASTENING W0.25XL3.5IN 5 PER PK

## (undated) DEVICE — GLOVE SURG SZ 85 L12IN FNGR THK79MIL GRN LTX FREE

## (undated) DEVICE — GLOVE SURG SZ 9 L12IN FNGR THK79MIL GRN LTX FREE

## (undated) DEVICE — SUTURE VCRL SZ 2-0 L36IN ABSRB UD L36MM CT-1 1/2 CIR J945H

## (undated) DEVICE — TOTAL JOINT-SFMC: Brand: MEDLINE INDUSTRIES, INC.

## (undated) DEVICE — SOLUTION IRRIG 3000ML 0.9% SOD CHL USP UROMATIC PLAS CONT

## (undated) DEVICE — SOLUTION IV 1000ML 0.9% SOD CHL PH 5 INJ USP VIAFLX PLAS

## (undated) DEVICE — DRESSING ANTIMIC FOAM OPTIFOAM POSTOP ADH 4X14 IN

## (undated) DEVICE — GLOVE SURG SZ 85 L12IN FNGR ORTHO 126MIL CRM LTX FREE

## (undated) DEVICE — KIT TRK KNEE PROC VIZADISC

## (undated) DEVICE — DUAL IRRIGATION ADAPTOR

## (undated) DEVICE — SPONGE GZ W4XL4IN COT 12 PLY TYP VII WVN C FLD DSGN STERILE

## (undated) DEVICE — HOOD: Brand: FLYTE

## (undated) DEVICE — SUTURE VCRL + SZ 1-0 L36IN ABSRB UD CTX 1/2 CIR TAPR PNT VCP977H

## (undated) DEVICE — ELECTRODE BLDE L4IN NONINSULATED EDGE

## (undated) DEVICE — DRAPE,EXTREMITY,89X128,STERILE: Brand: MEDLINE

## (undated) DEVICE — BLUNTFILL: Brand: MONOJECT

## (undated) DEVICE — KIT DRP FOR RIO ROBOTIC ARM ASST SYS

## (undated) DEVICE — SYRINGE MED 30ML STD CLR PLAS LUERLOCK TIP N CTRL DISP

## (undated) DEVICE — COVER,MAYO STAND,STERILE: Brand: MEDLINE

## (undated) DEVICE — PIN BONE 3.2 X 140MM

## (undated) DEVICE — 4-PORT MANIFOLD: Brand: NEPTUNE 2

## (undated) DEVICE — 1010 S-DRAPE TOWEL DRAPE 10/BX: Brand: STERI-DRAPE™

## (undated) DEVICE — Device: Brand: JELCO

## (undated) DEVICE — PREP KIT PEEL PTCH POVIDONE IOD

## (undated) DEVICE — STRYKER PERFORMANCE SERIES SAGITTAL BLADE: Brand: STRYKER PERFORMANCE SERIES

## (undated) DEVICE — TAPE,CLOTH/SILK,CURAD,3"X10YD,LF,40/CS: Brand: CURAD